# Patient Record
Sex: FEMALE | Race: WHITE | NOT HISPANIC OR LATINO | Employment: FULL TIME | URBAN - METROPOLITAN AREA
[De-identification: names, ages, dates, MRNs, and addresses within clinical notes are randomized per-mention and may not be internally consistent; named-entity substitution may affect disease eponyms.]

---

## 2017-01-20 ENCOUNTER — TELEPHONE (OUTPATIENT)
Dept: FAMILY MEDICINE | Facility: CLINIC | Age: 57
End: 2017-01-20

## 2017-01-20 NOTE — TELEPHONE ENCOUNTER
1/20/2017    Call Regarding Preventive Health Screening Cervical/PAP    Attempt 1    Message on voicemail     Comments:         Outreach   Bianca Wood

## 2017-02-16 DIAGNOSIS — M05.79 RHEUMATOID ARTHRITIS INVOLVING MULTIPLE SITES WITH POSITIVE RHEUMATOID FACTOR (H): ICD-10-CM

## 2017-02-16 DIAGNOSIS — Z79.899 ENCOUNTER FOR LONG-TERM (CURRENT) USE OF HIGH-RISK MEDICATION: ICD-10-CM

## 2017-02-16 DIAGNOSIS — M25.522 ELBOW PAIN, LEFT: ICD-10-CM

## 2017-02-16 NOTE — TELEPHONE ENCOUNTER
humira    Last Written Prescription Date:  12/19/16  Last Fill Quantity: 1.6 ml,   # refills: 5  Last Office Visit : 12/19/16  Future Office visit:  4/3/17

## 2017-02-27 DIAGNOSIS — E03.4 HYPOTHYROIDISM DUE TO ACQUIRED ATROPHY OF THYROID: ICD-10-CM

## 2017-02-27 RX ORDER — LEVOTHYROXINE SODIUM 112 UG/1
112 TABLET ORAL DAILY
Qty: 30 TABLET | Refills: 0 | Status: SHIPPED | OUTPATIENT
Start: 2017-02-27 | End: 2017-06-07

## 2017-02-27 NOTE — TELEPHONE ENCOUNTER
Medication is being filled for 1 time refill only due to:  Patient needs to be seen because it has been more than one year since last visit.   Carmen Piña RN  [facility]

## 2017-02-27 NOTE — TELEPHONE ENCOUNTER
levothyroxine (SYNTHROID, LEVOTHROID) 112 MCG tablet     Last Written Prescription Date: 11-30-16  Last Quantity: 90, # refills: 0  Last Office Visit with G, P or [facility] prescribing provider: 1-5-16        TSH   Date Value Ref Range Status   12/12/2016 1.31 0.40 - 4.00 mU/L Final

## 2017-03-21 DIAGNOSIS — Z51.81 ENCOUNTER FOR THERAPEUTIC DRUG MONITORING: ICD-10-CM

## 2017-03-21 LAB
ALT SERPL W P-5'-P-CCNC: 25 U/L (ref 0–50)
AST SERPL W P-5'-P-CCNC: 21 U/L (ref 0–45)
BASOPHILS # BLD AUTO: 0.1 10E9/L (ref 0–0.2)
BASOPHILS NFR BLD AUTO: 1 %
CREAT SERPL-MCNC: 0.71 MG/DL (ref 0.52–1.04)
CRP SERPL-MCNC: <2.9 MG/L (ref 0–8)
DIFFERENTIAL METHOD BLD: ABNORMAL
EOSINOPHIL # BLD AUTO: 0.2 10E9/L (ref 0–0.7)
EOSINOPHIL NFR BLD AUTO: 3.7 %
ERYTHROCYTE [DISTWIDTH] IN BLOOD BY AUTOMATED COUNT: 13.1 % (ref 10–15)
ERYTHROCYTE [SEDIMENTATION RATE] IN BLOOD BY WESTERGREN METHOD: 6 MM/H (ref 0–30)
GFR SERPL CREATININE-BSD FRML MDRD: 85 ML/MIN/1.7M2
HCT VFR BLD AUTO: 37.8 % (ref 35–47)
HGB BLD-MCNC: 13 G/DL (ref 11.7–15.7)
LYMPHOCYTES # BLD AUTO: 1.8 10E9/L (ref 0.8–5.3)
LYMPHOCYTES NFR BLD AUTO: 34.4 %
MCH RBC QN AUTO: 33.2 PG (ref 26.5–33)
MCHC RBC AUTO-ENTMCNC: 34.4 G/DL (ref 31.5–36.5)
MCV RBC AUTO: 96 FL (ref 78–100)
MONOCYTES # BLD AUTO: 0.6 10E9/L (ref 0–1.3)
MONOCYTES NFR BLD AUTO: 11.1 %
NEUTROPHILS # BLD AUTO: 2.6 10E9/L (ref 1.6–8.3)
NEUTROPHILS NFR BLD AUTO: 49.8 %
PLATELET # BLD AUTO: 233 10E9/L (ref 150–450)
RBC # BLD AUTO: 3.92 10E12/L (ref 3.8–5.2)
WBC # BLD AUTO: 5.1 10E9/L (ref 4–11)

## 2017-03-21 PROCEDURE — 82565 ASSAY OF CREATININE: CPT | Performed by: FAMILY MEDICINE

## 2017-03-21 PROCEDURE — 86140 C-REACTIVE PROTEIN: CPT | Performed by: FAMILY MEDICINE

## 2017-03-21 PROCEDURE — 36415 COLL VENOUS BLD VENIPUNCTURE: CPT | Performed by: FAMILY MEDICINE

## 2017-03-21 PROCEDURE — 85652 RBC SED RATE AUTOMATED: CPT | Performed by: FAMILY MEDICINE

## 2017-03-21 PROCEDURE — 85025 COMPLETE CBC W/AUTO DIFF WBC: CPT | Performed by: FAMILY MEDICINE

## 2017-03-21 PROCEDURE — 84450 TRANSFERASE (AST) (SGOT): CPT | Performed by: FAMILY MEDICINE

## 2017-03-21 PROCEDURE — 84460 ALANINE AMINO (ALT) (SGPT): CPT | Performed by: FAMILY MEDICINE

## 2017-03-22 ENCOUNTER — TELEPHONE (OUTPATIENT)
Dept: RHEUMATOLOGY | Facility: CLINIC | Age: 57
End: 2017-03-22

## 2017-03-22 NOTE — TELEPHONE ENCOUNTER
[facility] informing patient that a [employer] message has been sent regarding her recent lab results. Provided phone number for clinic should she have questions. Sent [employer] message with information from Dr. Guajardo.

## 2017-03-22 NOTE — TELEPHONE ENCOUNTER
----- Message from Ann Marie Guajardo MD sent at 3/22/2017  7:59 AM CDT -----  Regarding: labs  Can someone kindly let Ms. Mora know the following:  - her methotrexate monitoring labs are all normal, including blood counts, kidney function, and liver tests  - markers of inflammation are normal  - OK to continue current plan    Thanks  Cate

## 2017-04-03 ENCOUNTER — OFFICE VISIT (OUTPATIENT)
Dept: RHEUMATOLOGY | Facility: CLINIC | Age: 57
End: 2017-04-03
Attending: INTERNAL MEDICINE
Payer: COMMERCIAL

## 2017-04-03 VITALS
BODY MASS INDEX: 24.57 KG/M2 | DIASTOLIC BLOOD PRESSURE: 80 MMHG | SYSTOLIC BLOOD PRESSURE: 127 MMHG | HEART RATE: 67 BPM | OXYGEN SATURATION: 98 % | WEIGHT: 142 LBS

## 2017-04-03 DIAGNOSIS — Z79.899 ENCOUNTER FOR LONG-TERM (CURRENT) USE OF HIGH-RISK MEDICATION: ICD-10-CM

## 2017-04-03 DIAGNOSIS — D84.9 IMMUNOCOMPROMISED (H): Primary | ICD-10-CM

## 2017-04-03 DIAGNOSIS — M05.79 RHEUMATOID ARTHRITIS INVOLVING MULTIPLE SITES WITH POSITIVE RHEUMATOID FACTOR (H): ICD-10-CM

## 2017-04-03 DIAGNOSIS — M25.522 ELBOW PAIN, LEFT: ICD-10-CM

## 2017-04-03 PROCEDURE — G0009 ADMIN PNEUMOCOCCAL VACCINE: HCPCS | Mod: ZF

## 2017-04-03 PROCEDURE — 99212 OFFICE O/P EST SF 10 MIN: CPT | Mod: 25,ZF

## 2017-04-03 PROCEDURE — 90732 PPSV23 VACC 2 YRS+ SUBQ/IM: CPT | Mod: ZF | Performed by: INTERNAL MEDICINE

## 2017-04-03 PROCEDURE — 25000128 H RX IP 250 OP 636: Mod: ZF | Performed by: INTERNAL MEDICINE

## 2017-04-03 RX ORDER — FOLIC ACID 1 MG/1
TABLET ORAL
Qty: 45 TABLET | Refills: 6 | Status: SHIPPED | OUTPATIENT
Start: 2017-04-03 | End: 2017-11-11

## 2017-04-03 RX ADMIN — PNEUMOCOCCAL VACCINE POLYVALENT 0.5 ML
25; 25; 25; 25; 25; 25; 25; 25; 25; 25; 25; 25; 25; 25; 25; 25; 25; 25; 25; 25; 25; 25; 25 INJECTION, SOLUTION INTRAMUSCULAR; SUBCUTANEOUS at 12:29

## 2017-04-03 ASSESSMENT — PAIN SCALES - GENERAL: PAINLEVEL: NO PAIN (0)

## 2017-04-03 NOTE — NURSING NOTE
Patient who was identified by name and  was given Pneumovax 23. Patient tolerated injection well and was discharged without complication.   Manuela Bronson  CMA

## 2017-04-03 NOTE — PROGRESS NOTES
Rheumatology Clinic Visit-Fellow Note     Carolina Mora MRN# [id number]   YOB: 1960 Age: 55 year old     Date of Visit :Apr 3, 2017     Primary care provider: Michael Beltrán (General)          Assessment and Plan:   Assessment   # seropositive RA, non-erosive (RF 49/ACPA 205), dx 7/2015. Reportedly prior rheumatoid nodules (BL elbow, R hand, R foot - resolved), known L elbow flexion contracture.   # UC, in remission - dx >10y ago during her 2nd pregnancy, presented w/ hematochezia. Colonoscopy 2/2016 normal, repeat due 2/2018  # hx of hashimoto's thyroiditis  # fam hx of AI diseases - psoriasis, celiac, Crohn's   # prior smoker - 3-4 cigs per day, quit 2 wks ago  # hx of BL plantar fasciitis - resolved  # hx of L hand 3rd digit fracture    RA in remission x1 year. Discussed options of slowly de-escalating therapy to see how she does given she's been in remission for >6 months. We agreed to slowly decrease MTX and see how she tolerates this. Discussed given the aggressive nature of her disease at time of diagnosis (highly seropositive, elbow flexion contracture, rheumatoid nodules initially), I suspect that she will unlikely be able to be completely off all medications in the future. However, again, I do feel we can slowly titrate down on MTX. Would like to keep MTX on board for now (even if low dose) to theoretically allow Humira to work more efficaciously and longer. Pt in agreement of the above plan.     DEXA today shows normal bone density. Discussed ways to keep up bone health including increasing vitamin D intake through dietary means and increasing joint-bearing activities such as walking, etc.     Plan:  - continue Humira 40 mg SQ q2 weeks  - decrease MTX from 8 to 6 tabs weekly, continue FA daily (takes additional tab day before/day of/day after MTX)  - continue q3mo MTX monitoring labs  - pneumococcal vaccine today   - discussed restarting routine exercise regimen (walking) and trying to  incorporate vitamin D and omega-3 FA into her diet   - check Vit D at her next monitoring labs   - continue cigarette free! Excellent job    Orders Placed This Encounter   Procedures     ALT     AST     Creatinine     CBC with platelets differential     Vitamin D Deficiency     CRP inflammation     ESR     RTC in 3 mo    RHM  HBV: negative 7/2015  HCV: negative 7/2015  Quant gold: negative 12/2015  HIV: never tested per chart review  Cancer screenings: colonoscopy 2/2016 normal, mammo 6/2016 normal, due for PAP (last 7/2013)    Immunization History   Administered Date(s) Administered     Influenza (H1N1) 12/03/2009     Influenza (IIV3) 11/13/2008, 11/23/2009, 11/15/2010, 11/03/2011, 10/17/2012     Pneumococcal (PCV 13) 12/19/2016     Pneumococcal 23 valent 04/03/2017     TD (ADULT, 7+) 02/19/2002     TDAP Vaccine (Adacel) 06/14/2011   Flu UTD 2016    Pt seen and discussed with Dr. Fanta Guajardo MD  Rheumatology Fellow   Pager [phone]          Active Problem List:     Patient Active Problem List    Diagnosis Date Noted     Hypothyroidism due to acquired atrophy of thyroid 06/01/2016     Priority: Medium     Rheumatoid arthritis involving multiple sites with positive rheumatoid factor (H) 02/29/2016     Priority: Medium     Encounter for long-term (current) use of high-risk medication 02/29/2016     Priority: Medium     Hyperlipidemia with target LDL less than 130 07/31/2014     Diagnosis updated by automated process. Provider to review and confirm.       Ventral hernia without obstruction or gangrene 07/31/2014     History of nephrolithiasis 01/04/2013     Ulcerative colitis (H)      Smoker             History of Present Illness:   HPI from initial 7/2015 OV:  Carolina Mora is a 55 year old female with past medical history of colitis presented to clinic for evaluation of joint pains. Joint pains started last year in winter with carpal tunnel like symptoms. She saw her primary care in March  2015 and started using a splint which did help in the beginning but later in May her symptoms got worse. She also had swelling over her hands and feet and had difficulty making a tight fist. She had morning stiffness which lasts more than 1 hour. Starting having pain in her neck and reduced neck range of motion and had been taking ibuprofen  800 mg every 6 hours, which helped with some of the symptoms but did not make her 100% better . She had pain in her knees and lateral aspect of hips mostly at night.     She has history of colitis which was diagnosed in 2006 by colonoscopy. She was initially given a diagnosis of ulcerative colitis and started using suppository which helped with her symptoms. In 2014 she had a repeat colonoscopy after 8 years which showed no evidence of IBD on biopsy but showed ileitis and there was concern for Crohn's disease. She is not on any treatment and her bowel symptoms are controlled, she'll be getting a repeat colonoscopyhere was c in March 2016.    She has significant family history of autoimmune disease. Her nephew has Crohn's disease, father has celiac disease and 2 of her sisters have psoriasis. She quit smoking in May 2015 and has smoked 5 cigarettes per day for the past 20 years.    Interim hx 9/4/15 - Her blood tests revealed high RF 45 and ACPA of 205 conforming the diagnosis of rheumatoid arthritis, she was started on  Methotrexate starting from 7.5 mg and increased to 15 mg, 6 tab in 2 weeks . She has been on 15 mg methotrexate now for 4 weeks along with tapering dose of prednisone. Currently taking 5 mg prednisone. She felt 95 % better. Her joint pains, morning stiffness, joint swelling have improved. She has more energy, able to do gardening and use her hands without pain . Denies any side effects with Methotrexate use. She does c/o left elbow swelling and decreased ROM which becomes more pronounced towards the end of the week when she is due for her Methotrexate dose. She  "takes MTX on Saturday and following 2- 3 days her elbow is better with near normal ROM but then it becomes stiff. She also c/o pain in the plantar aspect of her feet mainly in the morning.     Interim hx - 12/11/15- She has some aches and pain in her hands. Her morning stiffness has improved and has decreased to 15 mins. Her feet are better, she uses new shoes for her plantar fascitis. She still has left elbow flexion contracture and can not straighten it out, even with higher dose of MTX it has not improved. She has some achy pain in it and her ROM is reduced. She has no side effects with higher dose of MTX. No oral ulcers, GI upset, hair loss or headaches.     Interim hx - 2/29/16 -  She was started on Humira on 1/2/16 because of persistent pain in few of her joints including hands and feet along with elbow flexion contracture. Over all her joint pains improved a lot and she has no morning stiffness, but the elbow contracture has not improved. She has mild pain in her elbow but is still able to use that arm. She is also on MTX 8 tab + folic acid. She had colonoscopy this month which showed no evidence of colitis. She has no GI symptoms    Interim hx - 5/27/16 - She is on Humira 40 mg SQ/ 2 weeks + MTX 8 tab + folic acid. She is doing well. Denies any joint pains or morning stiffness. She has had no recent flares. She started doing physical therapy for left elbow contracture and has noticed good results. ROM of her elbow has increased and also strength in that arm has improved.     Interim hx - 8/29/16: She's overall doing quite well on humira 40 mg q2 wks and methotrexate 8 tabs weekly. Denies any joint swelling/stiffness and denies any EMS. Still has 10 deg flexion contracture of L elbow but feels this continues to improve with PT. No recent colds/infections while on immunosuppressives and rarely has an injection site reaction. On \"double weekends\" where takes both humira and MTX on a Saturday, she notes the " "following Sunday she will feel fatigued with some stomach upset. Takes FA 1 mg daily. No active bowel concerns, not needing suppositories for her IBD. Last colonoscopy 2/2016 normal, repeat in 2 years.     Interim hx 12/19/2016:  Overall continues to do quite well. Has \"very occasional bouts\" of joint symptoms. For example one Sunday morning a month or so ago she helped catch at her daughter's softball practice. States she \"took several balls to the ankle\" and noted she woke up the next day with painful/stiff BL ankles which lasted several weeks. Denied swelling/warmth to ankles. Otherwise no joint issues, no EMS. Some GI upset on days she takes both Humira and MTX, but tried FA increased dosing around this time and sx's improved. Will have loose stools for a few days following her Humira.     Interim hx 4/3/2017:  Pt continues to do well. Denies any arthritis pain, swelling, or warmth and denies any EMS. Denies back or neck pain. No recent flares. Tolerating Humira and MTX well. No injection site reactions. Stopped smoking 2 wks ago. Stopped walking about a year ago for exercise, but is interested in restarting this. Denies f/s/c, cp, SOB, abd pain, n/t, rash. Interested in learning about options for de-escalating therapy if she continues to be in remission.            Review of Systems:   Review Of Systems  Constitutional: denies f/s/c  Skin: No skin rash.  Eyes: No vision change, dry eyes  Ears/Nose/Throat: no oral/nasal ulcers  Respiratory: No SOB, cough  Cardiovascular: no cp  Gastrointestinal: no change in BM  Neurologic: no numbness, tingling.   Endocrine: +hx Hashimotos             Past Medical History:     Past Medical History:   Diagnosis Date     Dysplasia of cervix 1990's    had cryotx     Hyperlipidemia LDL goal < 130 7/31/2014     Hypothyroidism      Menopause age 48     Nephrolithiasis 1/11    s/p ESWL     Rheumatoid arthritis (H) 6/15     Smoker      Ulcerative colitis (H) 1-06     Past Surgical " History:   Procedure Laterality Date     C REMOVAL GALLBLADDER  2-01    and repair ventral hernia     COLONOSCOPY  3-14-14     CRYOTHERAPY, CERVICAL  EARLY 1990's            Social History:     Social History     Occupational History      [employer]     Social History Main Topics     Smoking status: Former Smoker     Packs/day: 0.25     Types: Cigarettes     Quit date: 5/22/2015     Smokeless tobacco: Never Used      Comment: E Cig      Alcohol use Yes      Comment: occasional wine     Drug use: No     Sexual activity: Not Currently     Partners: Male   smoked for 30 years - quit 2 wks ago  EtOH use - none  Works in office setting          Family History:     Family History   Problem Relation Age of Onset     Breast Cancer Mother      Thyroid Disease Mother      DIABETES Maternal Grandmother      Hypertension Maternal Grandmother      CEREBROVASCULAR DISEASE Maternal Grandmother      CANCER Maternal Grandmother      Hypertension Paternal Grandmother      Breast Cancer Paternal Grandmother      CANCER Paternal Grandmother      Thyroid Disease Sister      Asthma Son             Allergies:     Allergies   Allergen Reactions     Codeine      Nausea and vomiting     Dye [Contrast Dye]             Medications:     Current Outpatient Prescriptions   Medication Sig Dispense Refill     methotrexate 2.5 MG tablet CHEMO Take 6 tablets (15 mg) by mouth once a week Take 6 tablets, 15 mg  of methotrexate once a week 24 tablet 3     folic acid (FOLVITE) 1 MG tablet Take 1 tab daily. Day prior to, day of, and day after MTX take 2 tabs. 45 tablet 6     adalimumab (HUMIRA PEN) 40 MG/0.8ML pen kit Inject 0.8 mLs (40 mg) Subcutaneous every 14 days 6 each 1     levothyroxine (SYNTHROID/LEVOTHROID) 112 MCG tablet Take 1 tablet (112 mcg) by mouth daily 2/27/17: Due for recheck/physical with MD. 30 tablet 0     [DISCONTINUED] methotrexate 2.5 MG tablet CHEMO Take 6 tablets (15 mg) by mouth once a week Take 6 tablets, 15 mg  of  methotrexate once a week 24 tablet 3     [DISCONTINUED] adalimumab (HUMIRA PEN) 40 MG/0.8ML pen kit Inject 0.8 mLs (40 mg) Subcutaneous every 14 days 6 each 1     [DISCONTINUED] folic acid (FOLVITE) 1 MG tablet Take 1 tab daily. Day prior to, day of, and day after MTX take 2 tabs. 45 tablet 6     [DISCONTINUED] methotrexate 2.5 MG tablet CHEMO Take 8 tablets, 20 mg  of methotrexate once a week 32 tablet 3     ibuprofen (ADVIL,MOTRIN) 600 MG tablet Take 600 mg by mouth as needed Reported on 4/3/2017              Physical Exam:   /80  Pulse 67  Wt 64.4 kg (142 lb)  SpO2 98%  BMI 24.57 kg/m2  142 lbs 0 oz     Constitutional: well-developed, appearing stated age; cooperative  Eyes: nl EOM, PERRLA, vision, conjunctiva, sclera  ENT: nl external ears, nose, hearing, lips, teeth, gums, throat  No mucous membrane lesions, normal saliva pool  Neck: no mass or thyroid enlargement  Resp: lungs clear to auscultation, nl to palpation  CV: RRR, no murmurs, rubs or gallops, no edema  GI: no ABD mass or tenderness, no HSM  Lymph: no cervical, supraclavicular, or epitrochlear nodes  MS: All TMJ, neck, shoulder, elbow, wrist, MCP/PIP/DIP, spine, hip, knee, ankle, and foot MTP/IP joints were examined.  - BL hands scattered very subtle swan neck deformities of DIPs  - mild joint laxity of BL wrists   - L elbow w/ 5-10 deg contraction flexure w/o active synovitis (unchanged)  - L hand 3rd PIP slight contraction flexure 2/2 prior fracture   Normal  strength. No dactylitis,  tenosynovitis, enthesopathy.  Skin: no nail pitting, alopecia, rash, lesions  Neuro: grossly nl cranial nerves, strength   Psych: nl judgement, orientation, memory, affect.         Data:     Results for orders placed or performed in visit on 03/21/17   CRP inflammation   Result Value Ref Range    CRP Inflammation <2.9 0.0 - 8.0 mg/L   ESR   Result Value Ref Range    Sed Rate 6 0 - 30 mm/h   CBC with platelets differential   Result Value Ref Range    WBC  5.1 4.0 - 11.0 10e9/L    RBC Count 3.92 3.8 - 5.2 10e12/L    Hemoglobin 13.0 11.7 - 15.7 g/dL    Hematocrit 37.8 35.0 - 47.0 %    MCV 96 78 - 100 fl    MCH 33.2 (H) 26.5 - 33.0 pg    MCHC 34.4 31.5 - 36.5 g/dL    RDW 13.1 10.0 - 15.0 %    Platelet Count 233 150 - 450 10e9/L    Diff Method Automated Method     % Neutrophils 49.8 %    % Lymphocytes 34.4 %    % Monocytes 11.1 %    % Eosinophils 3.7 %    % Basophils 1.0 %    Absolute Neutrophil 2.6 1.6 - 8.3 10e9/L    Absolute Lymphocytes 1.8 0.8 - 5.3 10e9/L    Absolute Monocytes 0.6 0.0 - 1.3 10e9/L    Absolute Eosinophils 0.2 0.0 - 0.7 10e9/L    Absolute Basophils 0.1 0.0 - 0.2 10e9/L   Creatinine   Result Value Ref Range    Creatinine 0.71 0.52 - 1.04 mg/dL    GFR Estimate 85 >60 mL/min/1.7m2    GFR Estimate If Black >90   GFR Calc   >60 mL/min/1.7m2   ALT   Result Value Ref Range    ALT 25 0 - 50 U/L   AST   Result Value Ref Range    AST 21 0 - 45 U/L     Cyclic Cit Pept IgG/IgA   Date Value Ref Range Status   07/06/2015 205 (H) <20 UNITS Final     Comment:     Strongly Positive     Hemoglobin   Date Value Ref Range Status   03/21/2017 13.0 11.7 - 15.7 g/dL Final   12/12/2016 13.9 11.7 - 15.7 g/dL Final   08/22/2016 14.0 11.7 - 15.7 g/dL Final     Urea Nitrogen   Date Value Ref Range Status   05/28/2015 18 7 - 30 mg/dL Final     Sed Rate   Date Value Ref Range Status   03/21/2017 6 0 - 30 mm/h Final   12/12/2016 6 0 - 30 mm/h Final   08/27/2015 8 0 - 30 mm/h Final     C-Reactive Protein   Date Value Ref Range Status   02/20/2015 <2.0 0 - 0.8 mg/dL Final     CRP Inflammation   Date Value Ref Range Status   03/21/2017 <2.9 0.0 - 8.0 mg/L Final   12/12/2016 <2.9 0.0 - 8.0 mg/L Final   05/19/2016 <2.9 0.0 - 8.0 mg/L Final     AST   Date Value Ref Range Status   03/21/2017 21 0 - 45 U/L Final   12/12/2016 19 0 - 45 U/L Final   08/22/2016 15 0 - 45 U/L Final     Albumin   Date Value Ref Range Status   08/22/2016 3.7 3.4 - 5.0 g/dL Final   05/19/2016 3.7  3.4 - 5.0 g/dL Final   05/28/2015 3.5 3.4 - 5.0 g/dL Final     Alkaline Phosphatase   Date Value Ref Range Status   05/28/2015 99 40 - 150 U/L Final     ALT   Date Value Ref Range Status   03/21/2017 25 0 - 50 U/L Final   12/12/2016 23 0 - 50 U/L Final   08/22/2016 20 0 - 50 U/L Final     Rheumatoid Factor   Date Value Ref Range Status   05/28/2015 49 (H) <20 IU/mL Final     Recent Labs   Lab Test  03/21/17   0753  12/12/16   0830  12/12/16   0829  08/22/16   0659   05/28/15   1631  07/31/14   0841   WBC  5.1   --   6.6  6.6   < >  7.2  5.5   HGB  13.0   --   13.9  14.0   < >  13.5  14.8   HCT  37.8   --   40.2  40.5   < >  39.8  43.0   MCV  96   --   95  95   < >  92  91   PLT  233   --   258  263   < >  282  215   BUN   --    --    --    --    --   18   --    TSH   --   1.31   --    --    --   4.09*  0.67   AST  21   --   19  15   < >  16   --    ALT  25   --   23  20   < >  20   --    ALKPHOS   --    --    --    --    --   99   --     < > = values in this interval not displayed.     BL hand xray 8/2016:  Impression:  1. No acute osseous abnormality.  2. No radiographic evidence of inflammatory arthritis.    Reviewed Rheumatology lab flow sheet        Attestation:  I was present with Dr. Guajardo during lozano aspects of history and physical examination, was directly involved in medical decision making and management of this patient, and I agree with the Dr. Guajardo s documentation, findings, and plan.     Now that Carolina's high-risk seropositive RA is well controlled, it's a good time to focus on health maintenance: management of cardiovascular risk, bone health, vaccinations, which we discussed in detail today.    Agree with trying a lower dose of MTX to decrease cumulative MTX exposure, but given erosive, CCP-positive disease, I don't think a drug holiday is feasible or beneficial.     I discussed the findings and recommendations with the patient.    Time spent: 40 minutes    Lana Jewell MD, MS  Rheumatology  Attending Physician  [facility] [phone]

## 2017-04-03 NOTE — MR AVS SNAPSHOT
After Visit Summary   4/3/2017    Carolina Mora    MRN: [id number]           Patient Information     Date Of Birth          1960        Visit Information        Provider Department      4/3/2017 10:30 AM Ann Marie Guajardo MD [facility]        Today's Diagnoses     Immunocompromised (H)    -  1    Rheumatoid arthritis involving multiple sites with positive rheumatoid factor (H)        Encounter for long-term (current) use of high-risk medication        Elbow pain, left          Care Instructions    DEXA bone density scan looked normal today     Labs from last week all normal    Decrease methotrexate from 8 tabs to 6 tabs weekly (prescription changed)  Continue folic acid daily     Continue Humira     Try to increase activity level - start walking again!  Can try to increase omega-3 fatty acids in diet - in fish like salmon, flaxseed or mery seeds, or as supplement     Return in 3 months  Standing labs are ordered - can have these done before next appt OR at your next appt (will also check vitamin D)    Pneumococcal vaccine today         Follow-ups after your visit        Follow-up notes from your care team     Return in about 3 months (around 7/3/2017).      Your next 10 appointments already scheduled     Jul 10, 2017  8:30 AM CDT   (Arrive by 8:15 AM)   Return Visit with Ann Marie Guajardo MD   [facility] ([facility])    [address] [place] [id number] [phone]              Future tests that were ordered for you today     Open Future Orders        Priority Expected Expires Ordered    Vitamin D Deficiency Routine 7/3/2017 4/3/2018 4/3/2017    CRP inflammation Routine 7/3/2017 4/3/2018 4/3/2017    ESR Routine 7/3/2017 4/3/2018 4/3/2017    ALT Routine 7/3/2017 4/3/2018 4/3/2017    AST Routine 7/3/2017 4/3/2018 4/3/2017    Creatinine Routine 7/3/2017 4/3/2018 4/3/2017    CBC with platelets differential Routine  7/3/2017 4/3/2018 4/3/2017            Who to contact     If you have questions or need follow up information about today's clinic visit or your schedule please contact [facility] directly at [phone].  Normal or non-critical lab and imaging results will be communicated to you by [employer], letter or phone within 4 business days after the clinic has received the results. If you do not hear from us within 7 days, please contact the clinic through [employer] or phone. If you have a critical or abnormal lab result, we will notify you by phone as soon as possible.  Submit refill requests through [employer] or call your pharmacy and they will forward the refill request to us. Please allow 3 business days for your refill to be completed.          Additional Information About Your Visit        [employer] Information     [employer] gives you secure access to your electronic health record. If you see a primary care provider, you can also send messages to your care team and make appointments. If you have questions, please call your primary care clinic.  If you do not have a primary care provider, please call [phone] and they will assist you.        Care EveryWhere ID     This is your Care EveryWhere ID. This could be used by other organizations to access your [facility] medical records  [id number]        Your Vitals Were     Pulse Pulse Oximetry BMI (Body Mass Index)             67 98% 24.57 kg/m2          Blood Pressure from Last 3 Encounters:   04/03/17 127/80   12/19/16 138/85   08/29/16 115/78    Weight from Last 3 Encounters:   04/03/17 64.4 kg (142 lb)   12/19/16 64.4 kg (142 lb)   08/29/16 61.7 kg (136 lb)                 Today's Medication Changes          These changes are accurate as of: 4/3/17 12:11 PM.  If you have any questions, ask your nurse or doctor.               Start taking these medicines.        Dose/Directions    methotrexate 2.5 MG tablet CHEMO   Used for:  Rheumatoid arthritis involving multiple  sites with positive rheumatoid factor (H), Encounter for long-term (current) use of high-risk medication, Elbow pain, left   Started by:  Ann Marie Guajardo MD        Dose:  15 mg   Take 6 tablets (15 mg) by mouth once a week Take 6 tablets, 15 mg  of methotrexate once a week   Quantity:  24 tablet   Refills:  3            Where to get your medicines      These medications were sent to [facility] [id number] - [place], [place] - [address]  [address] [address]     Phone:  [phone]     folic acid 1 MG tablet    methotrexate 2.5 MG tablet CHEMO         Call your pharmacy to confirm that your medication is ready for pickup. It may take up to 24 hours for them to receive the prescription. If the prescription is not ready within 3 business days, please contact your clinic or your provider.     We will let you know when these medications are ready. If you don't hear back within 3 business days, please contact us.     adalimumab 40 MG/0.8ML pen kit                Primary Care Provider Office Phone # Fax #    Michael Beltrán [phone]       [address] [address]        Thank you!     Thank you for choosing [facility]  for your care. Our goal is always to provide you with excellent care. Hearing back from our patients is one way we can continue to improve our services. Please take a few minutes to complete the written survey that you may receive in the mail after your visit with us. Thank you!             Your Updated Medication List - Protect others around you: Learn how to safely use, store and throw away your medicines at www.disposemymeds.org.          This list is accurate as of: 4/3/17 12:11 PM.  Always use your most recent med list.                   Brand Name Dispense Instructions for use    adalimumab 40 MG/0.8ML pen kit    HUMIRA PEN    6 each    Inject 0.8 mLs (40 mg) Subcutaneous every 14  days       folic acid 1 MG tablet    FOLVITE    45 tablet    Take 1 tab daily. Day prior to, day of, and day after MTX take 2 tabs.       ibuprofen 600 MG tablet    ADVIL/MOTRIN     Take 600 mg by mouth as needed Reported on 4/3/2017       levothyroxine 112 MCG tablet    SYNTHROID/LEVOTHROID    30 tablet    Take 1 tablet (112 mcg) by mouth daily 2/27/17: Due for recheck/physical with MD.       methotrexate 2.5 MG tablet CHEMO     24 tablet    Take 6 tablets (15 mg) by mouth once a week Take 6 tablets, 15 mg  of methotrexate once a week

## 2017-04-03 NOTE — NURSING NOTE
"Chief Complaint   Patient presents with     RECHECK     RA       Initial /80  Pulse 67  Wt 64.4 kg (142 lb)  SpO2 98%  BMI 24.57 kg/m2 Estimated body mass index is 24.57 kg/(m^2) as calculated from the following:    Height as of 12/19/16: 1.619 m (5' 3.75\").    Weight as of this encounter: 64.4 kg (142 lb).  Medication Reconciliation: complete    "

## 2017-04-03 NOTE — LETTER
4/3/2017      RE: Carolina Mora  [address]  [facility] [id number]       Rheumatology Clinic Visit-Fellow Note     Carolina Mora MRN# [id number]   YOB: 1960 Age: 55 year old     Date of Visit :Apr 3, 2017     Primary care provider: Michael Beltrán (General)          Assessment and Plan:   Assessment   # seropositive RA, non-erosive (RF 49/ACPA 205), dx 7/2015. Reportedly prior rheumatoid nodules (BL elbow, R hand, R foot - resolved), known L elbow flexion contracture.   # UC, in remission - dx >10y ago during her 2nd pregnancy, presented w/ hematochezia. Colonoscopy 2/2016 normal, repeat due 2/2018  # hx of hashimoto's thyroiditis  # fam hx of AI diseases - psoriasis, celiac, Crohn's   # prior smoker - 3-4 cigs per day, quit 2 wks ago  # hx of BL plantar fasciitis - resolved  # hx of L hand 3rd digit fracture    RA in remission x1 year. Discussed options of slowly de-escalating therapy to see how she does given she's been in remission for >6 months. We agreed to slowly decrease MTX and see how she tolerates this. Discussed given the aggressive nature of her disease at time of diagnosis (highly seropositive, elbow flexion contracture, rheumatoid nodules initially), I suspect that she will unlikely be able to be completely off all medications in the future. However, again, I do feel we can slowly titrate down on MTX. Would like to keep MTX on board for now (even if low dose) to theoretically allow Humira to work more efficaciously and longer. Pt in agreement of the above plan.     DEXA today shows normal bone density. Discussed ways to keep up bone health including increasing vitamin D intake through dietary means and increasing joint-bearing activities such as walking, etc.     Plan:  - continue Humira 40 mg SQ q2 weeks  - decrease MTX from 8 to 6 tabs weekly, continue FA daily (takes additional tab day before/day of/day after MTX)  - continue q3mo MTX monitoring labs  - pneumococcal  vaccine today   - discussed restarting routine exercise regimen (walking) and trying to incorporate vitamin D and omega-3 FA into her diet   - check Vit D at her next monitoring labs   - continue cigarette free! Excellent job    Orders Placed This Encounter   Procedures     ALT     AST     Creatinine     CBC with platelets differential     Vitamin D Deficiency     CRP inflammation     ESR     RTC in 3 mo    RHM  HBV: negative 7/2015  HCV: negative 7/2015  Quant gold: negative 12/2015  HIV: never tested per chart review  Cancer screenings: colonoscopy 2/2016 normal, mammo 6/2016 normal, due for PAP (last 7/2013)    Immunization History   Administered Date(s) Administered     Influenza (H1N1) 12/03/2009     Influenza (IIV3) 11/13/2008, 11/23/2009, 11/15/2010, 11/03/2011, 10/17/2012     Pneumococcal (PCV 13) 12/19/2016     Pneumococcal 23 valent 04/03/2017     TD (ADULT, 7+) 02/19/2002     TDAP Vaccine (Adacel) 06/14/2011   Flu UTD 2016    Pt seen and discussed with Dr. Fanta Guajardo MD  Rheumatology Fellow   Pager [phone]          Active Problem List:     Patient Active Problem List    Diagnosis Date Noted     Hypothyroidism due to acquired atrophy of thyroid 06/01/2016     Priority: Medium     Rheumatoid arthritis involving multiple sites with positive rheumatoid factor (H) 02/29/2016     Priority: Medium     Encounter for long-term (current) use of high-risk medication 02/29/2016     Priority: Medium     Hyperlipidemia with target LDL less than 130 07/31/2014     Diagnosis updated by automated process. Provider to review and confirm.       Ventral hernia without obstruction or gangrene 07/31/2014     History of nephrolithiasis 01/04/2013     Ulcerative colitis (H)      Smoker             History of Present Illness:   HPI from initial 7/2015 OV:  Carolina Mora is a 55 year old female with past medical history of colitis presented to clinic for evaluation of joint pains. Joint pains started  last year in winter with carpal tunnel like symptoms. She saw her primary care in March 2015 and started using a splint which did help in the beginning but later in May her symptoms got worse. She also had swelling over her hands and feet and had difficulty making a tight fist. She had morning stiffness which lasts more than 1 hour. Starting having pain in her neck and reduced neck range of motion and had been taking ibuprofen  800 mg every 6 hours, which helped with some of the symptoms but did not make her 100% better . She had pain in her knees and lateral aspect of hips mostly at night.     She has history of colitis which was diagnosed in 2006 by colonoscopy. She was initially given a diagnosis of ulcerative colitis and started using suppository which helped with her symptoms. In 2014 she had a repeat colonoscopy after 8 years which showed no evidence of IBD on biopsy but showed ileitis and there was concern for Crohn's disease. She is not on any treatment and her bowel symptoms are controlled, she'll be getting a repeat colonoscopyhere was c in March 2016.    She has significant family history of autoimmune disease. Her nephew has Crohn's disease, father has celiac disease and 2 of her sisters have psoriasis. She quit smoking in May 2015 and has smoked 5 cigarettes per day for the past 20 years.    Interim hx 9/4/15 - Her blood tests revealed high RF 45 and ACPA of 205 conforming the diagnosis of rheumatoid arthritis, she was started on  Methotrexate starting from 7.5 mg and increased to 15 mg, 6 tab in 2 weeks . She has been on 15 mg methotrexate now for 4 weeks along with tapering dose of prednisone. Currently taking 5 mg prednisone. She felt 95 % better. Her joint pains, morning stiffness, joint swelling have improved. She has more energy, able to do gardening and use her hands without pain . Denies any side effects with Methotrexate use. She does c/o left elbow swelling and decreased ROM which becomes more  pronounced towards the end of the week when she is due for her Methotrexate dose. She takes MTX on Saturday and following 2- 3 days her elbow is better with near normal ROM but then it becomes stiff. She also c/o pain in the plantar aspect of her feet mainly in the morning.     Interim hx - 12/11/15- She has some aches and pain in her hands. Her morning stiffness has improved and has decreased to 15 mins. Her feet are better, she uses new shoes for her plantar fascitis. She still has left elbow flexion contracture and can not straighten it out, even with higher dose of MTX it has not improved. She has some achy pain in it and her ROM is reduced. She has no side effects with higher dose of MTX. No oral ulcers, GI upset, hair loss or headaches.     Interim hx - 2/29/16 -  She was started on Humira on 1/2/16 because of persistent pain in few of her joints including hands and feet along with elbow flexion contracture. Over all her joint pains improved a lot and she has no morning stiffness, but the elbow contracture has not improved. She has mild pain in her elbow but is still able to use that arm. She is also on MTX 8 tab + folic acid. She had colonoscopy this month which showed no evidence of colitis. She has no GI symptoms    Interim hx - 5/27/16 - She is on Humira 40 mg SQ/ 2 weeks + MTX 8 tab + folic acid. She is doing well. Denies any joint pains or morning stiffness. She has had no recent flares. She started doing physical therapy for left elbow contracture and has noticed good results. ROM of her elbow has increased and also strength in that arm has improved.     Interim hx - 8/29/16: She's overall doing quite well on humira 40 mg q2 wks and methotrexate 8 tabs weekly. Denies any joint swelling/stiffness and denies any EMS. Still has 10 deg flexion contracture of L elbow but feels this continues to improve with PT. No recent colds/infections while on immunosuppressives and rarely has an injection site reaction.  "On \"double weekends\" where takes both humira and MTX on a Saturday, she notes the following Sunday she will feel fatigued with some stomach upset. Takes FA 1 mg daily. No active bowel concerns, not needing suppositories for her IBD. Last colonoscopy 2/2016 normal, repeat in 2 years.     Interim hx 12/19/2016:  Overall continues to do quite well. Has \"very occasional bouts\" of joint symptoms. For example one Sunday morning a month or so ago she helped catch at her daughter's softball practice. States she \"took several balls to the ankle\" and noted she woke up the next day with painful/stiff BL ankles which lasted several weeks. Denied swelling/warmth to ankles. Otherwise no joint issues, no EMS. Some GI upset on days she takes both Humira and MTX, but tried FA increased dosing around this time and sx's improved. Will have loose stools for a few days following her Humira.     Interim hx 4/3/2017:  Pt continues to do well. Denies any arthritis pain, swelling, or warmth and denies any EMS. Denies back or neck pain. No recent flares. Tolerating Humira and MTX well. No injection site reactions. Stopped smoking 2 wks ago. Stopped walking about a year ago for exercise, but is interested in restarting this. Denies f/s/c, cp, SOB, abd pain, n/t, rash. Interested in learning about options for de-escalating therapy if she continues to be in remission.            Review of Systems:   Review Of Systems  Constitutional: denies f/s/c  Skin: No skin rash.  Eyes: No vision change, dry eyes  Ears/Nose/Throat: no oral/nasal ulcers  Respiratory: No SOB, cough  Cardiovascular: no cp  Gastrointestinal: no change in BM  Neurologic: no numbness, tingling.   Endocrine: +hx Hashimotos             Past Medical History:     Past Medical History:   Diagnosis Date     Dysplasia of cervix 1990's    had cryotx     Hyperlipidemia LDL goal < 130 7/31/2014     Hypothyroidism      Menopause age 48     Nephrolithiasis 1/11    s/p ESWL     Rheumatoid " arthritis (H) 6/15     Smoker      Ulcerative colitis (H) 1-06     Past Surgical History:   Procedure Laterality Date     C REMOVAL GALLBLADDER  2-01    and repair ventral hernia     COLONOSCOPY  3-14-14     CRYOTHERAPY, CERVICAL  EARLY 1990's            Social History:     Social History     Occupational History      [employer]     Social History Main Topics     Smoking status: Former Smoker     Packs/day: 0.25     Types: Cigarettes     Quit date: 5/22/2015     Smokeless tobacco: Never Used      Comment: E Cig      Alcohol use Yes      Comment: occasional wine     Drug use: No     Sexual activity: Not Currently     Partners: Male   smoked for 30 years - quit 2 wks ago  EtOH use - none  Works in office setting          Family History:     Family History   Problem Relation Age of Onset     Breast Cancer Mother      Thyroid Disease Mother      DIABETES Maternal Grandmother      Hypertension Maternal Grandmother      CEREBROVASCULAR DISEASE Maternal Grandmother      CANCER Maternal Grandmother      Hypertension Paternal Grandmother      Breast Cancer Paternal Grandmother      CANCER Paternal Grandmother      Thyroid Disease Sister      Asthma Son             Allergies:     Allergies   Allergen Reactions     Codeine      Nausea and vomiting     Dye [Contrast Dye]             Medications:     Current Outpatient Prescriptions   Medication Sig Dispense Refill     methotrexate 2.5 MG tablet CHEMO Take 6 tablets (15 mg) by mouth once a week Take 6 tablets, 15 mg  of methotrexate once a week 24 tablet 3     folic acid (FOLVITE) 1 MG tablet Take 1 tab daily. Day prior to, day of, and day after MTX take 2 tabs. 45 tablet 6     adalimumab (HUMIRA PEN) 40 MG/0.8ML pen kit Inject 0.8 mLs (40 mg) Subcutaneous every 14 days 6 each 1     levothyroxine (SYNTHROID/LEVOTHROID) 112 MCG tablet Take 1 tablet (112 mcg) by mouth daily 2/27/17: Due for recheck/physical with MD. 30 tablet 0     [DISCONTINUED] methotrexate 2.5 MG tablet  CHEMO Take 6 tablets (15 mg) by mouth once a week Take 6 tablets, 15 mg  of methotrexate once a week 24 tablet 3     [DISCONTINUED] adalimumab (HUMIRA PEN) 40 MG/0.8ML pen kit Inject 0.8 mLs (40 mg) Subcutaneous every 14 days 6 each 1     [DISCONTINUED] folic acid (FOLVITE) 1 MG tablet Take 1 tab daily. Day prior to, day of, and day after MTX take 2 tabs. 45 tablet 6     [DISCONTINUED] methotrexate 2.5 MG tablet CHEMO Take 8 tablets, 20 mg  of methotrexate once a week 32 tablet 3     ibuprofen (ADVIL,MOTRIN) 600 MG tablet Take 600 mg by mouth as needed Reported on 4/3/2017              Physical Exam:   /80  Pulse 67  Wt 64.4 kg (142 lb)  SpO2 98%  BMI 24.57 kg/m2  142 lbs 0 oz     Constitutional: well-developed, appearing stated age; cooperative  Eyes: nl EOM, PERRLA, vision, conjunctiva, sclera  ENT: nl external ears, nose, hearing, lips, teeth, gums, throat  No mucous membrane lesions, normal saliva pool  Neck: no mass or thyroid enlargement  Resp: lungs clear to auscultation, nl to palpation  CV: RRR, no murmurs, rubs or gallops, no edema  GI: no ABD mass or tenderness, no HSM  Lymph: no cervical, supraclavicular, or epitrochlear nodes  MS: All TMJ, neck, shoulder, elbow, wrist, MCP/PIP/DIP, spine, hip, knee, ankle, and foot MTP/IP joints were examined.  - BL hands scattered very subtle swan neck deformities of DIPs  - mild joint laxity of BL wrists   - L elbow w/ 5-10 deg contraction flexure w/o active synovitis (unchanged)  - L hand 3rd PIP slight contraction flexure 2/2 prior fracture   Normal  strength. No dactylitis,  tenosynovitis, enthesopathy.  Skin: no nail pitting, alopecia, rash, lesions  Neuro: grossly nl cranial nerves, strength   Psych: nl judgement, orientation, memory, affect.         Data:     Results for orders placed or performed in visit on 03/21/17   CRP inflammation   Result Value Ref Range    CRP Inflammation <2.9 0.0 - 8.0 mg/L   ESR   Result Value Ref Range    Sed Rate 6 0 -  30 mm/h   CBC with platelets differential   Result Value Ref Range    WBC 5.1 4.0 - 11.0 10e9/L    RBC Count 3.92 3.8 - 5.2 10e12/L    Hemoglobin 13.0 11.7 - 15.7 g/dL    Hematocrit 37.8 35.0 - 47.0 %    MCV 96 78 - 100 fl    MCH 33.2 (H) 26.5 - 33.0 pg    MCHC 34.4 31.5 - 36.5 g/dL    RDW 13.1 10.0 - 15.0 %    Platelet Count 233 150 - 450 10e9/L    Diff Method Automated Method     % Neutrophils 49.8 %    % Lymphocytes 34.4 %    % Monocytes 11.1 %    % Eosinophils 3.7 %    % Basophils 1.0 %    Absolute Neutrophil 2.6 1.6 - 8.3 10e9/L    Absolute Lymphocytes 1.8 0.8 - 5.3 10e9/L    Absolute Monocytes 0.6 0.0 - 1.3 10e9/L    Absolute Eosinophils 0.2 0.0 - 0.7 10e9/L    Absolute Basophils 0.1 0.0 - 0.2 10e9/L   Creatinine   Result Value Ref Range    Creatinine 0.71 0.52 - 1.04 mg/dL    GFR Estimate 85 >60 mL/min/1.7m2    GFR Estimate If Black >90   GFR Calc   >60 mL/min/1.7m2   ALT   Result Value Ref Range    ALT 25 0 - 50 U/L   AST   Result Value Ref Range    AST 21 0 - 45 U/L     Cyclic Cit Pept IgG/IgA   Date Value Ref Range Status   07/06/2015 205 (H) <20 UNITS Final     Comment:     Strongly Positive     Hemoglobin   Date Value Ref Range Status   03/21/2017 13.0 11.7 - 15.7 g/dL Final   12/12/2016 13.9 11.7 - 15.7 g/dL Final   08/22/2016 14.0 11.7 - 15.7 g/dL Final     Urea Nitrogen   Date Value Ref Range Status   05/28/2015 18 7 - 30 mg/dL Final     Sed Rate   Date Value Ref Range Status   03/21/2017 6 0 - 30 mm/h Final   12/12/2016 6 0 - 30 mm/h Final   08/27/2015 8 0 - 30 mm/h Final     C-Reactive Protein   Date Value Ref Range Status   02/20/2015 <2.0 0 - 0.8 mg/dL Final     CRP Inflammation   Date Value Ref Range Status   03/21/2017 <2.9 0.0 - 8.0 mg/L Final   12/12/2016 <2.9 0.0 - 8.0 mg/L Final   05/19/2016 <2.9 0.0 - 8.0 mg/L Final     AST   Date Value Ref Range Status   03/21/2017 21 0 - 45 U/L Final   12/12/2016 19 0 - 45 U/L Final   08/22/2016 15 0 - 45 U/L Final     Albumin   Date Value  Ref Range Status   08/22/2016 3.7 3.4 - 5.0 g/dL Final   05/19/2016 3.7 3.4 - 5.0 g/dL Final   05/28/2015 3.5 3.4 - 5.0 g/dL Final     Alkaline Phosphatase   Date Value Ref Range Status   05/28/2015 99 40 - 150 U/L Final     ALT   Date Value Ref Range Status   03/21/2017 25 0 - 50 U/L Final   12/12/2016 23 0 - 50 U/L Final   08/22/2016 20 0 - 50 U/L Final     Rheumatoid Factor   Date Value Ref Range Status   05/28/2015 49 (H) <20 IU/mL Final     Recent Labs   Lab Test  03/21/17   0753  12/12/16   0830  12/12/16   0829  08/22/16   0659   05/28/15   1631  07/31/14   0841   WBC  5.1   --   6.6  6.6   < >  7.2  5.5   HGB  13.0   --   13.9  14.0   < >  13.5  14.8   HCT  37.8   --   40.2  40.5   < >  39.8  43.0   MCV  96   --   95  95   < >  92  91   PLT  233   --   258  263   < >  282  215   BUN   --    --    --    --    --   18   --    TSH   --   1.31   --    --    --   4.09*  0.67   AST  21   --   19  15   < >  16   --    ALT  25   --   23  20   < >  20   --    ALKPHOS   --    --    --    --    --   99   --     < > = values in this interval not displayed.     BL hand xray 8/2016:  Impression:  1. No acute osseous abnormality.  2. No radiographic evidence of inflammatory arthritis.    Reviewed Rheumatology lab flow sheet        Attestation:  I was present with Dr. Guajardo during lozano aspects of history and physical examination, was directly involved in medical decision making and management of this patient, and I agree with the Dr. Guajardo s documentation, findings, and plan.     Now that Carolina's high-risk seropositive RA is well controlled, it's a good time to focus on health maintenance: management of cardiovascular risk, bone health, vaccinations, which we discussed in detail today.    Agree with trying a lower dose of MTX to decrease cumulative MTX exposure, but given erosive, CCP-positive disease, I don't think a drug holiday is feasible or beneficial.     I discussed the findings and recommendations with the  patient.    Time spent: 40 minutes    Lana Jewell MD, MS  Rheumatology Attending Physician  [facility] [phone]

## 2017-04-03 NOTE — PATIENT INSTRUCTIONS
DEXA bone density scan looked normal today     Labs from last week all normal    Decrease methotrexate from 8 tabs to 6 tabs weekly (prescription changed)  Continue folic acid daily     Continue Humira     Try to increase activity level - start walking again!  Can try to increase omega-3 fatty acids in diet - in fish like salmon, flaxseed or mery seeds, or as supplement     Return in 3 months  Standing labs are ordered - can have these done before next appt OR at your next appt (will also check vitamin D)    Pneumococcal vaccine today

## 2017-04-04 ENCOUNTER — TELEPHONE (OUTPATIENT)
Dept: RHEUMATOLOGY | Facility: CLINIC | Age: 57
End: 2017-04-04

## 2017-04-04 NOTE — TELEPHONE ENCOUNTER
PA Initiation    Medication: HUMIRA- APPROVED  Insurance Company: [employer] - Phone [phone] Fax [phone]  Pharmacy Filling the Rx: [facility]  Filling Pharmacy Phone: [phone]  Filling Pharmacy Fax: [phone]  Start Date: 4/2/2017     Prior Authorization Approval    Authorization Effective Date: 3/4/2017  Authorization Expiration Date: 4/2/2020  Medication: HUMIRA- APPROVED  Approved Dose/Quantity: INJECT EVERY 24 DAYS  Reference #:     Insurance Company: [employer] Phone [phone] Fax [phone]  Expected CoPay:       CoPay Card Available:      Foundation Assistance Needed:    Which Pharmacy is filling the prescription (Not needed for infusion/clinic administered): [facility]  Pharmacy Notified:    Patient Notified:

## 2017-05-17 NOTE — TELEPHONE ENCOUNTER
5/17/2017    Call Regarding Preventive Health Screening Cervical/PAP    Attempt 2    Message on voicemail     Comments:           Outreach   LETA

## 2017-06-07 ENCOUNTER — MYC MEDICAL ADVICE (OUTPATIENT)
Dept: FAMILY MEDICINE | Facility: CLINIC | Age: 57
End: 2017-06-07

## 2017-06-07 DIAGNOSIS — E03.4 HYPOTHYROIDISM DUE TO ACQUIRED ATROPHY OF THYROID: ICD-10-CM

## 2017-06-07 RX ORDER — LEVOTHYROXINE SODIUM 112 UG/1
112 TABLET ORAL DAILY
Qty: 30 TABLET | Refills: 0 | Status: SHIPPED | OUTPATIENT
Start: 2017-06-07 | End: 2017-06-30

## 2017-06-07 NOTE — TELEPHONE ENCOUNTER
Please advise patient that her thyroid medicine was refilled, but she is overdue for a physical and Pap smear. Please assist her in scheduling that.

## 2017-06-07 NOTE — TELEPHONE ENCOUNTER
Synthroid     Last Written Prescription Date: 2/27/17  Last Quantity: 30, # refills: 0  Last Office Visit with [facility], [facility] or [facility] prescribing provider: over a year ago       TSH   Date Value Ref Range Status   12/12/2016 1.31 0.40 - 4.00 mU/L Final     Routing refill request to provider for review/approval because:  Patient needs to be seen because it has been more than 1 year since last office visit.  Patient advised at last refill needs to see PCP.    Karyn Goetz RN CPC Triage.

## 2017-06-29 NOTE — PROGRESS NOTES
SUBJECTIVE:   CC: Carolina Mora is an 56 year old male who presents for a preventative health visit and follow-up on some baseline health conditions.     Physical   Annual:     Getting at least 3 servings of Calcium per day::  Yes    Bi-annual eye exam::  Yes    Dental care twice a year::  Yes    Sleep apnea or symptoms of sleep apnea::  None    Diet::  Regular (no restrictions)    Frequency of exercise::  4-5 days/week    Duration of exercise::  30-45 minutes    Taking medications regularly::  Yes    Medication side effects::  None    Additional concerns today::  No    She is seeing rheumatology on a regular basis for her rheumatoid arthritis. She is on methotrexate and Humira for that. She also has a history of ulcerative colitis.  Her bowel movements have been better this last year than they have been in quite some time. She is fairly regular.   It's been many years since she's had any menstrual bleeding.        Today's PHQ-2 Score:   PHQ-2 ( 1999 [employer]) 6/29/2017   Q1: Little interest or pleasure in doing things 0   Q2: Feeling down, depressed or hopeless 0   PHQ-2 Score 0   Q1: Little interest or pleasure in doing things Not at all   Q2: Feeling down, depressed or hopeless Not at all   PHQ-2 Score 0       Abuse: Current or Past(Physical, Sexual or Emotional)- No  Do you feel safe in your environment - Yes    Social History   Substance Use Topics     Smoking status: Former Smoker     Packs/day: 0.25     Types: Cigarettes     Quit date: 5/22/2015     Smokeless tobacco: Never Used      Comment: E Cig      Alcohol use Yes      Comment: occasional wine     The patient does not drink >3 drinks per day nor >7 drinks per week.    Last PSA: No results found for: PSA    Reviewed orders with patient. Reviewed health maintenance and updated orders accordingly - Yes  Patient Active Problem List   Diagnosis     Ulcerative colitis (H)     History of nephrolithiasis     Hyperlipidemia with target LDL less than 130      Ventral hernia without obstruction or gangrene     Rheumatoid arthritis involving multiple sites with positive rheumatoid factor (H)     Encounter for long-term (current) use of high-risk medication     Hypothyroidism due to acquired atrophy of thyroid     Past Surgical History:   Procedure Laterality Date     COLONOSCOPY  3-14-14     CRYOTHERAPY, CERVICAL  EARLY 1990's     HC REMOVAL GALLBLADDER  2-01    and repair ventral hernia       Social History   Substance Use Topics     Smoking status: Former Smoker     Packs/day: 0.25     Types: Cigarettes     Quit date: 5/22/2015     Smokeless tobacco: Never Used      Comment: E Cig      Alcohol use Yes      Comment: occasional wine     Family History   Problem Relation Age of Onset     Breast Cancer Mother      Thyroid Disease Mother      DIABETES Maternal Grandmother      Hypertension Maternal Grandmother      CEREBROVASCULAR DISEASE Maternal Grandmother      CANCER Maternal Grandmother      Hypertension Paternal Grandmother      Breast Cancer Paternal Grandmother      CANCER Paternal Grandmother      Thyroid Disease Sister      Asthma Son          Current Outpatient Prescriptions   Medication Sig Dispense Refill     levothyroxine (SYNTHROID/LEVOTHROID) 112 MCG tablet Take 1 tablet (112 mcg) by mouth daily 90 tablet 3     methotrexate 2.5 MG tablet CHEMO Take 6 tablets (15 mg) by mouth once a week Take 6 tablets, 15 mg  of methotrexate once a week 24 tablet 3     folic acid (FOLVITE) 1 MG tablet Take 1 tab daily. Day prior to, day of, and day after MTX take 2 tabs. 45 tablet 6     adalimumab (HUMIRA PEN) 40 MG/0.8ML pen kit Inject 0.8 mLs (40 mg) Subcutaneous every 14 days 6 each 1     ibuprofen (ADVIL,MOTRIN) 600 MG tablet Take 600 mg by mouth as needed Reported on 4/3/2017       [DISCONTINUED] levothyroxine (SYNTHROID/LEVOTHROID) 112 MCG tablet Take 1 tablet (112 mcg) by mouth daily 2/27/17: Due for recheck/physical with MD. 30 tablet 0     Allergies   Allergen  "Reactions     Codeine      Nausea and vomiting     Dye [Contrast Dye]        Reviewed and updated as needed this visit by clinical staff  Tobacco  Allergies  Med Hx  Surg Hx  Fam Hx  Soc Hx        Reviewed and updated as needed this visit by Provider            ROS:  C: NEGATIVE for fever, chills, change in weight  I: NEGATIVE for worrisome rashes, moles or lesions  E: NEGATIVE for vision changes or irritation  ENT: NEGATIVE for ear, mouth and throat problems  R: NEGATIVE for significant cough or SOB  CV: NEGATIVE for chest pain, palpitations or peripheral edema  GI:  See above   male: negative for dysuria, hematuria, decreased urinary stream, erectile dysfunction, urethral discharge  MUSCULOSKELETAL:see above; her arthritis is fairly well controlled with her current meds  N: NEGATIVE for weakness, dizziness or paresthesias  P: NEGATIVE for changes in mood or affect    OBJECTIVE:   /71 (BP Location: Right arm, Patient Position: Chair, Cuff Size: Adult Regular)  Pulse 63  Temp 97.5  F (36.4  C) (Oral)  Ht 5' 3.39\" (1.61 m)  Wt 142 lb (64.4 kg)  SpO2 98%  BMI 24.85 kg/m2    EXAM:  GENERAL: healthy, alert and no distress  EYES: Eyes grossly normal to inspection, PERRL and conjunctivae and sclerae normal  HENT: ear canals and TM's normal, nose and mouth without ulcers or lesions  NECK: no adenopathy, no asymmetry, masses, or scars and thyroid normal to palpation  RESP: lungs clear to auscultation - no rales, rhonchi or wheezes  CV: regular rate and rhythm, normal S1 S2, no S3 or S4, no murmur, click or rub, no peripheral edema and peripheral pulses strong  ABDOMEN: soft, nontender, no hepatosplenomegaly, no masses; stable ventral hernia  :  No external vaginal lesions. Cervix appears normal. Pap smear was obtained.  MS: no gross musculoskeletal defects noted, no edema  SKIN: no suspicious lesions or rashes  NEURO: Normal strength and tone, mentation intact and speech normal  PSYCH: mentation appears " "normal, affect normal/bright    ASSESSMENT/PLAN:       ICD-10-CM    1. Routine general medical examination at a health care facility Z00.00    2. Hypothyroidism due to acquired atrophy of thyroid E03.4 levothyroxine (SYNTHROID/LEVOTHROID) 112 MCG tablet     TSH with free T4 reflex   3. Other ulcerative colitis with complication (H) K51.819    4. Rheumatoid arthritis involving multiple sites with positive rheumatoid factor (H) M05.79    5. Ventral hernia without obstruction or gangrene K43.9    6. Hyperlipidemia with target LDL less than 130 E78.5 Lipid panel reflex to direct LDL   7. Screening for malignant neoplasm of cervix Z12.4 Pap imaged thin layer screen with HPV - recommended age 30 - 65 years (select HPV order below)     HPV High Risk Types DNA Cervical      Blood pressure and other vital signs look good   We will check fasting lab work as above   She will also get her regular routine rheumatology labs done   she'll be getting a mammogram done later today  Continue ongoing rheumatology care for rheumatoid arthritis   Continue same baseline meds  Plan a recheck in one year, or sooner prn    COUNSELING:   Reviewed preventive health counseling, as reflected in patient instructions       Regular exercise       Healthy diet/nutrition       reports that she quit smoking about 2 years ago. Her smoking use included Cigarettes. She smoked 0.25 packs per day. She has never used smokeless tobacco.    Estimated body mass index is 24.85 kg/(m^2) as calculated from the following:    Height as of this encounter: 5' 3.39\" (1.61 m).    Weight as of this encounter: 142 lb (64.4 kg).       Counseling Resources:  ATP IV Guidelines  Pooled Cohorts Equation Calculator  FRAX Risk Assessment  ICSI Preventive Guidelines  Dietary Guidelines for Americans, 2010  USDA's MyPlate  ASA Prophylaxis  Lung CA Screening    Michael Beltrán MD  [facility]      Answers for HPI/ROS submitted by the patient on 6/29/2017 "   PHQ-2 Score: 0

## 2017-06-30 ENCOUNTER — RADIANT APPOINTMENT (OUTPATIENT)
Dept: MAMMOGRAPHY | Facility: CLINIC | Age: 57
End: 2017-06-30
Attending: INTERNAL MEDICINE
Payer: COMMERCIAL

## 2017-06-30 ENCOUNTER — OFFICE VISIT (OUTPATIENT)
Dept: FAMILY MEDICINE | Facility: CLINIC | Age: 57
End: 2017-06-30
Payer: COMMERCIAL

## 2017-06-30 VITALS
WEIGHT: 142 LBS | OXYGEN SATURATION: 98 % | HEART RATE: 63 BPM | TEMPERATURE: 97.5 F | SYSTOLIC BLOOD PRESSURE: 124 MMHG | HEIGHT: 63 IN | DIASTOLIC BLOOD PRESSURE: 71 MMHG | BODY MASS INDEX: 25.16 KG/M2

## 2017-06-30 DIAGNOSIS — Z00.00 ROUTINE GENERAL MEDICAL EXAMINATION AT A HEALTH CARE FACILITY: Primary | ICD-10-CM

## 2017-06-30 DIAGNOSIS — Z12.4 SCREENING FOR MALIGNANT NEOPLASM OF CERVIX: ICD-10-CM

## 2017-06-30 DIAGNOSIS — E03.4 HYPOTHYROIDISM DUE TO ACQUIRED ATROPHY OF THYROID: ICD-10-CM

## 2017-06-30 DIAGNOSIS — Z12.31 VISIT FOR SCREENING MAMMOGRAM: ICD-10-CM

## 2017-06-30 DIAGNOSIS — M05.79 RHEUMATOID ARTHRITIS INVOLVING MULTIPLE SITES WITH POSITIVE RHEUMATOID FACTOR (H): ICD-10-CM

## 2017-06-30 DIAGNOSIS — K43.9 VENTRAL HERNIA WITHOUT OBSTRUCTION OR GANGRENE: ICD-10-CM

## 2017-06-30 DIAGNOSIS — Z79.899 ENCOUNTER FOR LONG-TERM (CURRENT) USE OF HIGH-RISK MEDICATION: ICD-10-CM

## 2017-06-30 DIAGNOSIS — E78.5 HYPERLIPIDEMIA WITH TARGET LDL LESS THAN 130: ICD-10-CM

## 2017-06-30 DIAGNOSIS — K51.819 OTHER ULCERATIVE COLITIS WITH COMPLICATION (H): ICD-10-CM

## 2017-06-30 DIAGNOSIS — D84.9 IMMUNOCOMPROMISED (H): ICD-10-CM

## 2017-06-30 LAB
ALT SERPL W P-5'-P-CCNC: 24 U/L (ref 0–50)
AST SERPL W P-5'-P-CCNC: 17 U/L (ref 0–45)
BASOPHILS # BLD AUTO: 0.1 10E9/L (ref 0–0.2)
BASOPHILS NFR BLD AUTO: 1.1 %
CHOLEST SERPL-MCNC: 222 MG/DL
CREAT SERPL-MCNC: 0.74 MG/DL (ref 0.52–1.04)
CRP SERPL-MCNC: <2.9 MG/L (ref 0–8)
DEPRECATED CALCIDIOL+CALCIFEROL SERPL-MC: 32 UG/L (ref 20–75)
DIFFERENTIAL METHOD BLD: ABNORMAL
EOSINOPHIL # BLD AUTO: 0.2 10E9/L (ref 0–0.7)
EOSINOPHIL NFR BLD AUTO: 2.4 %
ERYTHROCYTE [DISTWIDTH] IN BLOOD BY AUTOMATED COUNT: 12.8 % (ref 10–15)
ERYTHROCYTE [SEDIMENTATION RATE] IN BLOOD BY WESTERGREN METHOD: 7 MM/H (ref 0–30)
GFR SERPL CREATININE-BSD FRML MDRD: 82 ML/MIN/1.7M2
HCT VFR BLD AUTO: 38.1 % (ref 35–47)
HDLC SERPL-MCNC: 55 MG/DL
HGB BLD-MCNC: 13.3 G/DL (ref 11.7–15.7)
LDLC SERPL CALC-MCNC: 148 MG/DL
LYMPHOCYTES # BLD AUTO: 1.6 10E9/L (ref 0.8–5.3)
LYMPHOCYTES NFR BLD AUTO: 25.4 %
MCH RBC QN AUTO: 33.3 PG (ref 26.5–33)
MCHC RBC AUTO-ENTMCNC: 34.9 G/DL (ref 31.5–36.5)
MCV RBC AUTO: 96 FL (ref 78–100)
MONOCYTES # BLD AUTO: 0.6 10E9/L (ref 0–1.3)
MONOCYTES NFR BLD AUTO: 10 %
NEUTROPHILS # BLD AUTO: 3.8 10E9/L (ref 1.6–8.3)
NEUTROPHILS NFR BLD AUTO: 61.1 %
NONHDLC SERPL-MCNC: 167 MG/DL
PLATELET # BLD AUTO: 241 10E9/L (ref 150–450)
RBC # BLD AUTO: 3.99 10E12/L (ref 3.8–5.2)
TRIGL SERPL-MCNC: 96 MG/DL
TSH SERPL DL<=0.005 MIU/L-ACNC: 0.86 MU/L (ref 0.4–4)
WBC # BLD AUTO: 6.2 10E9/L (ref 4–11)

## 2017-06-30 PROCEDURE — G0202 SCR MAMMO BI INCL CAD: HCPCS | Mod: TC

## 2017-06-30 PROCEDURE — 85652 RBC SED RATE AUTOMATED: CPT | Performed by: INTERNAL MEDICINE

## 2017-06-30 PROCEDURE — 77063 BREAST TOMOSYNTHESIS BI: CPT | Mod: TC

## 2017-06-30 PROCEDURE — 99213 OFFICE O/P EST LOW 20 MIN: CPT | Mod: 25 | Performed by: FAMILY MEDICINE

## 2017-06-30 PROCEDURE — 99396 PREV VISIT EST AGE 40-64: CPT | Performed by: FAMILY MEDICINE

## 2017-06-30 PROCEDURE — 36415 COLL VENOUS BLD VENIPUNCTURE: CPT | Performed by: FAMILY MEDICINE

## 2017-06-30 PROCEDURE — 82306 VITAMIN D 25 HYDROXY: CPT | Performed by: INTERNAL MEDICINE

## 2017-06-30 PROCEDURE — 84450 TRANSFERASE (AST) (SGOT): CPT | Performed by: INTERNAL MEDICINE

## 2017-06-30 PROCEDURE — 85025 COMPLETE CBC W/AUTO DIFF WBC: CPT | Performed by: INTERNAL MEDICINE

## 2017-06-30 PROCEDURE — 87624 HPV HI-RISK TYP POOLED RSLT: CPT | Performed by: FAMILY MEDICINE

## 2017-06-30 PROCEDURE — 82565 ASSAY OF CREATININE: CPT | Performed by: INTERNAL MEDICINE

## 2017-06-30 PROCEDURE — 80061 LIPID PANEL: CPT | Performed by: FAMILY MEDICINE

## 2017-06-30 PROCEDURE — 84443 ASSAY THYROID STIM HORMONE: CPT | Performed by: FAMILY MEDICINE

## 2017-06-30 PROCEDURE — 84460 ALANINE AMINO (ALT) (SGPT): CPT | Performed by: INTERNAL MEDICINE

## 2017-06-30 PROCEDURE — G0145 SCR C/V CYTO,THINLAYER,RESCR: HCPCS | Performed by: FAMILY MEDICINE

## 2017-06-30 PROCEDURE — 86140 C-REACTIVE PROTEIN: CPT | Performed by: INTERNAL MEDICINE

## 2017-06-30 RX ORDER — LEVOTHYROXINE SODIUM 112 UG/1
112 TABLET ORAL DAILY
Qty: 90 TABLET | Refills: 3 | Status: SHIPPED | OUTPATIENT
Start: 2017-06-30 | End: 2018-07-02

## 2017-06-30 NOTE — PROGRESS NOTES
Carolina,  Your thyroid test remains normal, so please continue your same dose of levothyroxine. Cholesterol values are still a bit high and slightly higher than last time. Continued efforts at healthy eating and regular exercise would be appropriate treatment.    Michael Beltrán MD

## 2017-06-30 NOTE — NURSING NOTE
"Chief Complaint   Patient presents with     Physical     Health Maintenance     Pap       Initial /71 (BP Location: Right arm, Patient Position: Chair, Cuff Size: Adult Regular)  Pulse 63  Temp 97.5  F (36.4  C) (Oral)  Ht 5' 3.39\" (1.61 m)  Wt 142 lb (64.4 kg)  SpO2 98%  BMI 24.85 kg/m2 Estimated body mass index is 24.85 kg/(m^2) as calculated from the following:    Height as of this encounter: 5' 3.39\" (1.61 m).    Weight as of this encounter: 142 lb (64.4 kg).  Medication Reconciliation: complete   Diana Cobb CMA       "

## 2017-06-30 NOTE — MR AVS SNAPSHOT
After Visit Summary   6/30/2017    Carolina Mora    MRN: [id number]           Patient Information     Date Of Birth          1960        Visit Information        Provider Department      6/30/2017 8:00 AM Michael Beltrán MD [facility]        Today's Diagnoses     Routine general medical examination at a health care facility    -  1    Hypothyroidism due to acquired atrophy of thyroid        Other ulcerative colitis with complication (H)        Rheumatoid arthritis involving multiple sites with positive rheumatoid factor (H)        Ventral hernia without obstruction or gangrene        Hyperlipidemia with target LDL less than 130        Screening for malignant neoplasm of cervix           Follow-ups after your visit        Your next 10 appointments already scheduled     Jun 30, 2017 10:00 AM CDT   MA SCREENING BILATERAL W/ PRASHANT with FKMA1   [facility]    [address] [place] [id number] [phone]           Do not use any powder, lotion or deodorant under your arms or on your breast. If you do, we will ask you to remove it before your exam.  Wear comfortable, two-piece clothing.  If you have any allergies, tell your care team.  Bring any previous mammograms from other facilities or have them mailed to the breast center.            Jul 10, 2017  8:30 AM CDT   (Arrive by 8:15 AM)   Return Visit with Ann Marie Guajardo MD   [facility] ([facility])    [address] [place] [id number] [phone]              Who to contact     If you have questions or need follow up information about today's clinic visit or your schedule please contact [facility] directly at [phone].  Normal or non-critical lab and imaging results will be communicated to you by MyChart, letter or phone within 4 business days after the clinic has received the  "results. If you do not hear from us within 7 days, please contact the clinic through [employer] or phone. If you have a critical or abnormal lab result, we will notify you by phone as soon as possible.  Submit refill requests through [employer] or call your pharmacy and they will forward the refill request to us. Please allow 3 business days for your refill to be completed.          Additional Information About Your Visit        [employer] Information     [employer] gives you secure access to your electronic health record. If you see a primary care provider, you can also send messages to your care team and make appointments. If you have questions, please call your primary care clinic.  If you do not have a primary care provider, please call [phone] and they will assist you.        Care EveryWhere ID     This is your Care EveryWhere ID. This could be used by other organizations to access your [place] medical records  [id number]        Your Vitals Were     Pulse Temperature Height Pulse Oximetry BMI (Body Mass Index)       63 97.5  F (36.4  C) (Oral) 5' 3.39\" (1.61 m) 98% 24.85 kg/m2        Blood Pressure from Last 3 Encounters:   06/30/17 124/71   04/03/17 127/80   12/19/16 138/85    Weight from Last 3 Encounters:   06/30/17 142 lb (64.4 kg)   04/03/17 142 lb (64.4 kg)   12/19/16 142 lb (64.4 kg)              We Performed the Following     HPV High Risk Types DNA Cervical     Lipid panel reflex to direct LDL     Pap imaged thin layer screen with HPV - recommended age 30 - 65 years (select HPV order below)     TSH with free T4 reflex          Today's Medication Changes          These changes are accurate as of: 6/30/17  8:40 AM.  If you have any questions, ask your nurse or doctor.               These medicines have changed or have updated prescriptions.        Dose/Directions    levothyroxine 112 MCG tablet   Commonly known as:  SYNTHROID/LEVOTHROID   This may have changed:  additional instructions   Used for:  " Hypothyroidism due to acquired atrophy of thyroid   Changed by:  Michael Beltrán MD        Dose:  112 mcg   Take 1 tablet (112 mcg) by mouth daily   Quantity:  90 tablet   Refills:  3            Where to get your medicines      These medications were sent to [facility] [id number] [facility] - [place], [place] - [address]  [address] [address]     Phone:  [phone]     levothyroxine 112 MCG tablet                Primary Care Provider Office Phone # Fax #    Michael Beltrán [phone]       [facility] [address]  [facility] [address]        Equal Access to Services     [facility]: Hadii aad ku hadasho Soomaali, waaxda luqadaha, qaybta kaalmada adeegyada, gregorio meek haynel mallory . So [facility] [phone].    ATENCIÓN: Si habla español, tiene a oneill disposición servicios gratuitos de asistencia lingüística. Llame al [phone].    We comply with applicable federal civil rights laws and [place] laws. We do not discriminate on the basis of race, color, national origin, age, disability sex, sexual orientation or gender identity.            Thank you!     Thank you for choosing [facility]  for your care. Our goal is always to provide you with excellent care. Hearing back from our patients is one way we can continue to improve our services. Please take a few minutes to complete the written survey that you may receive in the mail after your visit with us. Thank you!             Your Updated Medication List - Protect others around you: Learn how to safely use, store and throw away your medicines at www.disposemymeds.org.          This list is accurate as of: 6/30/17  8:40 AM.  Always use your most recent med list.                   Brand Name Dispense Instructions for use Diagnosis    adalimumab 40 MG/0.8ML pen kit    HUMIRA PEN    6 each    Inject 0.8 mLs (40 mg) Subcutaneous every 14 days    Encounter for  long-term (current) use of high-risk medication, Elbow pain, left, Rheumatoid arthritis involving multiple sites with positive rheumatoid factor (H)       folic acid 1 MG tablet    FOLVITE    45 tablet    Take 1 tab daily. Day prior to, day of, and day after MTX take 2 tabs.    Encounter for long-term (current) use of high-risk medication, Rheumatoid arthritis involving multiple sites with positive rheumatoid factor (H), Elbow pain, left       ibuprofen 600 MG tablet    ADVIL/MOTRIN     Take 600 mg by mouth as needed Reported on 4/3/2017        levothyroxine 112 MCG tablet    SYNTHROID/LEVOTHROID    90 tablet    Take 1 tablet (112 mcg) by mouth daily    Hypothyroidism due to acquired atrophy of thyroid       methotrexate 2.5 MG tablet CHEMO     24 tablet    Take 6 tablets (15 mg) by mouth once a week Take 6 tablets, 15 mg  of methotrexate once a week    Rheumatoid arthritis involving multiple sites with positive rheumatoid factor (H), Encounter for long-term (current) use of high-risk medication, Elbow pain, left

## 2017-07-05 LAB
COPATH REPORT: NORMAL
PAP: NORMAL

## 2017-07-07 LAB
FINAL DIAGNOSIS: NORMAL
HPV HR 12 DNA CVX QL NAA+PROBE: NEGATIVE
HPV16 DNA SPEC QL NAA+PROBE: NEGATIVE
HPV18 DNA SPEC QL NAA+PROBE: NEGATIVE
SPECIMEN DESCRIPTION: NORMAL

## 2017-07-10 ENCOUNTER — OFFICE VISIT (OUTPATIENT)
Dept: RHEUMATOLOGY | Facility: CLINIC | Age: 57
End: 2017-07-10
Attending: INTERNAL MEDICINE
Payer: COMMERCIAL

## 2017-07-10 VITALS
RESPIRATION RATE: 16 BRPM | DIASTOLIC BLOOD PRESSURE: 83 MMHG | SYSTOLIC BLOOD PRESSURE: 125 MMHG | HEART RATE: 61 BPM | WEIGHT: 142.9 LBS | HEIGHT: 63 IN | OXYGEN SATURATION: 98 % | TEMPERATURE: 98.1 F | BODY MASS INDEX: 25.32 KG/M2

## 2017-07-10 DIAGNOSIS — M05.79 RHEUMATOID ARTHRITIS INVOLVING MULTIPLE SITES WITH POSITIVE RHEUMATOID FACTOR (H): Primary | ICD-10-CM

## 2017-07-10 DIAGNOSIS — Z79.60 LONG-TERM USE OF IMMUNOSUPPRESSANT MEDICATION: ICD-10-CM

## 2017-07-10 PROCEDURE — 99212 OFFICE O/P EST SF 10 MIN: CPT | Mod: ZF

## 2017-07-10 ASSESSMENT — PAIN SCALES - GENERAL: PAINLEVEL: NO PAIN (0)

## 2017-07-10 NOTE — NURSING NOTE
"Chief Complaint   Patient presents with     RECHECK     RA       Initial /83 (BP Location: Right arm, Patient Position: Chair, Cuff Size: Adult Regular)  Pulse 61  Temp 98.1  F (36.7  C) (Oral)  Resp 16  Ht 1.61 m (5' 3.39\")  Wt 64.8 kg (142 lb 14.4 oz)  SpO2 98%  BMI 25.01 kg/m2 Estimated body mass index is 25.01 kg/(m^2) as calculated from the following:    Height as of this encounter: 1.61 m (5' 3.39\").    Weight as of this encounter: 64.8 kg (142 lb 14.4 oz).  Medication Reconciliation: complete     Bianca Sanchez [facility]  7/10/2017 8:32 AM          "

## 2017-07-10 NOTE — PROGRESS NOTES
Rheumatology Attending:    This patient was interviewed and examined in the presence of the medical fellow, and this note reflects our mutual impression.    Zachery Limon MD  Professor of Medicine  Director, Division of Rheumatic and Autoimmune Diseases

## 2017-07-10 NOTE — LETTER
7/10/2017      RE: Carolina Mora  [address]  [facility] [id number]       Rheumatology Attending:    This patient was interviewed and examined in the presence of the medical fellow, and this note reflects our mutual impression.    Zachery Limon MD  Professor of Medicine  Director, Division of Rheumatic and Autoimmune Diseases            Rheumatology Clinic Visit-Fellow Note     Carolina Mora MRN# [id number]   YOB: 1960 Age: 55 year old     Date of Visit :Jul 10, 2017     Primary care provider: Michael Beltrán (General)          Assessment and Plan:   Assessment   # seropositive RA, non-erosive (RF 49/ACPA 205), dx 7/2015. Reportedly prior rheumatoid nodules (BL elbow, R hand, R foot - resolved), known mild fixed L elbow flexion contracture.   # UC, in remission - dx >10y ago during her 2nd pregnancy, presented w/ hematochezia. Colonoscopy 2/2016 normal, repeat due 2/2018  # Hashimoto's thyroiditis  # fam hx of AI diseases - psoriasis, celiac, Crohn's   # prior smoker - 3-4 cigs per day, recently quit  # hx of BL plantar fasciitis - resolved  # hx of L hand 3rd digit fracture  # HLD    Doing well today, RA remains in remission for now >1 year. Tolerated mild therapy de-escalation in MTX from her last visit (8 -> 6 tabs weekly). Discussed that it is unlikely she will be able to completely wean off all her RA meds given she has seropositive disease that was initially quite aggressive. Discussed general prevention measures today, including increased risk of CV disease in pts with chronic inflammatory conditions, osteoporosis, etc.     Plan:  - continue Humira 40 mg SQ q2 weeks  - continue MTX 6 tabs weekly + FA 1 mg daily (takes additional tab day before/day of/day after MTX)  - continue q3mo MTX monitoring labs  - encouraged ongoing routine exercise (she enjoys walking), healthy diet. Does have elevated cholestrol/LDL, may need statin -> will leave up to primary    RTC in 6  mo    RHM  HBV: negative 7/2015  HCV: negative 7/2015  Quant gold: negative 12/2015  HIV: never tested per chart review - recommend checking at least 1x  Cancer screenings: colonoscopy 2/2016 normal, mammo 6/2016 normal  Immunizations: UTD on [id number], PPSV23, flu  Bone health: DEXA spring 2017 normal    Pt seen and discussed with Dr. Limon.     Ann Marie Guajardo MD  Rheumatology Fellow   Pager [phone]          Active Problem List:     Patient Active Problem List    Diagnosis Date Noted     Hypothyroidism due to acquired atrophy of thyroid 06/01/2016     Priority: Medium     Rheumatoid arthritis involving multiple sites with positive rheumatoid factor (H) 02/29/2016     Priority: Medium     Encounter for long-term (current) use of high-risk medication 02/29/2016     Priority: Medium     Hyperlipidemia with target LDL less than 130 07/31/2014     Diagnosis updated by automated process. Provider to review and confirm.       Ventral hernia without obstruction or gangrene 07/31/2014     History of nephrolithiasis 01/04/2013     Ulcerative colitis (H)             History of Present Illness:   HPI from initial 7/2015 OV:  Carolina Mora is a 55 year old female with past medical history of colitis presented to clinic for evaluation of joint pains. Joint pains started last year in winter with carpal tunnel like symptoms. She saw her primary care in March 2015 and started using a splint which did help in the beginning but later in May her symptoms got worse. She also had swelling over her hands and feet and had difficulty making a tight fist. She had morning stiffness which lasts more than 1 hour. Starting having pain in her neck and reduced neck range of motion and had been taking ibuprofen  800 mg every 6 hours, which helped with some of the symptoms but did not make her 100% better . She had pain in her knees and lateral aspect of hips mostly at night.     She has history of colitis which was diagnosed in 2006  by colonoscopy. She was initially given a diagnosis of ulcerative colitis and started using suppository which helped with her symptoms. In 2014 she had a repeat colonoscopy after 8 years which showed no evidence of IBD on biopsy but showed ileitis and there was concern for Crohn's disease. She is not on any treatment and her bowel symptoms are controlled, she'll be getting a repeat colonoscopyhere was c in March 2016.    She has significant family history of autoimmune disease. Her nephew has Crohn's disease, father has celiac disease and 2 of her sisters have psoriasis. She quit smoking in May 2015 and has smoked 5 cigarettes per day for the past 20 years.    Interim hx 9/4/15 - Her blood tests revealed high RF 45 and ACPA of 205 conforming the diagnosis of rheumatoid arthritis, she was started on  Methotrexate starting from 7.5 mg and increased to 15 mg, 6 tab in 2 weeks . She has been on 15 mg methotrexate now for 4 weeks along with tapering dose of prednisone. Currently taking 5 mg prednisone. She felt 95 % better. Her joint pains, morning stiffness, joint swelling have improved. She has more energy, able to do gardening and use her hands without pain . Denies any side effects with Methotrexate use. She does c/o left elbow swelling and decreased ROM which becomes more pronounced towards the end of the week when she is due for her Methotrexate dose. She takes MTX on Saturday and following 2- 3 days her elbow is better with near normal ROM but then it becomes stiff. She also c/o pain in the plantar aspect of her feet mainly in the morning.     Interim hx - 12/11/15- She has some aches and pain in her hands. Her morning stiffness has improved and has decreased to 15 mins. Her feet are better, she uses new shoes for her plantar fascitis. She still has left elbow flexion contracture and can not straighten it out, even with higher dose of MTX it has not improved. She has some achy pain in it and her ROM is reduced.  "She has no side effects with higher dose of MTX. No oral ulcers, GI upset, hair loss or headaches.     Interim hx - 2/29/16 -  She was started on Humira on 1/2/16 because of persistent pain in few of her joints including hands and feet along with elbow flexion contracture. Over all her joint pains improved a lot and she has no morning stiffness, but the elbow contracture has not improved. She has mild pain in her elbow but is still able to use that arm. She is also on MTX 8 tab + folic acid. She had colonoscopy this month which showed no evidence of colitis. She has no GI symptoms    Interim hx - 5/27/16 - She is on Humira 40 mg SQ/ 2 weeks + MTX 8 tab + folic acid. She is doing well. Denies any joint pains or morning stiffness. She has had no recent flares. She started doing physical therapy for left elbow contracture and has noticed good results. ROM of her elbow has increased and also strength in that arm has improved.     Interim hx - 8/29/16: She's overall doing quite well on humira 40 mg q2 wks and methotrexate 8 tabs weekly. Denies any joint swelling/stiffness and denies any EMS. Still has 10 deg flexion contracture of L elbow but feels this continues to improve with PT. No recent colds/infections while on immunosuppressives and rarely has an injection site reaction. On \"double weekends\" where takes both humira and MTX on a Saturday, she notes the following Sunday she will feel fatigued with some stomach upset. Takes FA 1 mg daily. No active bowel concerns, not needing suppositories for her IBD. Last colonoscopy 2/2016 normal, repeat in 2 years.     Interim hx 12/19/2016:  Overall continues to do quite well. Has \"very occasional bouts\" of joint symptoms. For example one Sunday morning a month or so ago she helped catch at her daughter's softball practice. States she \"took several balls to the ankle\" and noted she woke up the next day with painful/stiff BL ankles which lasted several weeks. Denied " swelling/warmth to ankles. Otherwise no joint issues, no EMS. Some GI upset on days she takes both Humira and MTX, but tried FA increased dosing around this time and sx's improved. Will have loose stools for a few days following her Humira.     Interim hx 4/3/2017:  Pt continues to do well. Denies any arthritis pain, swelling, or warmth and denies any EMS. Denies back or neck pain. No recent flares. Tolerating Humira and MTX well. No injection site reactions. Stopped smoking 2 wks ago. Stopped walking about a year ago for exercise, but is interested in restarting this. Denies f/s/c, cp, SOB, abd pain, n/t, rash. Interested in learning about options for de-escalating therapy if she continues to be in remission.     Interim hx 7/10/2017:  Remains in remission. Very rarely has occasional pain in foot/hands. Painted her daughter's room yesterday and had some mild pain/swelling this morning, but otherwise doing well. Denies morning stiffness. She's been under increased stress recently and feels this impacts her athritis symptoms. 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. Has been walking more/staying active - walks rescue dog Blue. Plays softball with her daughter. She's unhappy with recent increase in total cholesterol levels at her PCP the other day. No other new health concerns. Tolerating MTX 6 tabs well w/o issue, no worsening since decreased dose from 8 tabs weekly following last visit.           Review of Systems:   Review Of Systems  Constitutional: denies f/s/c  Skin: No skin rash.  Eyes: No vision change, dry eyes  Ears/Nose/Throat: no oral/nasal ulcers  Respiratory: No SOB, cough  Cardiovascular: no cp  Gastrointestinal: no change in BM  Neurologic: no numbness, tingling.   Endocrine: +hx Hashimotos             Past Medical History:     Past Medical History:   Diagnosis Date     Dysplasia of cervix 1990's    had cryotx     Hyperlipidemia LDL goal < 130 7/31/2014     Hypothyroidism      Menopause age 48     Nephrolithiasis  1/11    s/p ESWL     Rheumatoid arthritis (H) 6/15     Smoker      Ulcerative colitis (H) 1-06     Past Surgical History:   Procedure Laterality Date     COLONOSCOPY  3-14-14     CRYOTHERAPY, CERVICAL  EARLY 1990's     HC REMOVAL GALLBLADDER  2-01    and repair ventral hernia            Social History:     Social History     Occupational History      [employer]     Social History Main Topics     Smoking status: Former Smoker     Packs/day: 0.25     Types: Cigarettes     Quit date: 5/22/2015     Smokeless tobacco: Never Used      Comment: E Cig      Alcohol use Yes      Comment: occasional wine (twice per year)     Drug use: No     Sexual activity: Not Currently     Partners: Male   smoked for 30 years - quit spring 2017  EtOH use - none  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          Family History:     Family History   Problem Relation Age of Onset     Breast Cancer Mother      Thyroid Disease Mother      DIABETES Maternal Grandmother      Hypertension Maternal Grandmother      CEREBROVASCULAR DISEASE Maternal Grandmother      CANCER Maternal Grandmother      Hypertension Paternal Grandmother      Breast Cancer Paternal Grandmother      CANCER Paternal Grandmother      Thyroid Disease Sister      Asthma Son             Allergies:     Allergies   Allergen Reactions     Codeine      Nausea and vomiting     Dye [Contrast Dye]             Medications:     Current Outpatient Prescriptions   Medication Sig Dispense Refill     levothyroxine (SYNTHROID/LEVOTHROID) 112 MCG tablet Take 1 tablet (112 mcg) by mouth daily 90 tablet 3     methotrexate 2.5 MG tablet CHEMO Take 6 tablets (15 mg) by mouth once a week Take 6 tablets, 15 mg  of methotrexate once a week 24 tablet 3     folic acid (FOLVITE) 1 MG tablet Take 1 tab daily. Day prior to, day of, and day after MTX take 2 tabs. 45 tablet 6     adalimumab (HUMIRA PEN) 40 MG/0.8ML pen kit Inject 0.8 mLs (40 mg) Subcutaneous every 14 days 6 each 1  "    ibuprofen (ADVIL,MOTRIN) 600 MG tablet Take 600 mg by mouth as needed Reported on 4/3/2017              Physical Exam:   /83 (BP Location: Right arm, Patient Position: Chair, Cuff Size: Adult Regular)  Pulse 61  Temp 98.1  F (36.7  C) (Oral)  Resp 16  Ht 1.61 m (5' 3.39\")  Wt 64.8 kg (142 lb 14.4 oz)  SpO2 98%  BMI 25.01 kg/m2  142 lbs 14.4 oz     Constitutional: well-developed, appearing stated age; cooperative  Eyes: nl EOM, PERRLA, vision, conjunctiva, sclera  ENT: nl external ears, nose, hearing, lips, teeth, gums, throat  No mucous membrane lesions, normal saliva pool  Neck: no mass or thyroid enlargement  Resp: lungs clear to auscultation, nl to palpation  CV: RRR, no murmurs, rubs or gallops, no edema  GI: no ABD mass or tenderness, no HSM  Lymph: no cervical, supraclavicular, or epitrochlear nodes  MS: All TMJ, neck, shoulder, elbow, wrist, MCP/PIP/DIP, spine, hip, knee, ankle, and foot MTP/IP joints were examined.  - BL hands scattered very subtle swan neck deformities of DIPs  - mild joint laxity of BL wrists   - L elbow w/ 5 deg contraction flexure w/o active synovitis (unchanged)  - L hand 3rd PIP slight contraction flexure 2/2 prior fracture   - subtle diffuse swelling of hands BL without actual synovitis  Normal  strength. No dactylitis,  tenosynovitis, enthesopathy.  Skin: no nail pitting, alopecia, rash, lesions  Neuro: grossly nl cranial nerves, strength   Psych: nl judgement, orientation, memory, affect.         Data:     Results for orders placed or performed in visit on 06/30/17   MA Screen Bilateral w/Ramiro    Narrative    SCREENING MAMMOGRAM, BILATERAL, DIGITAL w/CAD AND TOMOSYNTHESIS -  6/30/2017 9:29 AM    BREAST SYMPTOMS: No current breast complaints.     COMPARISON:  06/16/2016, 06/11/2015, 06/10/2014, 06/05/2013.    BREAST DENSITY: Scattered fibroglandular densities.    COMMENTS: No findings of suspicion for malignancy.       Impression    IMPRESSION: BI-RADS CATEGORY: " 1 -  Negative    RECOMMENDED FOLLOW-UP: Annual Mammography.    Exam results letter mailed to patient.      STEVE JJ MD     Cyclic Cit Pept IgG/IgA   Date Value Ref Range Status   07/06/2015 205 (H) <20 UNITS Final     Comment:     Strongly Positive     Hemoglobin   Date Value Ref Range Status   06/30/2017 13.3 11.7 - 15.7 g/dL Final   03/21/2017 13.0 11.7 - 15.7 g/dL Final   12/12/2016 13.9 11.7 - 15.7 g/dL Final     Urea Nitrogen   Date Value Ref Range Status   05/28/2015 18 7 - 30 mg/dL Final     Sed Rate   Date Value Ref Range Status   06/30/2017 7 0 - 30 mm/h Final   03/21/2017 6 0 - 30 mm/h Final   12/12/2016 6 0 - 30 mm/h Final     C-Reactive Protein   Date Value Ref Range Status   02/20/2015 <2.0 0 - 0.8 mg/dL Final     CRP Inflammation   Date Value Ref Range Status   06/30/2017 <2.9 0.0 - 8.0 mg/L Final   03/21/2017 <2.9 0.0 - 8.0 mg/L Final   12/12/2016 <2.9 0.0 - 8.0 mg/L Final     AST   Date Value Ref Range Status   06/30/2017 17 0 - 45 U/L Final   03/21/2017 21 0 - 45 U/L Final   12/12/2016 19 0 - 45 U/L Final     Albumin   Date Value Ref Range Status   08/22/2016 3.7 3.4 - 5.0 g/dL Final   05/19/2016 3.7 3.4 - 5.0 g/dL Final   05/28/2015 3.5 3.4 - 5.0 g/dL Final     Alkaline Phosphatase   Date Value Ref Range Status   05/28/2015 99 40 - 150 U/L Final     ALT   Date Value Ref Range Status   06/30/2017 24 0 - 50 U/L Final   03/21/2017 25 0 - 50 U/L Final   12/12/2016 23 0 - 50 U/L Final     Rheumatoid Factor   Date Value Ref Range Status   05/28/2015 49 (H) <20 IU/mL Final     Recent Labs   Lab Test  06/30/17   0845  03/21/17   0753  12/12/16   0830  12/12/16   0829   05/28/15   1631   WBC  6.2  5.1   --   6.6   < >  7.2   HGB  13.3  13.0   --   13.9   < >  13.5   HCT  38.1  37.8   --   40.2   < >  39.8   MCV  96  96   --   95   < >  92   PLT  241  233   --   258   < >  282   BUN   --    --    --    --    --   18   TSH  0.86   --   1.31   --    --   4.09*   AST  17  21   --   19   < >  16   ALT  24   25   --   23   < >  20   ALKPHOS   --    --    --    --    --   99    < > = values in this interval not displayed.     BL hand xray 8/2016:  Impression:  1. No acute osseous abnormality.  2. No radiographic evidence of inflammatory arthritis.    Reviewed Rheumatology lab flow sheet

## 2017-07-10 NOTE — PATIENT INSTRUCTIONS
Joints look good today    Continue current dose methotrexate and Humira    Call for refills     Return in 6 months, sooner if needed

## 2017-07-11 NOTE — PROGRESS NOTES
Rheumatology Clinic Visit-Fellow Note     Carolina Mora MRN# [id number]   YOB: 1960 Age: 55 year old     Date of Visit :Jul 10, 2017     Primary care provider: Michael Beltrán (General)          Assessment and Plan:   Assessment   # seropositive RA, non-erosive (RF 49/ACPA 205), dx 7/2015. Reportedly prior rheumatoid nodules (BL elbow, R hand, R foot - resolved), known mild fixed L elbow flexion contracture.   # UC, in remission - dx >10y ago during her 2nd pregnancy, presented w/ hematochezia. Colonoscopy 2/2016 normal, repeat due 2/2018  # Hashimoto's thyroiditis  # fam hx of AI diseases - psoriasis, celiac, Crohn's   # prior smoker - 3-4 cigs per day, recently quit  # hx of BL plantar fasciitis - resolved  # hx of L hand 3rd digit fracture  # HLD    Doing well today, RA remains in remission for now >1 year. Tolerated mild therapy de-escalation in MTX from her last visit (8 -> 6 tabs weekly). Discussed that it is unlikely she will be able to completely wean off all her RA meds given she has seropositive disease that was initially quite aggressive. Discussed general prevention measures today, including increased risk of CV disease in pts with chronic inflammatory conditions, osteoporosis, etc.     Plan:  - continue Humira 40 mg SQ q2 weeks  - continue MTX 6 tabs weekly + FA 1 mg daily (takes additional tab day before/day of/day after MTX)  - continue q3mo MTX monitoring labs  - encouraged ongoing routine exercise (she enjoys walking), healthy diet. Does have elevated cholestrol/LDL, may need statin -> will leave up to primary    RTC in 6 mo    RHM  HBV: negative 7/2015  HCV: negative 7/2015  Quant gold: negative 12/2015  HIV: never tested per chart review - recommend checking at least 1x  Cancer screenings: colonoscopy 2/2016 normal, mammo 6/2016 normal  Immunizations: UTD on [id number], PPSV23, flu  Bone health: DEXA spring 2017 normal    Pt seen and discussed with Dr. Limon.     Ann Marie Courtney  Bennett BARRAZA  Rheumatology Fellow   Pager [phone]          Active Problem List:     Patient Active Problem List    Diagnosis Date Noted     Hypothyroidism due to acquired atrophy of thyroid 06/01/2016     Priority: Medium     Rheumatoid arthritis involving multiple sites with positive rheumatoid factor (H) 02/29/2016     Priority: Medium     Encounter for long-term (current) use of high-risk medication 02/29/2016     Priority: Medium     Hyperlipidemia with target LDL less than 130 07/31/2014     Diagnosis updated by automated process. Provider to review and confirm.       Ventral hernia without obstruction or gangrene 07/31/2014     History of nephrolithiasis 01/04/2013     Ulcerative colitis (H)             History of Present Illness:   HPI from initial 7/2015 OV:  Carolina Mora is a 55 year old female with past medical history of colitis presented to clinic for evaluation of joint pains. Joint pains started last year in winter with carpal tunnel like symptoms. She saw her primary care in March 2015 and started using a splint which did help in the beginning but later in May her symptoms got worse. She also had swelling over her hands and feet and had difficulty making a tight fist. She had morning stiffness which lasts more than 1 hour. Starting having pain in her neck and reduced neck range of motion and had been taking ibuprofen  800 mg every 6 hours, which helped with some of the symptoms but did not make her 100% better . She had pain in her knees and lateral aspect of hips mostly at night.     She has history of colitis which was diagnosed in 2006 by colonoscopy. She was initially given a diagnosis of ulcerative colitis and started using suppository which helped with her symptoms. In 2014 she had a repeat colonoscopy after 8 years which showed no evidence of IBD on biopsy but showed ileitis and there was concern for Crohn's disease. She is not on any treatment and her bowel symptoms are controlled,  she'll be getting a repeat colonoscopyhere was c in March 2016.    She has significant family history of autoimmune disease. Her nephew has Crohn's disease, father has celiac disease and 2 of her sisters have psoriasis. She quit smoking in May 2015 and has smoked 5 cigarettes per day for the past 20 years.    Interim hx 9/4/15 - Her blood tests revealed high RF 45 and ACPA of 205 conforming the diagnosis of rheumatoid arthritis, she was started on  Methotrexate starting from 7.5 mg and increased to 15 mg, 6 tab in 2 weeks . She has been on 15 mg methotrexate now for 4 weeks along with tapering dose of prednisone. Currently taking 5 mg prednisone. She felt 95 % better. Her joint pains, morning stiffness, joint swelling have improved. She has more energy, able to do gardening and use her hands without pain . Denies any side effects with Methotrexate use. She does c/o left elbow swelling and decreased ROM which becomes more pronounced towards the end of the week when she is due for her Methotrexate dose. She takes MTX on Saturday and following 2- 3 days her elbow is better with near normal ROM but then it becomes stiff. She also c/o pain in the plantar aspect of her feet mainly in the morning.     Interim hx - 12/11/15- She has some aches and pain in her hands. Her morning stiffness has improved and has decreased to 15 mins. Her feet are better, she uses new shoes for her plantar fascitis. She still has left elbow flexion contracture and can not straighten it out, even with higher dose of MTX it has not improved. She has some achy pain in it and her ROM is reduced. She has no side effects with higher dose of MTX. No oral ulcers, GI upset, hair loss or headaches.     Interim hx - 2/29/16 -  She was started on Humira on 1/2/16 because of persistent pain in few of her joints including hands and feet along with elbow flexion contracture. Over all her joint pains improved a lot and she has no morning stiffness, but the  "elbow contracture has not improved. She has mild pain in her elbow but is still able to use that arm. She is also on MTX 8 tab + folic acid. She had colonoscopy this month which showed no evidence of colitis. She has no GI symptoms    Interim hx - 5/27/16 - She is on Humira 40 mg SQ/ 2 weeks + MTX 8 tab + folic acid. She is doing well. Denies any joint pains or morning stiffness. She has had no recent flares. She started doing physical therapy for left elbow contracture and has noticed good results. ROM of her elbow has increased and also strength in that arm has improved.     Interim hx - 8/29/16: She's overall doing quite well on humira 40 mg q2 wks and methotrexate 8 tabs weekly. Denies any joint swelling/stiffness and denies any EMS. Still has 10 deg flexion contracture of L elbow but feels this continues to improve with PT. No recent colds/infections while on immunosuppressives and rarely has an injection site reaction. On \"double weekends\" where takes both humira and MTX on a Saturday, she notes the following Sunday she will feel fatigued with some stomach upset. Takes FA 1 mg daily. No active bowel concerns, not needing suppositories for her IBD. Last colonoscopy 2/2016 normal, repeat in 2 years.     Interim hx 12/19/2016:  Overall continues to do quite well. Has \"very occasional bouts\" of joint symptoms. For example one Sunday morning a month or so ago she helped catch at her daughter's softball practice. States she \"took several balls to the ankle\" and noted she woke up the next day with painful/stiff BL ankles which lasted several weeks. Denied swelling/warmth to ankles. Otherwise no joint issues, no EMS. Some GI upset on days she takes both Humira and MTX, but tried FA increased dosing around this time and sx's improved. Will have loose stools for a few days following her Humira.     Interim hx 4/3/2017:  Pt continues to do well. Denies any arthritis pain, swelling, or warmth and denies any EMS. Denies " back or neck pain. No recent flares. Tolerating Humira and MTX well. No injection site reactions. Stopped smoking 2 wks ago. Stopped walking about a year ago for exercise, but is interested in restarting this. Denies f/s/c, cp, SOB, abd pain, n/t, rash. Interested in learning about options for de-escalating therapy if she continues to be in remission.     Interim hx 7/10/2017:  Remains in remission. Very rarely has occasional pain in foot/hands. Painted her daughter's room yesterday and had some mild pain/swelling this morning, but otherwise doing well. Denies morning stiffness. She's been under increased stress recently and feels this impacts her athritis symptoms. 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. Has been walking more/staying active - walks rescue dog Blue. Plays softball with her daughter. She's unhappy with recent increase in total cholesterol levels at her PCP the other day. No other new health concerns. Tolerating MTX 6 tabs well w/o issue, no worsening since decreased dose from 8 tabs weekly following last visit.           Review of Systems:   Review Of Systems  Constitutional: denies f/s/c  Skin: No skin rash.  Eyes: No vision change, dry eyes  Ears/Nose/Throat: no oral/nasal ulcers  Respiratory: No SOB, cough  Cardiovascular: no cp  Gastrointestinal: no change in BM  Neurologic: no numbness, tingling.   Endocrine: +hx Hashimotos             Past Medical History:     Past Medical History:   Diagnosis Date     Dysplasia of cervix 1990's    had cryotx     Hyperlipidemia LDL goal < 130 7/31/2014     Hypothyroidism      Menopause age 48     Nephrolithiasis 1/11    s/p ESWL     Rheumatoid arthritis (H) 6/15     Smoker      Ulcerative colitis (H) 1-06     Past Surgical History:   Procedure Laterality Date     COLONOSCOPY  3-14-14     CRYOTHERAPY, CERVICAL  EARLY 1990's     HC REMOVAL GALLBLADDER  2-01    and repair ventral hernia            Social History:     Social History     Occupational History      [employer]     Social History Main Topics     Smoking status: Former Smoker     Packs/day: 0.25     Types: Cigarettes     Quit date: 5/22/2015     Smokeless tobacco: Never Used      Comment: E Cig      Alcohol use Yes      Comment: occasional wine (twice per year)     Drug use: No     Sexual activity: Not Currently     Partners: Male   smoked for 30 years - quit spring 2017  EtOH use - none  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          Family History:     Family History   Problem Relation Age of Onset     Breast Cancer Mother      Thyroid Disease Mother      DIABETES Maternal Grandmother      Hypertension Maternal Grandmother      CEREBROVASCULAR DISEASE Maternal Grandmother      CANCER Maternal Grandmother      Hypertension Paternal Grandmother      Breast Cancer Paternal Grandmother      CANCER Paternal Grandmother      Thyroid Disease Sister      Asthma Son             Allergies:     Allergies   Allergen Reactions     Codeine      Nausea and vomiting     Dye [Contrast Dye]             Medications:     Current Outpatient Prescriptions   Medication Sig Dispense Refill     levothyroxine (SYNTHROID/LEVOTHROID) 112 MCG tablet Take 1 tablet (112 mcg) by mouth daily 90 tablet 3     methotrexate 2.5 MG tablet CHEMO Take 6 tablets (15 mg) by mouth once a week Take 6 tablets, 15 mg  of methotrexate once a week 24 tablet 3     folic acid (FOLVITE) 1 MG tablet Take 1 tab daily. Day prior to, day of, and day after MTX take 2 tabs. 45 tablet 6     adalimumab (HUMIRA PEN) 40 MG/0.8ML pen kit Inject 0.8 mLs (40 mg) Subcutaneous every 14 days 6 each 1     ibuprofen (ADVIL,MOTRIN) 600 MG tablet Take 600 mg by mouth as needed Reported on 4/3/2017              Physical Exam:   /83 (BP Location: Right arm, Patient Position: Chair, Cuff Size: Adult Regular)  Pulse 61  Temp 98.1  F (36.7  C) (Oral)  Resp 16  Ht 1.61 m (5' 3.39\")  Wt 64.8 kg (142 lb 14.4 oz)  SpO2 98%  BMI 25.01 kg/m2  142 lbs 14.4 " oz     Constitutional: well-developed, appearing stated age; cooperative  Eyes: nl EOM, PERRLA, vision, conjunctiva, sclera  ENT: nl external ears, nose, hearing, lips, teeth, gums, throat  No mucous membrane lesions, normal saliva pool  Neck: no mass or thyroid enlargement  Resp: lungs clear to auscultation, nl to palpation  CV: RRR, no murmurs, rubs or gallops, no edema  GI: no ABD mass or tenderness, no HSM  Lymph: no cervical, supraclavicular, or epitrochlear nodes  MS: All TMJ, neck, shoulder, elbow, wrist, MCP/PIP/DIP, spine, hip, knee, ankle, and foot MTP/IP joints were examined.  - BL hands scattered very subtle swan neck deformities of DIPs  - mild joint laxity of BL wrists   - L elbow w/ 5 deg contraction flexure w/o active synovitis (unchanged)  - L hand 3rd PIP slight contraction flexure 2/2 prior fracture   - subtle diffuse swelling of hands BL without actual synovitis  Normal  strength. No dactylitis,  tenosynovitis, enthesopathy.  Skin: no nail pitting, alopecia, rash, lesions  Neuro: grossly nl cranial nerves, strength   Psych: nl judgement, orientation, memory, affect.         Data:     Results for orders placed or performed in visit on 06/30/17   MA Screen Bilateral w/Ramiro    Narrative    SCREENING MAMMOGRAM, BILATERAL, DIGITAL w/CAD AND TOMOSYNTHESIS -  6/30/2017 9:29 AM    BREAST SYMPTOMS: No current breast complaints.     COMPARISON:  06/16/2016, 06/11/2015, 06/10/2014, 06/05/2013.    BREAST DENSITY: Scattered fibroglandular densities.    COMMENTS: No findings of suspicion for malignancy.       Impression    IMPRESSION: BI-RADS CATEGORY: 1 -  Negative    RECOMMENDED FOLLOW-UP: Annual Mammography.    Exam results letter mailed to patient.      STEEV JJ MD     Cyclic Cit Pept IgG/IgA   Date Value Ref Range Status   07/06/2015 205 (H) <20 UNITS Final     Comment:     Strongly Positive     Hemoglobin   Date Value Ref Range Status   06/30/2017 13.3 11.7 - 15.7 g/dL Final   03/21/2017 13.0  11.7 - 15.7 g/dL Final   12/12/2016 13.9 11.7 - 15.7 g/dL Final     Urea Nitrogen   Date Value Ref Range Status   05/28/2015 18 7 - 30 mg/dL Final     Sed Rate   Date Value Ref Range Status   06/30/2017 7 0 - 30 mm/h Final   03/21/2017 6 0 - 30 mm/h Final   12/12/2016 6 0 - 30 mm/h Final     C-Reactive Protein   Date Value Ref Range Status   02/20/2015 <2.0 0 - 0.8 mg/dL Final     CRP Inflammation   Date Value Ref Range Status   06/30/2017 <2.9 0.0 - 8.0 mg/L Final   03/21/2017 <2.9 0.0 - 8.0 mg/L Final   12/12/2016 <2.9 0.0 - 8.0 mg/L Final     AST   Date Value Ref Range Status   06/30/2017 17 0 - 45 U/L Final   03/21/2017 21 0 - 45 U/L Final   12/12/2016 19 0 - 45 U/L Final     Albumin   Date Value Ref Range Status   08/22/2016 3.7 3.4 - 5.0 g/dL Final   05/19/2016 3.7 3.4 - 5.0 g/dL Final   05/28/2015 3.5 3.4 - 5.0 g/dL Final     Alkaline Phosphatase   Date Value Ref Range Status   05/28/2015 99 40 - 150 U/L Final     ALT   Date Value Ref Range Status   06/30/2017 24 0 - 50 U/L Final   03/21/2017 25 0 - 50 U/L Final   12/12/2016 23 0 - 50 U/L Final     Rheumatoid Factor   Date Value Ref Range Status   05/28/2015 49 (H) <20 IU/mL Final     Recent Labs   Lab Test  06/30/17   0845  03/21/17   0753  12/12/16   0830  12/12/16   0829   05/28/15   1631   WBC  6.2  5.1   --   6.6   < >  7.2   HGB  13.3  13.0   --   13.9   < >  13.5   HCT  38.1  37.8   --   40.2   < >  39.8   MCV  96  96   --   95   < >  92   PLT  241  233   --   258   < >  282   BUN   --    --    --    --    --   18   TSH  0.86   --   1.31   --    --   4.09*   AST  17  21   --   19   < >  16   ALT  24  25   --   23   < >  20   ALKPHOS   --    --    --    --    --   99    < > = values in this interval not displayed.      hand xray 8/2016:  Impression:  1. No acute osseous abnormality.  2. No radiographic evidence of inflammatory arthritis.    Reviewed Rheumatology lab flow sheet

## 2017-07-14 ENCOUNTER — MYC MEDICAL ADVICE (OUTPATIENT)
Dept: RHEUMATOLOGY | Facility: CLINIC | Age: 57
End: 2017-07-14

## 2017-07-14 DIAGNOSIS — M25.522 ELBOW PAIN, LEFT: ICD-10-CM

## 2017-07-14 DIAGNOSIS — Z79.899 ENCOUNTER FOR LONG-TERM (CURRENT) USE OF HIGH-RISK MEDICATION: ICD-10-CM

## 2017-07-14 DIAGNOSIS — M05.79 RHEUMATOID ARTHRITIS INVOLVING MULTIPLE SITES WITH POSITIVE RHEUMATOID FACTOR (H): ICD-10-CM

## 2017-09-05 DIAGNOSIS — M05.79 RHEUMATOID ARTHRITIS INVOLVING MULTIPLE SITES WITH POSITIVE RHEUMATOID FACTOR (H): ICD-10-CM

## 2017-09-05 DIAGNOSIS — Z79.899 ENCOUNTER FOR LONG-TERM (CURRENT) USE OF HIGH-RISK MEDICATION: ICD-10-CM

## 2017-09-05 DIAGNOSIS — M25.522 ELBOW PAIN, LEFT: ICD-10-CM

## 2017-09-05 NOTE — TELEPHONE ENCOUNTER
methotrexate 2.5 MG tablet CHEMO    Last Written Prescription Date:  4/3/17  Last Fill Quantity: 24,   # refills: 3  Last Office Visit with [facility] or [facility] prescribing provider:  7/10/17  Future Office visit:   1/8/18    Lab Results   Component Value Date    WBC 6.2 06/30/2017     Lab Results   Component Value Date    RBC 3.99 06/30/2017     Lab Results   Component Value Date    HGB 13.3 06/30/2017     Lab Results   Component Value Date    HCT 38.1 06/30/2017     No components found for: MCT  Lab Results   Component Value Date    MCV 96 06/30/2017     Lab Results   Component Value Date    MCH 33.3 06/30/2017     Lab Results   Component Value Date    MCHC 34.9 06/30/2017     Lab Results   Component Value Date    RDW 12.8 06/30/2017     Lab Results   Component Value Date     06/30/2017     Lab Results   Component Value Date    AST 17 06/30/2017     Lab Results   Component Value Date    ALT 24 06/30/2017     Lab Results   Component Value Date    ALBUMIN 3.7 08/22/2016     Creatinine   Date Value Ref Range Status   06/30/2017 0.74 0.52 - 1.04 mg/dL Final   ]      Routing refill request to provider for review/approval because:   methotrexate 2.5 MG tablet CHEMO

## 2017-10-09 ENCOUNTER — DOCUMENTATION ONLY (OUTPATIENT)
Dept: LAB | Facility: CLINIC | Age: 57
End: 2017-10-09

## 2017-10-11 DIAGNOSIS — Z51.81 ENCOUNTER FOR THERAPEUTIC DRUG MONITORING: ICD-10-CM

## 2017-10-11 LAB
ALT SERPL W P-5'-P-CCNC: 20 U/L (ref 0–50)
AST SERPL W P-5'-P-CCNC: 11 U/L (ref 0–45)
BASOPHILS # BLD AUTO: 0.1 10E9/L (ref 0–0.2)
BASOPHILS NFR BLD AUTO: 1.2 %
CREAT SERPL-MCNC: 0.78 MG/DL (ref 0.52–1.04)
CRP SERPL-MCNC: <2.9 MG/L (ref 0–8)
DIFFERENTIAL METHOD BLD: ABNORMAL
EOSINOPHIL # BLD AUTO: 0.2 10E9/L (ref 0–0.7)
EOSINOPHIL NFR BLD AUTO: 3.9 %
ERYTHROCYTE [DISTWIDTH] IN BLOOD BY AUTOMATED COUNT: 13 % (ref 10–15)
ERYTHROCYTE [SEDIMENTATION RATE] IN BLOOD BY WESTERGREN METHOD: 7 MM/H (ref 0–30)
GFR SERPL CREATININE-BSD FRML MDRD: 77 ML/MIN/1.7M2
HCT VFR BLD AUTO: 33.4 % (ref 35–47)
HGB BLD-MCNC: 11.4 G/DL (ref 11.7–15.7)
LYMPHOCYTES # BLD AUTO: 1.7 10E9/L (ref 0.8–5.3)
LYMPHOCYTES NFR BLD AUTO: 30.7 %
MCH RBC QN AUTO: 33 PG (ref 26.5–33)
MCHC RBC AUTO-ENTMCNC: 34.1 G/DL (ref 31.5–36.5)
MCV RBC AUTO: 97 FL (ref 78–100)
MONOCYTES # BLD AUTO: 0.5 10E9/L (ref 0–1.3)
MONOCYTES NFR BLD AUTO: 9.4 %
NEUTROPHILS # BLD AUTO: 3.1 10E9/L (ref 1.6–8.3)
NEUTROPHILS NFR BLD AUTO: 54.8 %
PLATELET # BLD AUTO: 228 10E9/L (ref 150–450)
RBC # BLD AUTO: 3.45 10E12/L (ref 3.8–5.2)
WBC # BLD AUTO: 5.6 10E9/L (ref 4–11)

## 2017-10-11 PROCEDURE — 84460 ALANINE AMINO (ALT) (SGPT): CPT | Performed by: INTERNAL MEDICINE

## 2017-10-11 PROCEDURE — 86140 C-REACTIVE PROTEIN: CPT | Performed by: INTERNAL MEDICINE

## 2017-10-11 PROCEDURE — 36415 COLL VENOUS BLD VENIPUNCTURE: CPT | Performed by: INTERNAL MEDICINE

## 2017-10-11 PROCEDURE — 85652 RBC SED RATE AUTOMATED: CPT | Performed by: INTERNAL MEDICINE

## 2017-10-11 PROCEDURE — 85025 COMPLETE CBC W/AUTO DIFF WBC: CPT | Performed by: INTERNAL MEDICINE

## 2017-10-11 PROCEDURE — 82565 ASSAY OF CREATININE: CPT | Performed by: INTERNAL MEDICINE

## 2017-10-11 PROCEDURE — 84450 TRANSFERASE (AST) (SGOT): CPT | Performed by: INTERNAL MEDICINE

## 2017-11-08 ENCOUNTER — TRANSFERRED RECORDS (OUTPATIENT)
Dept: HEALTH INFORMATION MANAGEMENT | Facility: CLINIC | Age: 57
End: 2017-11-08

## 2017-11-11 DIAGNOSIS — M25.522 ELBOW PAIN, LEFT: ICD-10-CM

## 2017-11-11 DIAGNOSIS — M05.79 RHEUMATOID ARTHRITIS INVOLVING MULTIPLE SITES WITH POSITIVE RHEUMATOID FACTOR (H): ICD-10-CM

## 2017-11-11 DIAGNOSIS — Z79.899 ENCOUNTER FOR LONG-TERM (CURRENT) USE OF HIGH-RISK MEDICATION: ICD-10-CM

## 2017-11-13 RX ORDER — FOLIC ACID 1 MG/1
TABLET ORAL
Qty: 102 TABLET | Refills: 3 | Status: SHIPPED | OUTPATIENT
Start: 2017-11-13 | End: 2018-06-28

## 2017-11-13 NOTE — TELEPHONE ENCOUNTER
Last Written Prescription Date:  4/3/17  Last Fill Quantity: 45,   # refills: 6  Last Office Visit : 7/10/17  Future Office visit:  1/8/18

## 2017-12-05 DIAGNOSIS — M05.79 RHEUMATOID ARTHRITIS INVOLVING MULTIPLE SITES WITH POSITIVE RHEUMATOID FACTOR (H): ICD-10-CM

## 2017-12-05 DIAGNOSIS — Z79.899 ENCOUNTER FOR LONG-TERM (CURRENT) USE OF HIGH-RISK MEDICATION: ICD-10-CM

## 2017-12-05 DIAGNOSIS — M25.522 ELBOW PAIN, LEFT: ICD-10-CM

## 2017-12-05 NOTE — TELEPHONE ENCOUNTER
methotrexate  Last Written Prescription Date:  9/5/17  Last Fill Quantity: 72,   # refills: 0  Last Office Visit: 7/10/17  Future Office visit:  1/8/18    CBC RESULTS:   Recent Labs   Lab Test  10/11/17   0849   WBC  5.6   RBC  3.45*   HGB  11.4*   HCT  33.4*   MCV  97   MCH  33.0   MCHC  34.1   RDW  13.0   PLT  228       Creatinine   Date Value Ref Range Status   10/11/2017 0.78 0.52 - 1.04 mg/dL Final   ]    Liver Function Studies -   Recent Labs   Lab Test  10/11/17   0849   08/22/16   0659   05/28/15   1631   PROTTOTAL   --    --    --    --   7.8   ALBUMIN   --    --   3.7   < >  3.5   BILITOTAL   --    --    --    --   0.7   ALKPHOS   --    --    --    --   99   AST  11   < >  15   < >  16   ALT  20   < >  20   < >  20    < > = values in this interval not displayed.       Routing refill request to provider for review/approval because:  Drug not on the G, P or [facility] refill protocol or controlled substance

## 2017-12-29 DIAGNOSIS — M05.79 RHEUMATOID ARTHRITIS INVOLVING MULTIPLE SITES WITH POSITIVE RHEUMATOID FACTOR (H): ICD-10-CM

## 2017-12-29 DIAGNOSIS — Z79.899 ENCOUNTER FOR LONG-TERM (CURRENT) USE OF HIGH-RISK MEDICATION: ICD-10-CM

## 2017-12-29 LAB
ALBUMIN SERPL-MCNC: 3.5 G/DL (ref 3.4–5)
ALT SERPL W P-5'-P-CCNC: 26 U/L (ref 0–50)
AST SERPL W P-5'-P-CCNC: 18 U/L (ref 0–45)
BASOPHILS # BLD AUTO: 0.1 10E9/L (ref 0–0.2)
BASOPHILS NFR BLD AUTO: 0.9 %
CREAT SERPL-MCNC: 0.84 MG/DL (ref 0.52–1.04)
DIFFERENTIAL METHOD BLD: ABNORMAL
EOSINOPHIL # BLD AUTO: 0.2 10E9/L (ref 0–0.7)
EOSINOPHIL NFR BLD AUTO: 3 %
ERYTHROCYTE [DISTWIDTH] IN BLOOD BY AUTOMATED COUNT: 12.7 % (ref 10–15)
GFR SERPL CREATININE-BSD FRML MDRD: 71 ML/MIN/1.7M2
HCT VFR BLD AUTO: 36.1 % (ref 35–47)
HGB BLD-MCNC: 12.4 G/DL (ref 11.7–15.7)
LYMPHOCYTES # BLD AUTO: 1.8 10E9/L (ref 0.8–5.3)
LYMPHOCYTES NFR BLD AUTO: 24.9 %
MCH RBC QN AUTO: 33.2 PG (ref 26.5–33)
MCHC RBC AUTO-ENTMCNC: 34.3 G/DL (ref 31.5–36.5)
MCV RBC AUTO: 97 FL (ref 78–100)
MONOCYTES # BLD AUTO: 0.7 10E9/L (ref 0–1.3)
MONOCYTES NFR BLD AUTO: 9.6 %
NEUTROPHILS # BLD AUTO: 4.5 10E9/L (ref 1.6–8.3)
NEUTROPHILS NFR BLD AUTO: 61.6 %
PLATELET # BLD AUTO: 244 10E9/L (ref 150–450)
RBC # BLD AUTO: 3.74 10E12/L (ref 3.8–5.2)
WBC # BLD AUTO: 7.4 10E9/L (ref 4–11)

## 2017-12-29 PROCEDURE — 36415 COLL VENOUS BLD VENIPUNCTURE: CPT | Performed by: INTERNAL MEDICINE

## 2017-12-29 PROCEDURE — 84460 ALANINE AMINO (ALT) (SGPT): CPT | Performed by: INTERNAL MEDICINE

## 2017-12-29 PROCEDURE — 84450 TRANSFERASE (AST) (SGOT): CPT | Performed by: INTERNAL MEDICINE

## 2017-12-29 PROCEDURE — 82040 ASSAY OF SERUM ALBUMIN: CPT | Performed by: INTERNAL MEDICINE

## 2017-12-29 PROCEDURE — 82565 ASSAY OF CREATININE: CPT | Performed by: INTERNAL MEDICINE

## 2017-12-29 PROCEDURE — 85025 COMPLETE CBC W/AUTO DIFF WBC: CPT | Performed by: INTERNAL MEDICINE

## 2018-01-08 ENCOUNTER — OFFICE VISIT (OUTPATIENT)
Dept: RHEUMATOLOGY | Facility: CLINIC | Age: 58
End: 2018-01-08
Attending: INTERNAL MEDICINE
Payer: COMMERCIAL

## 2018-01-08 VITALS
OXYGEN SATURATION: 99 % | DIASTOLIC BLOOD PRESSURE: 78 MMHG | TEMPERATURE: 97.7 F | HEART RATE: 65 BPM | SYSTOLIC BLOOD PRESSURE: 133 MMHG | BODY MASS INDEX: 25.3 KG/M2 | WEIGHT: 144.6 LBS

## 2018-01-08 DIAGNOSIS — Z79.899 ENCOUNTER FOR LONG-TERM (CURRENT) USE OF HIGH-RISK MEDICATION: ICD-10-CM

## 2018-01-08 DIAGNOSIS — M05.79 RHEUMATOID ARTHRITIS INVOLVING MULTIPLE SITES WITH POSITIVE RHEUMATOID FACTOR (H): ICD-10-CM

## 2018-01-08 DIAGNOSIS — M25.522 ELBOW PAIN, LEFT: ICD-10-CM

## 2018-01-08 PROCEDURE — G0463 HOSPITAL OUTPT CLINIC VISIT: HCPCS | Mod: ZF

## 2018-01-08 ASSESSMENT — PAIN SCALES - GENERAL: PAINLEVEL: NO PAIN (0)

## 2018-01-08 ASSESSMENT — PATIENT HEALTH QUESTIONNAIRE - PHQ9: SUM OF ALL RESPONSES TO PHQ QUESTIONS 1-9: 0

## 2018-01-08 NOTE — LETTER
1/8/2018      RE: Carolina Mora  [address]  [facility] [address]       Rheumatology Clinic Visit-Fellow Note     Carolina Mora MRN# [id number]   YOB: 1960 Age: 55 year old     Date of Visit :Jan 8, 2018     Primary care provider: Michael Beltrán (General)          Assessment and Plan:   Assessment   # seropositive RA, non-erosive (RF 49/ACPA 205), dx 7/2015. Reportedly prior rheumatoid nodules (BL elbow, R hand, R foot - resolved)  # UC, in remission - dx >10y ago during her 2nd pregnancy, presented w/ hematochezia. Colonoscopy 2/2016 normal, repeat due 2/2018  # Hashimoto's thyroiditis  # Ongoing tobacco abuse  # fam hx of AI diseases - psoriasis, celiac, Crohn's   # hx of BL plantar fasciitis - resolved  # hx of L hand 3rd digit fracture  # trigger finger L hand 3rd digit  # HLD    RA remains in remission with no disease activity by history or on exam today. Will continue with slow de-escalation of therapy and have her decrease MTX from 6 to 4 tabs weekly. Has some e/o chronic deformities from prior active RA including L elbow flexion contracture and L hand 3rd MCP laxity which should not be expected to improve. Regarding her mild stomach upset, if decreasing dose alone doesn't help, can split dose, increase folic acid, and ultimately we can try switching to injection form.     Plan:  - continue Humira 40 mg SQ q2 weeks  - decrease MTX to 4 tabs weekly. Try splitting 2 tabs in AM/2 tabs in PM  - increase folic acid to 2 mg daily   - labs q3 months  - continue working on smoking cessation  - if trigger finger worsens but no other signs active RA, could inject     RTC in 6 mo with new Fellow    RHM  HBV: negative 7/2015  HCV: negative 7/2015  Quant gold: negative 12/2015  HIV: never tested per chart review - recommend checking at least 1x  Cancer screenings: colonoscopy 2/2016 normal, mammo 6/2016 normal  Immunizations: UTD on qfeoxky24, PPSV23, flu  Bone health: DEXA spring 2017  normal    Pt seen and discussed with Dr. Donnie Guajardo MD  Rheumatology Fellow   Pager [phone]    I saw this patient with the Rheumatology Fellow. I agree with the findings and recommendations.    Augustine Fields M.D.  Staff Rheumatologist, [facility]  Pager [phone]          Active Problem List:     Patient Active Problem List    Diagnosis Date Noted     Hypothyroidism due to acquired atrophy of thyroid 06/01/2016     Priority: Medium     Rheumatoid arthritis involving multiple sites with positive rheumatoid factor (H) 02/29/2016     Priority: Medium     Encounter for long-term (current) use of high-risk medication 02/29/2016     Priority: Medium     Hyperlipidemia with target LDL less than 130 07/31/2014     Priority: Medium     Diagnosis updated by automated process. Provider to review and confirm.       Ventral hernia without obstruction or gangrene 07/31/2014     Priority: Medium     History of nephrolithiasis 01/04/2013     Priority: Medium     Ulcerative colitis (H)      Priority: Medium            History of Present Illness:     Rheumatological history:  Pt is a 57 year old female who was initially referred to rheumatology 7/2015 for 6m history of polyarticular inflammatory arthritis and hand paresthesias. Found to have +RF/CCP and started on MTX and prednisone with significant improvement. Started on Humira 1/2016 given persistent inflammatory arthritis. Has been in remission basically since that time. Started to titrate down on MTX from 8 to 6 tabs spring 2017 with no flare.     Last OV: 7/2017    Interim hx 1/8/2018:  Doing well today. No flares in joint pain. Very ocasionaly when stands up out of bed in morning will have pain in feet, but this resolves in seconds. Not needing to take any NSAIDs. L hand 3rd digit occasionally triggers. No joint swelling or morning stiffness. No paresthesias. Back to smoking 1 pack per week and this is distressing to her. Has some mild persistent  stomach upset, no actual nausea/vomiting, abdominal pain, diarrhea, or constipation, but it is something she notices. Briefly tried to increase folic acid around time she takes methotrexate but this didn't help.            Review of Systems:   Review Of Systems  Constitutional: denies f/s/c  Skin: No skin rash.  Eyes: No vision change, dry eyes  Ears/Nose/Throat: no oral/nasal ulcers  Respiratory: No SOB, cough  Cardiovascular: no cp, palpitations  Gastrointestinal: no change in BM  Neurologic: no numbness, tingling.   Endocrine: +hx Hashimotos             Past Medical History:     Past Medical History:   Diagnosis Date     Dysplasia of cervix 1990's    had cryotx     Hyperlipidemia LDL goal < 130 7/31/2014     Hypothyroidism      Menopause age 48     Nephrolithiasis 1/11    s/p ESWL     Rheumatoid arthritis (H) 6/15     Smoker      Ulcerative colitis (H) 1-06     Past Surgical History:   Procedure Laterality Date     COLONOSCOPY  3-14-14     CRYOTHERAPY, CERVICAL  EARLY 1990's     HC REMOVAL GALLBLADDER  2-01    and repair ventral hernia            Social History:     Social History     Occupational History      [employer]     Social History Main Topics     Smoking status: Former Smoker     Packs/day: 0.25     Types: Cigarettes     Quit date: 5/22/2015     Smokeless tobacco: Never Used      Comment: E Cig      Alcohol use Yes      Comment: occasional wine (twice per year)     Drug use: No     Sexual activity: Not Currently     Partners: Male   smoked for 30 years - quit spring 2017, restarted now smoking 1 pack per week  EtOH use - none  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          Family History:     Family History   Problem Relation Age of Onset     Breast Cancer Mother      Thyroid Disease Mother      DIABETES Maternal Grandmother      Hypertension Maternal Grandmother      CEREBROVASCULAR DISEASE Maternal Grandmother      CANCER Maternal Grandmother      Hypertension Paternal  Grandmother      Breast Cancer Paternal Grandmother      CANCER Paternal Grandmother      Thyroid Disease Sister      Asthma Son             Allergies:     Allergies   Allergen Reactions     Codeine      Nausea and vomiting     Dye [Contrast Dye]             Medications:     Current Outpatient Prescriptions   Medication Sig Dispense Refill     methotrexate 2.5 MG tablet CHEMO Take 4 tablets (10 mg) by mouth once a week 16 tablet 3     folic acid (FOLVITE) 1 MG tablet Take 1 tab daily. Day prior to, day of, and day after MTX take 2 tabs. 102 tablet 3     adalimumab (HUMIRA PEN) 40 MG/0.8ML pen kit Inject 0.8 mLs (40 mg) Subcutaneous every 14 days 6 each 1     levothyroxine (SYNTHROID/LEVOTHROID) 112 MCG tablet Take 1 tablet (112 mcg) by mouth daily 90 tablet 3     ibuprofen (ADVIL,MOTRIN) 600 MG tablet Take 600 mg by mouth as needed Reported on 4/3/2017       [DISCONTINUED] methotrexate 2.5 MG tablet CHEMO Take 6 tablets (15 mg) by mouth once a week Take 6 tablets, 15 mg  of methotrexate once a week 72 tablet 0            Physical Exam:   /78  Pulse 65  Temp 97.7  F (36.5  C) (Oral)  Wt 65.6 kg (144 lb 9.6 oz)  SpO2 99%  BMI 25.3 kg/m2  144 lbs 9.6 oz     Constitutional: well-developed, appearing stated age; cooperative  Eyes: nl EOM, PERRLA, vision, conjunctiva, sclera  ENT: nl external ears, nose, hearing, lips, teeth, gums, throat  No mucous membrane lesions, normal saliva pool  Neck: no mass or thyroid enlargement  Resp: lungs clear to auscultation, nl to palpation  CV: RRR, no murmurs, rubs or gallops, no edema  GI: no ABD mass or tenderness, no HSM  Lymph: no cervical, supraclavicular, or epitrochlear nodes  MS: All TMJ, neck, shoulder, elbow, wrist, MCP/PIP/DIP, spine, hip, knee, ankle, and foot MTP/IP joints were examined.  - mild joint laxity of BL wrists   - L elbow w/ 5 deg contraction flexure w/o active synovitis (unchanged)  - L hand 3rd PIP slight contraction flexure 2/2 prior fracture. Mild  laxity of 3rd MCP without synovitis.   Normal  strength. No dactylitis,  tenosynovitis, enthesopathy.  Skin: no nail pitting, alopecia, rash, lesions  Neuro: grossly nl cranial nerves, strength   Psych: nl judgement, orientation, memory, affect.         Data:     Results for orders placed or performed in visit on 12/29/17   Albumin level   Result Value Ref Range    Albumin 3.5 3.4 - 5.0 g/dL   ALT   Result Value Ref Range    ALT 26 0 - 50 U/L   AST   Result Value Ref Range    AST 18 0 - 45 U/L   CBC with platelets differential   Result Value Ref Range    WBC 7.4 4.0 - 11.0 10e9/L    RBC Count 3.74 (L) 3.8 - 5.2 10e12/L    Hemoglobin 12.4 11.7 - 15.7 g/dL    Hematocrit 36.1 35.0 - 47.0 %    MCV 97 78 - 100 fl    MCH 33.2 (H) 26.5 - 33.0 pg    MCHC 34.3 31.5 - 36.5 g/dL    RDW 12.7 10.0 - 15.0 %    Platelet Count 244 150 - 450 10e9/L    Diff Method Automated Method     % Neutrophils 61.6 %    % Lymphocytes 24.9 %    % Monocytes 9.6 %    % Eosinophils 3.0 %    % Basophils 0.9 %    Absolute Neutrophil 4.5 1.6 - 8.3 10e9/L    Absolute Lymphocytes 1.8 0.8 - 5.3 10e9/L    Absolute Monocytes 0.7 0.0 - 1.3 10e9/L    Absolute Eosinophils 0.2 0.0 - 0.7 10e9/L    Absolute Basophils 0.1 0.0 - 0.2 10e9/L   Creatinine   Result Value Ref Range    Creatinine 0.84 0.52 - 1.04 mg/dL    GFR Estimate 71 >60 mL/min/1.7m2    GFR Estimate If Black 86 >60 mL/min/1.7m2     Cyclic Cit Pept IgG/IgA   Date Value Ref Range Status   07/06/2015 205 (H) <20 UNITS Final     Comment:     Strongly Positive     Hemoglobin   Date Value Ref Range Status   12/29/2017 12.4 11.7 - 15.7 g/dL Final   10/11/2017 11.4 (L) 11.7 - 15.7 g/dL Final   06/30/2017 13.3 11.7 - 15.7 g/dL Final     Urea Nitrogen   Date Value Ref Range Status   05/28/2015 18 7 - 30 mg/dL Final     Sed Rate   Date Value Ref Range Status   10/11/2017 7 0 - 30 mm/h Final   06/30/2017 7 0 - 30 mm/h Final   03/21/2017 6 0 - 30 mm/h Final     C-Reactive Protein   Date Value Ref Range Status    02/20/2015 <2.0 0 - 0.8 mg/dL Final     CRP Inflammation   Date Value Ref Range Status   10/11/2017 <2.9 0.0 - 8.0 mg/L Final   06/30/2017 <2.9 0.0 - 8.0 mg/L Final   03/21/2017 <2.9 0.0 - 8.0 mg/L Final     AST   Date Value Ref Range Status   12/29/2017 18 0 - 45 U/L Final   10/11/2017 11 0 - 45 U/L Final   06/30/2017 17 0 - 45 U/L Final     Albumin   Date Value Ref Range Status   12/29/2017 3.5 3.4 - 5.0 g/dL Final   08/22/2016 3.7 3.4 - 5.0 g/dL Final   05/19/2016 3.7 3.4 - 5.0 g/dL Final     Alkaline Phosphatase   Date Value Ref Range Status   05/28/2015 99 40 - 150 U/L Final     ALT   Date Value Ref Range Status   12/29/2017 26 0 - 50 U/L Final   10/11/2017 20 0 - 50 U/L Final   06/30/2017 24 0 - 50 U/L Final     Rheumatoid Factor   Date Value Ref Range Status   05/28/2015 49 (H) <20 IU/mL Final     Recent Labs   Lab Test  12/29/17   0756  10/11/17   0849  06/30/17   0845   12/12/16   0830   05/28/15   1631   WBC  7.4  5.6  6.2   < >   --    < >  7.2   HGB  12.4  11.4*  13.3   < >   --    < >  13.5   HCT  36.1  33.4*  38.1   < >   --    < >  39.8   MCV  97  97  96   < >   --    < >  92   PLT  244  228  241   < >   --    < >  282   BUN   --    --    --    --    --    --   18   TSH   --    --   0.86   --   1.31   --   4.09*   AST  18  11  17   < >   --    < >  16   ALT  26  20  24   < >   --    < >  20   ALKPHOS   --    --    --    --    --    --   99    < > = values in this interval not displayed.     BL hand xray 8/2016:  Impression:  1. No acute osseous abnormality.  2. No radiographic evidence of inflammatory arthritis.    Reviewed Rheumatology lab flow sheet      Ann Marie Guajardo MD

## 2018-01-08 NOTE — NURSING NOTE
"Chief Complaint   Patient presents with     RECHECK     Rheum follow up       Initial /78  Pulse 65  Temp 97.7  F (36.5  C) (Oral)  Wt 65.6 kg (144 lb 9.6 oz)  SpO2 99%  BMI 25.3 kg/m2 Estimated body mass index is 25.3 kg/(m^2) as calculated from the following:    Height as of 7/10/17: 1.61 m (5' 3.39\").    Weight as of this encounter: 65.6 kg (144 lb 9.6 oz).  Medication Reconciliation: complete   CORBY AMBROCIO CMA      "

## 2018-01-08 NOTE — MR AVS SNAPSHOT
After Visit Summary   1/8/2018    Carolina Mora    MRN: [id number]           Patient Information     Date Of Birth          1960        Visit Information        Provider Department      1/8/2018 8:30 AM Ann Marie Guajardo MD [facility]        Today's Diagnoses     Rheumatoid arthritis involving multiple sites with positive rheumatoid factor (H)        Encounter for long-term (current) use of high-risk medication        Elbow pain, left          Care Instructions    Can decrease methotrexate to 4 tablets per week (can split 2 in AM and 2 in PM)  Can increase folic acid to 2 tablets every day - let me know if you continue to do this and I will change the prescription   There is also option of injecting methotrexate (which bypasses GI tract)   Continue Humira every 2 weeks  Continue labs every 3 months  If trigger finger continues or gets worse, we could send you to hand therapy and/or inject the area             Follow-ups after your visit        Follow-up notes from your care team     Return in about 6 months (around 7/8/2018).      Your next 10 appointments already scheduled     Jul 09, 2018  8:00 AM CDT   (Arrive by 7:45 AM)   Return Visit with Ann Marie Guajardo MD   [facility] ([facility])    [address] [place] [address]  [address]  [facility] [address] [phone]              Who to contact     If you have questions or need follow up information about today's clinic visit or your schedule please contact [facility] R[facility]AT[facility] directly at [phone].  Normal or non-critical lab and imaging results will be communicated to you by MyChart, letter or phone within 4 business days after the clinic has received the results. If you do not hear from us within 7 days, please contact the clinic through MyChart or phone. If you have a critical or abnormal lab result, we will notify you by phone as soon as possible.  Submit refill requests through  [employer] or call your pharmacy and they will forward the refill request to us. Please allow 3 business days for your refill to be completed.          Additional Information About Your Visit        [employer] Information     [employer] gives you secure access to your electronic health record. If you see a primary care provider, you can also send messages to your care team and make appointments. If you have questions, please call your primary care clinic.  If you do not have a primary care provider, please call [phone] and they will assist you.        Care EveryWhere ID     This is your Care EveryWhere ID. This could be used by other organizations to access your [facility] medical records  [id number]        Your Vitals Were     Pulse Temperature Pulse Oximetry BMI (Body Mass Index)          65 97.7  F (36.5  C) (Oral) 99% 25.3 kg/m2         Blood Pressure from Last 3 Encounters:   01/08/18 133/78   07/10/17 125/83   06/30/17 124/71    Weight from Last 3 Encounters:   01/08/18 65.6 kg (144 lb 9.6 oz)   07/10/17 64.8 kg (142 lb 14.4 oz)   06/30/17 64.4 kg (142 lb)              Today, you had the following     No orders found for display         Today's Medication Changes          These changes are accurate as of: 1/8/18  9:17 AM.  If you have any questions, ask your nurse or doctor.               These medicines have changed or have updated prescriptions.        Dose/Directions    methotrexate 2.5 MG tablet CHEMO   This may have changed:    - how much to take  - additional instructions   Used for:  Rheumatoid arthritis involving multiple sites with positive rheumatoid factor (H), Encounter for long-term (current) use of high-risk medication, Elbow pain, left   Changed by:  Ann Marie Guajardo MD        Dose:  10 mg   Take 4 tablets (10 mg) by mouth once a week   Quantity:  16 tablet   Refills:  3            Where to get your medicines      These medications were sent to [place] [id number] [place] [address]  [facility]  [address] [facility] [address]     Phone:  [phone]     methotrexate 2.5 MG tablet CHEMO                Primary Care Provider Office Phone # Fax #    Michael Beltrán [phone]       [address] [facility] [address]        Equal Access to Services     TOMAS MAE : Hadii aad ku hadasho Soomaali, waaxda luqadaha, qaybta kaalmada adeegyada, waxay ileanain hayaan adetayler khsandradaren mallory . So [facility] [phone].    ATENCIÓN: Si habla espashley, tiene a oneill disposición servicios gratuitos de asistencia lingüística. [facility] [phone].    We comply with applicable federal civil rights laws and [place] laws. We do not discriminate on the basis of race, color, national origin, age, disability, sex, sexual orientation, or gender identity.            Thank you!     Thank you for choosing [facility] RHEUMATOLOGY  for your care. Our goal is always to provide you with excellent care. Hearing back from our patients is one way we can continue to improve our services. Please take a few minutes to complete the written survey that you may receive in the mail after your visit with us. Thank you!             Your Updated Medication List - Protect others around you: Learn how to safely use, store and throw away your medicines at www.disposemymeds.org.          This list is accurate as of: 1/8/18  9:17 AM.  Always use your most recent med list.                   Brand Name Dispense Instructions for use Diagnosis    adalimumab 40 MG/0.8ML pen kit    HUMIRA PEN    6 each    Inject 0.8 mLs (40 mg) Subcutaneous every 14 days    Encounter for long-term (current) use of high-risk medication, Elbow pain, left, Rheumatoid arthritis involving multiple sites with positive rheumatoid factor (H)       folic acid 1 MG tablet    FOLVITE    102 tablet    Take 1 tab daily. Day prior to, day of, and day after MTX take 2 tabs.    Encounter for long-term (current) use of high-risk medication,  Rheumatoid arthritis involving multiple sites with positive rheumatoid factor (H), Elbow pain, left       ibuprofen 600 MG tablet    ADVIL/MOTRIN     Take 600 mg by mouth as needed Reported on 4/3/2017        levothyroxine 112 MCG tablet    SYNTHROID/LEVOTHROID    90 tablet    Take 1 tablet (112 mcg) by mouth daily    Hypothyroidism due to acquired atrophy of thyroid       methotrexate 2.5 MG tablet CHEMO     16 tablet    Take 4 tablets (10 mg) by mouth once a week    Rheumatoid arthritis involving multiple sites with positive rheumatoid factor (H), Encounter for long-term (current) use of high-risk medication, Elbow pain, left

## 2018-01-08 NOTE — PATIENT INSTRUCTIONS
Can decrease methotrexate to 4 tablets per week (can split 2 in AM and 2 in PM)  Can increase folic acid to 2 tablets every day - let me know if you continue to do this and I will change the prescription   There is also option of injecting methotrexate (which bypasses GI tract)   Continue Humira every 2 weeks  Continue labs every 3 months  If trigger finger continues or gets worse, we could send you to hand therapy and/or inject the area

## 2018-01-08 NOTE — PROGRESS NOTES
Rheumatology Clinic Visit-Fellow Note     Carolina Mora MRN# [id number]   YOB: 1960 Age: 55 year old     Date of Visit :Jan 8, 2018     Primary care provider: Michael Beltrán (General)          Assessment and Plan:   Assessment   # seropositive RA, non-erosive (RF 49/ACPA 205), dx 7/2015. Reportedly prior rheumatoid nodules (BL elbow, R hand, R foot - resolved)  # UC, in remission - dx >10y ago during her 2nd pregnancy, presented w/ hematochezia. Colonoscopy 2/2016 normal, repeat due 2/2018  # Hashimoto's thyroiditis  # Ongoing tobacco abuse  # fam hx of AI diseases - psoriasis, celiac, Crohn's   # hx of BL plantar fasciitis - resolved  # hx of L hand 3rd digit fracture  # trigger finger L hand 3rd digit  # HLD    RA remains in remission with no disease activity by history or on exam today. Will continue with slow de-escalation of therapy and have her decrease MTX from 6 to 4 tabs weekly. Has some e/o chronic deformities from prior active RA including L elbow flexion contracture and L hand 3rd MCP laxity which should not be expected to improve. Regarding her mild stomach upset, if decreasing dose alone doesn't help, can split dose, increase folic acid, and ultimately we can try switching to injection form.     Plan:  - continue Humira 40 mg SQ q2 weeks  - decrease MTX to 4 tabs weekly. Try splitting 2 tabs in AM/2 tabs in PM  - increase folic acid to 2 mg daily   - labs q3 months  - continue working on smoking cessation  - if trigger finger worsens but no other signs active RA, could inject     RTC in 6 mo with new Fellow    RHM  HBV: negative 7/2015  HCV: negative 7/2015  Quant gold: negative 12/2015  HIV: never tested per chart review - recommend checking at least 1x  Cancer screenings: colonoscopy 2/2016 normal, mammo 6/2016 normal  Immunizations: UTD on [id number], [id number], flu  Bone health: DEXA spring 2017 normal    Pt seen and discussed with Dr. Donnie Guajardo MD  Rheumatology  Fellow   Pager [phone]    I saw this patient with the Rheumatology Fellow. I agree with the findings and recommendations.    Augustine Fields M.D.  Staff Rheumatologist, [facility]  Pager [phone]          Active Problem List:     Patient Active Problem List    Diagnosis Date Noted     Hypothyroidism due to acquired atrophy of thyroid 06/01/2016     Priority: Medium     Rheumatoid arthritis involving multiple sites with positive rheumatoid factor (H) 02/29/2016     Priority: Medium     Encounter for long-term (current) use of high-risk medication 02/29/2016     Priority: Medium     Hyperlipidemia with target LDL less than 130 07/31/2014     Priority: Medium     Diagnosis updated by automated process. Provider to review and confirm.       Ventral hernia without obstruction or gangrene 07/31/2014     Priority: Medium     History of nephrolithiasis 01/04/2013     Priority: Medium     Ulcerative colitis (H)      Priority: Medium            History of Present Illness:     Rheumatological history:  Pt is a 57 year old female who was initially referred to rheumatology 7/2015 for 6m history of polyarticular inflammatory arthritis and hand paresthesias. Found to have +RF/CCP and started on MTX and prednisone with significant improvement. Started on Humira 1/2016 given persistent inflammatory arthritis. Has been in remission basically since that time. Started to titrate down on MTX from 8 to 6 tabs spring 2017 with no flare.     Last OV: 7/2017    Interim hx 1/8/2018:  Doing well today. No flares in joint pain. Very ocasionaly when stands up out of bed in morning will have pain in feet, but this resolves in seconds. Not needing to take any NSAIDs. L hand 3rd digit occasionally triggers. No joint swelling or morning stiffness. No paresthesias. Back to smoking 1 pack per week and this is distressing to her. Has some mild persistent stomach upset, no actual nausea/vomiting, abdominal pain, diarrhea, or constipation, but it  is something she notices. Briefly tried to increase folic acid around time she takes methotrexate but this didn't help.            Review of Systems:   Review Of Systems  Constitutional: denies f/s/c  Skin: No skin rash.  Eyes: No vision change, dry eyes  Ears/Nose/Throat: no oral/nasal ulcers  Respiratory: No SOB, cough  Cardiovascular: no cp, palpitations  Gastrointestinal: no change in BM  Neurologic: no numbness, tingling.   Endocrine: +hx Hashimotos             Past Medical History:     Past Medical History:   Diagnosis Date     Dysplasia of cervix 1990's    had cryotx     Hyperlipidemia LDL goal < 130 7/31/2014     Hypothyroidism      Menopause age 48     Nephrolithiasis 1/11    s/p ESWL     Rheumatoid arthritis (H) 6/15     Smoker      Ulcerative colitis (H) 1-06     Past Surgical History:   Procedure Laterality Date     COLONOSCOPY  3-14-14     CRYOTHERAPY, CERVICAL  EARLY 1990's     HC REMOVAL GALLBLADDER  2-01    and repair ventral hernia            Social History:     Social History     Occupational History      [employer]     Social History Main Topics     Smoking status: Former Smoker     Packs/day: 0.25     Types: Cigarettes     Quit date: 5/22/2015     Smokeless tobacco: Never Used      Comment: E Cig      Alcohol use Yes      Comment: occasional wine (twice per year)     Drug use: No     Sexual activity: Not Currently     Partners: Male   smoked for 30 years - quit spring 2017, restarted now smoking 1 pack per week  EtOH use - none  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          Family History:     Family History   Problem Relation Age of Onset     Breast Cancer Mother      Thyroid Disease Mother      DIABETES Maternal Grandmother      Hypertension Maternal Grandmother      CEREBROVASCULAR DISEASE Maternal Grandmother      CANCER Maternal Grandmother      Hypertension Paternal Grandmother      Breast Cancer Paternal Grandmother      CANCER Paternal Grandmother       Thyroid Disease Sister      Asthma Son             Allergies:     Allergies   Allergen Reactions     Codeine      Nausea and vomiting     Dye [Contrast Dye]             Medications:     Current Outpatient Prescriptions   Medication Sig Dispense Refill     methotrexate 2.5 MG tablet CHEMO Take 4 tablets (10 mg) by mouth once a week 16 tablet 3     folic acid (FOLVITE) 1 MG tablet Take 1 tab daily. Day prior to, day of, and day after MTX take 2 tabs. 102 tablet 3     adalimumab (HUMIRA PEN) 40 MG/0.8ML pen kit Inject 0.8 mLs (40 mg) Subcutaneous every 14 days 6 each 1     levothyroxine (SYNTHROID/LEVOTHROID) 112 MCG tablet Take 1 tablet (112 mcg) by mouth daily 90 tablet 3     ibuprofen (ADVIL,MOTRIN) 600 MG tablet Take 600 mg by mouth as needed Reported on 4/3/2017       [DISCONTINUED] methotrexate 2.5 MG tablet CHEMO Take 6 tablets (15 mg) by mouth once a week Take 6 tablets, 15 mg  of methotrexate once a week 72 tablet 0            Physical Exam:   /78  Pulse 65  Temp 97.7  F (36.5  C) (Oral)  Wt 65.6 kg (144 lb 9.6 oz)  SpO2 99%  BMI 25.3 kg/m2  144 lbs 9.6 oz     Constitutional: well-developed, appearing stated age; cooperative  Eyes: nl EOM, PERRLA, vision, conjunctiva, sclera  ENT: nl external ears, nose, hearing, lips, teeth, gums, throat  No mucous membrane lesions, normal saliva pool  Neck: no mass or thyroid enlargement  Resp: lungs clear to auscultation, nl to palpation  CV: RRR, no murmurs, rubs or gallops, no edema  GI: no ABD mass or tenderness, no HSM  Lymph: no cervical, supraclavicular, or epitrochlear nodes  MS: All TMJ, neck, shoulder, elbow, wrist, MCP/PIP/DIP, spine, hip, knee, ankle, and foot MTP/IP joints were examined.  - mild joint laxity of BL wrists   - L elbow w/ 5 deg contraction flexure w/o active synovitis (unchanged)  - L hand 3rd PIP slight contraction flexure 2/2 prior fracture. Mild laxity of 3rd MCP without synovitis.   Normal  strength. No dactylitis,   tenosynovitis, enthesopathy.  Skin: no nail pitting, alopecia, rash, lesions  Neuro: grossly nl cranial nerves, strength   Psych: nl judgement, orientation, memory, affect.         Data:     Results for orders placed or performed in visit on 12/29/17   Albumin level   Result Value Ref Range    Albumin 3.5 3.4 - 5.0 g/dL   ALT   Result Value Ref Range    ALT 26 0 - 50 U/L   AST   Result Value Ref Range    AST 18 0 - 45 U/L   CBC with platelets differential   Result Value Ref Range    WBC 7.4 4.0 - 11.0 10e9/L    RBC Count 3.74 (L) 3.8 - 5.2 10e12/L    Hemoglobin 12.4 11.7 - 15.7 g/dL    Hematocrit 36.1 35.0 - 47.0 %    MCV 97 78 - 100 fl    MCH 33.2 (H) 26.5 - 33.0 pg    MCHC 34.3 31.5 - 36.5 g/dL    RDW 12.7 10.0 - 15.0 %    Platelet Count 244 150 - 450 10e9/L    Diff Method Automated Method     % Neutrophils 61.6 %    % Lymphocytes 24.9 %    % Monocytes 9.6 %    % Eosinophils 3.0 %    % Basophils 0.9 %    Absolute Neutrophil 4.5 1.6 - 8.3 10e9/L    Absolute Lymphocytes 1.8 0.8 - 5.3 10e9/L    Absolute Monocytes 0.7 0.0 - 1.3 10e9/L    Absolute Eosinophils 0.2 0.0 - 0.7 10e9/L    Absolute Basophils 0.1 0.0 - 0.2 10e9/L   Creatinine   Result Value Ref Range    Creatinine 0.84 0.52 - 1.04 mg/dL    GFR Estimate 71 >60 mL/min/1.7m2    GFR Estimate If Black 86 >60 mL/min/1.7m2     Cyclic Cit Pept IgG/IgA   Date Value Ref Range Status   07/06/2015 205 (H) <20 UNITS Final     Comment:     Strongly Positive     Hemoglobin   Date Value Ref Range Status   12/29/2017 12.4 11.7 - 15.7 g/dL Final   10/11/2017 11.4 (L) 11.7 - 15.7 g/dL Final   06/30/2017 13.3 11.7 - 15.7 g/dL Final     Urea Nitrogen   Date Value Ref Range Status   05/28/2015 18 7 - 30 mg/dL Final     Sed Rate   Date Value Ref Range Status   10/11/2017 7 0 - 30 mm/h Final   06/30/2017 7 0 - 30 mm/h Final   03/21/2017 6 0 - 30 mm/h Final     C-Reactive Protein   Date Value Ref Range Status   02/20/2015 <2.0 0 - 0.8 mg/dL Final     CRP Inflammation   Date Value Ref  Range Status   10/11/2017 <2.9 0.0 - 8.0 mg/L Final   06/30/2017 <2.9 0.0 - 8.0 mg/L Final   03/21/2017 <2.9 0.0 - 8.0 mg/L Final     AST   Date Value Ref Range Status   12/29/2017 18 0 - 45 U/L Final   10/11/2017 11 0 - 45 U/L Final   06/30/2017 17 0 - 45 U/L Final     Albumin   Date Value Ref Range Status   12/29/2017 3.5 3.4 - 5.0 g/dL Final   08/22/2016 3.7 3.4 - 5.0 g/dL Final   05/19/2016 3.7 3.4 - 5.0 g/dL Final     Alkaline Phosphatase   Date Value Ref Range Status   05/28/2015 99 40 - 150 U/L Final     ALT   Date Value Ref Range Status   12/29/2017 26 0 - 50 U/L Final   10/11/2017 20 0 - 50 U/L Final   06/30/2017 24 0 - 50 U/L Final     Rheumatoid Factor   Date Value Ref Range Status   05/28/2015 49 (H) <20 IU/mL Final     Recent Labs   Lab Test  12/29/17   0756  10/11/17   0849  06/30/17   0845   12/12/16   0830   05/28/15   1631   WBC  7.4  5.6  6.2   < >   --    < >  7.2   HGB  12.4  11.4*  13.3   < >   --    < >  13.5   HCT  36.1  33.4*  38.1   < >   --    < >  39.8   MCV  97  97  96   < >   --    < >  92   PLT  244  228  241   < >   --    < >  282   BUN   --    --    --    --    --    --   18   TSH   --    --   0.86   --   1.31   --   4.09*   AST  18  11  17   < >   --    < >  16   ALT  26  20  24   < >   --    < >  20   ALKPHOS   --    --    --    --    --    --   99    < > = values in this interval not displayed.     BL hand xray 8/2016:  Impression:  1. No acute osseous abnormality.  2. No radiographic evidence of inflammatory arthritis.    Reviewed Rheumatology lab flow sheet

## 2018-01-19 ENCOUNTER — TELEPHONE (OUTPATIENT)
Dept: RHEUMATOLOGY | Facility: CLINIC | Age: 58
End: 2018-01-19

## 2018-01-19 DIAGNOSIS — Z79.899 ENCOUNTER FOR LONG-TERM (CURRENT) USE OF HIGH-RISK MEDICATION: ICD-10-CM

## 2018-01-19 DIAGNOSIS — M05.79 RHEUMATOID ARTHRITIS INVOLVING MULTIPLE SITES WITH POSITIVE RHEUMATOID FACTOR (H): ICD-10-CM

## 2018-01-19 DIAGNOSIS — M25.522 ELBOW PAIN, LEFT: ICD-10-CM

## 2018-01-19 NOTE — TELEPHONE ENCOUNTER
Received fax from [facility], pt must use [facility] for Humira now. Prescription sent for PA and then onto [facility].    Giovanna Deng RN  Rheumatology Clinic

## 2018-02-13 ENCOUNTER — TRANSFERRED RECORDS (OUTPATIENT)
Dept: HEALTH INFORMATION MANAGEMENT | Facility: CLINIC | Age: 58
End: 2018-02-13

## 2018-03-30 ENCOUNTER — TELEPHONE (OUTPATIENT)
Dept: DERMATOLOGY | Facility: CLINIC | Age: 58
End: 2018-03-30

## 2018-03-30 NOTE — TELEPHONE ENCOUNTER
Called Carolina to remind her of her appointment on 4/6/18. Informed them of parking and to arrive 15 minutes early.   Sanaz Awad MA

## 2018-04-06 ENCOUNTER — OFFICE VISIT (OUTPATIENT)
Dept: DERMATOLOGY | Facility: CLINIC | Age: 58
End: 2018-04-06
Payer: COMMERCIAL

## 2018-04-06 DIAGNOSIS — Z12.83 SKIN CANCER SCREENING: ICD-10-CM

## 2018-04-06 DIAGNOSIS — L82.1 SEBORRHEIC KERATOSES: Primary | ICD-10-CM

## 2018-04-06 DIAGNOSIS — L91.8 ACHROCHORDON: ICD-10-CM

## 2018-04-06 ASSESSMENT — PAIN SCALES - GENERAL: PAINLEVEL: NO PAIN (0)

## 2018-04-06 NOTE — NURSING NOTE
Dermatology Rooming Note    Carolina Mora's goals for this visit include:   Chief Complaint   Patient presents with     Skin Check     Carolina is here today for a sink check- some areas of concern.      Sanaz Awad MA

## 2018-04-06 NOTE — PROGRESS NOTES
[facility] Dermatology Note      Dermatology Problem List:  1.RA - taking MTX daily    CC:   Chief Complaint   Patient presents with     Skin Check     Carolina is here today for a sink check- some areas of concern.          Encounter Date: Apr 6, 2018    History of Present Illness:  Ms. Carolina Mora is a 57 year old female who is here for a FBSE. She has one specific spot on her back which is asx, but she did notice it being new. The patient denies painful, itching, tingling or bleeding lesions unless otherwise noted. Patient burns easily. She does wear sunscreen, but endorses several sunburns as a child. Now she is meticulous about wearing sunscreen she says, usually SPF 50. Has a sister who has had BCCs in the past.     She has a hx of MTX use due to rheumatoid arthritis. She has been on MTX just over two years.     Past Medical History:   Patient Active Problem List   Diagnosis     Ulcerative colitis (H)     History of nephrolithiasis     Hyperlipidemia with target LDL less than 130     Ventral hernia without obstruction or gangrene     Rheumatoid arthritis involving multiple sites with positive rheumatoid factor (H)     Encounter for long-term (current) use of high-risk medication     Hypothyroidism due to acquired atrophy of thyroid     Past Medical History:   Diagnosis Date     Dysplasia of cervix 1990's    had cryotx     Hyperlipidemia LDL goal < 130 7/31/2014     Hypothyroidism      Menopause age 48     Nephrolithiasis 1/11    s/p ESWL     Rheumatoid arthritis (H) 6/15     Smoker      Ulcerative colitis (H) 1-06     Past Surgical History:   Procedure Laterality Date     COLONOSCOPY  3-14-14     CRYOTHERAPY, CERVICAL  EARLY 1990's     HC REMOVAL GALLBLADDER  2-01    and repair ventral hernia       Social History:      Family History:  There is a family history of non-melanoma skin cancer, BCC, in the patient's sister.  No family hx of melanoma    Medications:  Current Outpatient  Prescriptions   Medication Sig Dispense Refill     adalimumab (HUMIRA PEN) 40 MG/0.8ML pen kit Inject 0.8 mLs (40 mg) Subcutaneous every 14 days 6 each 1     methotrexate 2.5 MG tablet CHEMO Take 4 tablets (10 mg) by mouth once a week 16 tablet 3     folic acid (FOLVITE) 1 MG tablet Take 1 tab daily. Day prior to, day of, and day after MTX take 2 tabs. 102 tablet 3     levothyroxine (SYNTHROID/LEVOTHROID) 112 MCG tablet Take 1 tablet (112 mcg) by mouth daily 90 tablet 3     ibuprofen (ADVIL,MOTRIN) 600 MG tablet Take 600 mg by mouth as needed Reported on 4/3/2017       Allergies   Allergen Reactions     Codeine      Nausea and vomiting     Dye [Contrast Dye]          Review of Systems:  -Skin/Heme New Pt: The patient admits to frequent sun exposure. The patient denies excessive scarring or problems healing except as per HPI. The patient denies excessive bleeding.  -Constitutional: The patient denies fatigue, fevers, chills, unintended weight loss, and night sweats.  -Skin: As above in HPI. No additional skin concerns.    Physical exam:  Vitals: There were no vitals taken for this visit.  GEN: This is a well developed, well-nourished female in no acute distress, in a pleasant mood.    SKIN: Full skin, which includes the head/face, both arms, chest, back, abdomen,both legs, genitalia and/or groin buttocks, digits and/or nails, was examined.  -4-5 waxy, suck on papules on the mid back  -Sage's skin type I - patient has very few, almost no nevi  -2-3 skin tags on the right lateral neck  -No other lesions of concern on areas examined.     Impression/Plan:  1. Seborrheic keratosis, non irritated - mid back    No further intervention required. Patient to report changes. , Sun precaution was advised including the use of sun screens of SPF 30 or higher, sun protective clothing, and avoidance of tanning beds., We will clinically monitor this area.    Discussed benign nature, no further intervention required at this  time.    2. Skin Cancer Screening    ABCDs of melanoma were discussed and self skin checks were advised. , Sun precaution was advised including the use of sun screens of SPF 30 or higher, sun protective clothing, and avoidance of tanning beds., We will clinically monitor this area.    3. Acrochordon - right lateral neck    Discussed benign nature, no further intervention required at this time.     CC Dr. Kumar on close of this encounter.  Follow-up in 1 year, earlier for new or changing lesions.       Staff Involved:  Staff Only  All risks, benefits and alternatives were discussed with patient.  Patient is in agreement and understands the assessment and plan.  All questions were answered.    Jessica Arellano PA-C  [facility]: Phone: [phone], Fax: [phone]

## 2018-04-06 NOTE — MR AVS SNAPSHOT
After Visit Summary   4/6/2018    Carolina Mora    MRN: [id number]           Patient Information     Date Of Birth          1960        Visit Information        Provider Department      4/6/2018 10:00 AM Jessica Arellano PA-C [facility] Dermatology        Care Instructions    The ABCDEs of Melanoma    Skin cancer can develop anywhere on the skin. Ask someone for help when checking your skin, especially in hard to see places. If you notice a mole different from others, or that changes, enlarges, itches, or bleeds (even if it is small), you should see a dermatologist.                  Follow-ups after your visit        Your next 10 appointments already scheduled     Jul 09, 2018  8:00 AM CDT   (Arrive by 7:45 AM)   Return Visit with Ann Marie Guajardo MD   [facility] Rheumatology ([facility])    [address] [place] [address]  [address]  [facility] [address] [phone]              Who to contact     Please call your clinic at [phone] to:    Ask questions about your health    Make or cancel appointments    Discuss your medicines    Learn about your test results    Speak to your doctor            Additional Information About Your Visit        MyChart Information     [employer] gives you secure access to your electronic health record. If you see a primary care provider, you can also send messages to your care team and make appointments. If you have questions, please call your primary care clinic.  If you do not have a primary care provider, please call [phone] and they will assist you.      [employer] is an electronic gateway that provides easy, online access to your medical records. With [employer], you can request a clinic appointment, read your test results, renew a prescription or communicate with your care team.     To access your existing account, please contact your [facility] or call [phone] for assistance.        Care EveryWhere ID      This is your Care EveryWhere ID. This could be used by other organizations to access your [facility] medical records  [id number]         Blood Pressure from Last 3 Encounters:   01/08/18 133/78   07/10/17 125/83   06/30/17 124/71    Weight from Last 3 Encounters:   01/08/18 65.6 kg (144 lb 9.6 oz)   07/10/17 64.8 kg (142 lb 14.4 oz)   06/30/17 64.4 kg (142 lb)              Today, you had the following     No orders found for display       Primary Care Provider Office Phone # Fax #    Michael Beltrán [phone]       [address] [facility] [address]        Equal Access to Services     TOMAS MAE : Hadii amparo nunezo Sozee, waaxda luqadaha, qaybta kaalmada adeegyada, gregorio mallory . So [facility] [phone].    ATENCIÓN: Si habla español, tiene a oneill disposición servicios gratuitos de asistencia lingüística. [facility] [phone].    We comply with applicable federal civil rights laws and [place] laws. We do not discriminate on the basis of race, color, national origin, age, disability, sex, sexual orientation, or gender identity.            Thank you!     Thank you for choosing [facility]  for your care. Our goal is always to provide you with excellent care. Hearing back from our patients is one way we can continue to improve our services. Please take a few minutes to complete the written survey that you may receive in the mail after your visit with us. Thank you!             Your Updated Medication List - Protect others around you: Learn how to safely use, store and throw away your medicines at www.disposemymeds.org.          This list is accurate as of 4/6/18 10:22 AM.  Always use your most recent med list.                   Brand Name Dispense Instructions for use Diagnosis    adalimumab 40 MG/0.8ML pen kit    HUMIRA PEN    6 each    Inject 0.8 mLs (40 mg) Subcutaneous every 14 days    Encounter for long-term (current) use of high-risk  medication, Elbow pain, left, Rheumatoid arthritis involving multiple sites with positive rheumatoid factor (H)       folic acid 1 MG tablet    FOLVITE    102 tablet    Take 1 tab daily. Day prior to, day of, and day after MTX take 2 tabs.    Encounter for long-term (current) use of high-risk medication, Rheumatoid arthritis involving multiple sites with positive rheumatoid factor (H), Elbow pain, left       ibuprofen 600 MG tablet    ADVIL/MOTRIN     Take 600 mg by mouth as needed Reported on 4/3/2017        levothyroxine 112 MCG tablet    SYNTHROID/LEVOTHROID    90 tablet    Take 1 tablet (112 mcg) by mouth daily    Hypothyroidism due to acquired atrophy of thyroid       methotrexate 2.5 MG tablet CHEMO     16 tablet    Take 4 tablets (10 mg) by mouth once a week    Rheumatoid arthritis involving multiple sites with positive rheumatoid factor (H), Encounter for long-term (current) use of high-risk medication, Elbow pain, left

## 2018-04-06 NOTE — LETTER
4/6/2018       RE: Carolina Mora  [address]  [facility] [id number]     Dear Colleague,    Thank you for referring your patient, Carolina Mora, to the [facility] DERMATOLOGY at [facility]. Please see a copy of my visit note below.    [facility] Dermatology Note      Dermatology Problem List:  1.RA - taking MTX daily    CC:   Chief Complaint   Patient presents with     Skin Check     Carolina is here today for a sink check- some areas of concern.          Encounter Date: Apr 6, 2018    History of Present Illness:  Ms. Carolina Mora is a 57 year old female who is here for a FBSE. She has one specific spot on her back which is asx, but she did notice it being new. The patient denies painful, itching, tingling or bleeding lesions unless otherwise noted. Patient burns easily. She does wear sunscreen, but endorses several sunburns as a child. Now she is meticulous about wearing sunscreen she says, usually SPF 50. Has a sister who has had BCCs in the past.     She has a hx of MTX use due to rheumatoid arthritis. She has been on MTX just over two years.     Past Medical History:   Patient Active Problem List   Diagnosis     Ulcerative colitis (H)     History of nephrolithiasis     Hyperlipidemia with target LDL less than 130     Ventral hernia without obstruction or gangrene     Rheumatoid arthritis involving multiple sites with positive rheumatoid factor (H)     Encounter for long-term (current) use of high-risk medication     Hypothyroidism due to acquired atrophy of thyroid     Past Medical History:   Diagnosis Date     Dysplasia of cervix 1990's    had cryotx     Hyperlipidemia LDL goal < 130 7/31/2014     Hypothyroidism      Menopause age 48     Nephrolithiasis 1/11    s/p ESWL     Rheumatoid arthritis (H) 6/15     Smoker      Ulcerative colitis (H) 1-06     Past Surgical History:   Procedure Laterality Date     COLONOSCOPY  3-14-14     CRYOTHERAPY,  CERVICAL  EARLY 1990's     HC REMOVAL GALLBLADDER  2-01    and repair ventral hernia       Social History:      Family History:  There is a family history of non-melanoma skin cancer, BCC, in the patient's sister.  No family hx of melanoma    Medications:  Current Outpatient Prescriptions   Medication Sig Dispense Refill     adalimumab (HUMIRA PEN) 40 MG/0.8ML pen kit Inject 0.8 mLs (40 mg) Subcutaneous every 14 days 6 each 1     methotrexate 2.5 MG tablet CHEMO Take 4 tablets (10 mg) by mouth once a week 16 tablet 3     folic acid (FOLVITE) 1 MG tablet Take 1 tab daily. Day prior to, day of, and day after MTX take 2 tabs. 102 tablet 3     levothyroxine (SYNTHROID/LEVOTHROID) 112 MCG tablet Take 1 tablet (112 mcg) by mouth daily 90 tablet 3     ibuprofen (ADVIL,MOTRIN) 600 MG tablet Take 600 mg by mouth as needed Reported on 4/3/2017       Allergies   Allergen Reactions     Codeine      Nausea and vomiting     Dye [Contrast Dye]          Review of Systems:  -Skin/Heme New Pt: The patient admits to frequent sun exposure. The patient denies excessive scarring or problems healing except as per HPI. The patient denies excessive bleeding.  -Constitutional: The patient denies fatigue, fevers, chills, unintended weight loss, and night sweats.  -Skin: As above in HPI. No additional skin concerns.    Physical exam:  Vitals: There were no vitals taken for this visit.  GEN: This is a well developed, well-nourished female in no acute distress, in a pleasant mood.    SKIN: Full skin, which includes the head/face, both arms, chest, back, abdomen,both legs, genitalia and/or groin buttocks, digits and/or nails, was examined.  -4-5 waxy, suck on papules on the mid back  -Sage's skin type I - patient has very few, almost no nevi  -2-3 skin tags on the right lateral neck  -No other lesions of concern on areas examined.     Impression/Plan:  1. Seborrheic keratosis, non irritated - mid back    No further intervention  required. Patient to report changes. , Sun precaution was advised including the use of sun screens of SPF 30 or higher, sun protective clothing, and avoidance of tanning beds., We will clinically monitor this area.    Discussed benign nature, no further intervention required at this time.    2. Skin Cancer Screening    ABCDs of melanoma were discussed and self skin checks were advised. , Sun precaution was advised including the use of sun screens of SPF 30 or higher, sun protective clothing, and avoidance of tanning beds., We will clinically monitor this area.    3. Acrochordon - right lateral neck    Discussed benign nature, no further intervention required at this time.     CC Dr. Kumar on close of this encounter.  Follow-up in 1 year, earlier for new or changing lesions.       Staff Involved:  Staff Only  All risks, benefits and alternatives were discussed with patient.  Patient is in agreement and understands the assessment and plan.  All questions were answered.    Jessica Arellano PA-C  [facility]: Phone: [phone], Fax: [phone]

## 2018-04-15 ENCOUNTER — HEALTH MAINTENANCE LETTER (OUTPATIENT)
Age: 58
End: 2018-04-15

## 2018-06-16 DIAGNOSIS — M25.522 ELBOW PAIN, LEFT: ICD-10-CM

## 2018-06-16 DIAGNOSIS — M05.79 RHEUMATOID ARTHRITIS INVOLVING MULTIPLE SITES WITH POSITIVE RHEUMATOID FACTOR (H): ICD-10-CM

## 2018-06-16 DIAGNOSIS — Z79.899 ENCOUNTER FOR LONG-TERM (CURRENT) USE OF HIGH-RISK MEDICATION: ICD-10-CM

## 2018-06-18 NOTE — TELEPHONE ENCOUNTER
methotrexate 2.5 MG tablet CHEMO      Last Written Prescription Date:  1/8/18  Last Fill Quantity: 16,   # refills: 3  Last Office Visit: 1/8/18  *Dr Guajardo  Future Office visit:  6/28/18  *Dr Fields    CBC RESULTS:   Recent Labs   Lab Test  12/29/17   0756   WBC  7.4   RBC  3.74*   HGB  12.4   HCT  36.1   MCV  97   MCH  33.2*   MCHC  34.3   RDW  12.7   PLT  244       Creatinine   Date Value Ref Range Status   12/29/2017 0.84 0.52 - 1.04 mg/dL Final   ]    Liver Function Studies -   Recent Labs   Lab Test  12/29/17   0756   05/28/15   1631   PROTTOTAL   --    --   7.8   ALBUMIN  3.5   < >  3.5   BILITOTAL   --    --   0.7   ALKPHOS   --    --   99   AST  18   < >  16   ALT  26   < >  20    < > = values in this interval not displayed.       Routing refill request to provider for review/approval because:  DMARD medication- does not requirements.  Labs past due.  Former patient Dr Guajardo- appt with Dr Fields 6/28/18

## 2018-06-20 NOTE — TELEPHONE ENCOUNTER
Pt has labs scheduled for 06/22. I called pt & advised of 30 day MTX refill. Pt states she needs refill for folic acid. She is out as of today.    Astrid Nunez LPN

## 2018-06-22 DIAGNOSIS — Z79.899 ENCOUNTER FOR LONG-TERM (CURRENT) USE OF HIGH-RISK MEDICATION: ICD-10-CM

## 2018-06-22 DIAGNOSIS — M05.79 RHEUMATOID ARTHRITIS INVOLVING MULTIPLE SITES WITH POSITIVE RHEUMATOID FACTOR (H): ICD-10-CM

## 2018-06-22 LAB
ALBUMIN SERPL-MCNC: 3.5 G/DL (ref 3.4–5)
ALT SERPL W P-5'-P-CCNC: 18 U/L (ref 0–50)
AST SERPL W P-5'-P-CCNC: 17 U/L (ref 0–45)
BASOPHILS # BLD AUTO: 0.1 10E9/L (ref 0–0.2)
BASOPHILS NFR BLD AUTO: 1.5 %
CREAT SERPL-MCNC: 0.81 MG/DL (ref 0.52–1.04)
DIFFERENTIAL METHOD BLD: ABNORMAL
EOSINOPHIL # BLD AUTO: 0.2 10E9/L (ref 0–0.7)
EOSINOPHIL NFR BLD AUTO: 3.2 %
ERYTHROCYTE [DISTWIDTH] IN BLOOD BY AUTOMATED COUNT: 12.8 % (ref 10–15)
GFR SERPL CREATININE-BSD FRML MDRD: 74 ML/MIN/1.7M2
HCT VFR BLD AUTO: 37.1 % (ref 35–47)
HGB BLD-MCNC: 12.7 G/DL (ref 11.7–15.7)
LYMPHOCYTES # BLD AUTO: 2.2 10E9/L (ref 0.8–5.3)
LYMPHOCYTES NFR BLD AUTO: 32.5 %
MCH RBC QN AUTO: 33.1 PG (ref 26.5–33)
MCHC RBC AUTO-ENTMCNC: 34.2 G/DL (ref 31.5–36.5)
MCV RBC AUTO: 97 FL (ref 78–100)
MONOCYTES # BLD AUTO: 0.8 10E9/L (ref 0–1.3)
MONOCYTES NFR BLD AUTO: 11.4 %
NEUTROPHILS # BLD AUTO: 3.4 10E9/L (ref 1.6–8.3)
NEUTROPHILS NFR BLD AUTO: 51.4 %
PLATELET # BLD AUTO: 269 10E9/L (ref 150–450)
RBC # BLD AUTO: 3.84 10E12/L (ref 3.8–5.2)
WBC # BLD AUTO: 6.6 10E9/L (ref 4–11)

## 2018-06-22 PROCEDURE — 36415 COLL VENOUS BLD VENIPUNCTURE: CPT | Performed by: INTERNAL MEDICINE

## 2018-06-22 PROCEDURE — 84450 TRANSFERASE (AST) (SGOT): CPT | Performed by: INTERNAL MEDICINE

## 2018-06-22 PROCEDURE — 82040 ASSAY OF SERUM ALBUMIN: CPT | Performed by: INTERNAL MEDICINE

## 2018-06-22 PROCEDURE — 82565 ASSAY OF CREATININE: CPT | Performed by: INTERNAL MEDICINE

## 2018-06-22 PROCEDURE — 84460 ALANINE AMINO (ALT) (SGPT): CPT | Performed by: INTERNAL MEDICINE

## 2018-06-22 PROCEDURE — 85025 COMPLETE CBC W/AUTO DIFF WBC: CPT | Performed by: INTERNAL MEDICINE

## 2018-06-28 ENCOUNTER — OFFICE VISIT (OUTPATIENT)
Dept: RHEUMATOLOGY | Facility: CLINIC | Age: 58
End: 2018-06-28
Attending: INTERNAL MEDICINE
Payer: COMMERCIAL

## 2018-06-28 VITALS
WEIGHT: 141.9 LBS | SYSTOLIC BLOOD PRESSURE: 158 MMHG | HEART RATE: 64 BPM | BODY MASS INDEX: 24.83 KG/M2 | OXYGEN SATURATION: 99 % | DIASTOLIC BLOOD PRESSURE: 89 MMHG | TEMPERATURE: 97.7 F

## 2018-06-28 DIAGNOSIS — M25.522 ELBOW PAIN, LEFT: ICD-10-CM

## 2018-06-28 DIAGNOSIS — Z79.899 ENCOUNTER FOR LONG-TERM (CURRENT) USE OF HIGH-RISK MEDICATION: ICD-10-CM

## 2018-06-28 DIAGNOSIS — M05.79 RHEUMATOID ARTHRITIS INVOLVING MULTIPLE SITES WITH POSITIVE RHEUMATOID FACTOR (H): ICD-10-CM

## 2018-06-28 PROCEDURE — G0463 HOSPITAL OUTPT CLINIC VISIT: HCPCS | Mod: ZF

## 2018-06-28 RX ORDER — FOLIC ACID 1 MG/1
TABLET ORAL
Qty: 180 TABLET | Refills: 3 | Status: SHIPPED | OUTPATIENT
Start: 2018-06-28 | End: 2019-03-31

## 2018-06-28 ASSESSMENT — PAIN SCALES - GENERAL: PAINLEVEL: NO PAIN (0)

## 2018-06-28 NOTE — PROGRESS NOTES
Rheumatology Visit     Carolina Mora MRN# [id number]   YOB: 1960 Age: 57 year old     Date of Visit: June 28, 2018  Primary care provider: Michael Beltrán          Assessment and Plan:     # Seropositive RA, non-erosive (RF 49/ACPA 205), dx 7/2015. Reportedly prior rheumatoid nodules (BL elbow, R hand, R foot - resolved):    RA remains in remission with no disease activity by history or on exam today.  Laboratory shows normal LFTs, CBC, and creatinine on June 22, 2018.  Because she has had 2 years of symptom control, I think reducing mtx is again in order. Will continue with slow de-escalation of therapy and have her decrease MTX from 10 to 7.5 mg weekly. We discussed the potential for reducing immunomodualtory medication to find minimum effective doses of drugs, including humira.  We also discussed the possibility of induction of long-lasting remissions with combination methotrexate and anti-TNF therapy.  I described the potential for reducing the frequency of Humira injections, should she continue to experience complete suppression of joint pain and swelling in another 6 months.    # UC, in remission - dx >10y ago during her 2nd pregnancy, presented w/ hematochezia. Colonoscopy 2/2016 normal, repeat due 2/2018  # Hashimoto's thyroiditis    Plan:  - continue Humira 40 mg SQ q2 weeks  - decrease MTX to 3 tabs weekly.  - increase folic acid to 2 mg daily   - labs q3 months  - continue working on smoking cessation  - if trigger finger worsens but no other signs active RA, could inject     RTC in 6 mo    Health maintenance  HBV: negative 7/2015  HCV: negative 7/2015  Quant gold: negative 12/2015  HIV: never tested per chart review - recommend checking at least 1x  Cancer screenings: colonoscopy 2/2016 normal, mammo 6/2016 normal  Immunizations: UTD on [id number], PPSV23, flu  Bone health: DEXA spring 2017 normal    Augustine Fields M.D.  Staff Rheumatologist, [facility]  Pager [phone]          Active  Problem List:     Patient Active Problem List    Diagnosis Date Noted     Hypothyroidism due to acquired atrophy of thyroid 06/01/2016     Priority: Medium     Rheumatoid arthritis involving multiple sites with positive rheumatoid factor (H) 02/29/2016     Priority: Medium     Encounter for long-term (current) use of high-risk medication 02/29/2016     Priority: Medium     Hyperlipidemia with target LDL less than 130 07/31/2014     Priority: Medium     Diagnosis updated by automated process. Provider to review and confirm.       Ventral hernia without obstruction or gangrene 07/31/2014     Priority: Medium     History of nephrolithiasis 01/04/2013     Priority: Medium     Ulcerative colitis (H)      Priority: Medium            History of Present Illness:     Rheumatological history:  Pt initially referred to rheumatology 7/2015 for 6m history of polyarticular inflammatory arthritis and hand paresthesias. Found to have +RF/CCP and started on MTX and prednisone with significant improvement. Started on Humira 1/2016 given persistent inflammatory arthritis. Has been in remission since that time. Started to titrate down on MTX from 8 to 6 tabs spring 2017 with no flare.     Last OV: 1-18    Interim hx 1/8/2018:  Doing well today. No flares in joint pain. Very ocasionaly when stands up out of bed in morning will have pain in feet, but this resolves in seconds. Not needing to take any NSAIDs. L hand 3rd digit occasionally triggers. No joint swelling or morning stiffness. No paresthesias. Back to smoking 1 pack per week and this is distressing to her. Has some mild persistent stomach upset, no actual nausea/vomiting, abdominal pain, diarrhea, or constipation, but it is something she notices. Briefly tried to increase folic acid around time she takes methotrexate but this didn't help.    Interim 6-18:  Doing great. No joint pain. No morning stiffness. No eye, skin, or bowel symptoms. Active in her garden, using her hands a  lot. No medication for inflammation needed.  Still smoking 1 pack per week.       Review of Systems:   Review Of Systems  Constitutional: denies f/s/c  Skin: No skin rash.  Eyes: No vision change, dry eyes  Ears/Nose/Throat: once  Respiratory: No SOB, cough  Cardiovascular: no cp, palpitations  Gastrointestinal: no change in BM  Neurologic: no numbness, tingling.   Endocrine: +hx Hashimotos             Past Medical History:     Past Medical History:   Diagnosis Date     Dysplasia of cervix 1990's    had cryotx     Hyperlipidemia LDL goal < 130 7/31/2014     Hypothyroidism      Menopause age 48     Nephrolithiasis 1/11    s/p ESWL     Rheumatoid arthritis (H) 6/15     Smoker      Ulcerative colitis (H) 1-06     Past Surgical History:   Procedure Laterality Date     COLONOSCOPY  3-14-14     CRYOTHERAPY, CERVICAL  EARLY 1990's     HC REMOVAL GALLBLADDER  2-01    and repair ventral hernia            Social History:     Social History     Occupational History      [employer]     Social History Main Topics     Smoking status: Former Smoker     Packs/day: 0.25     Types: Cigarettes     Quit date: 5/22/2015     Smokeless tobacco: Never Used      Comment: E Cig      Alcohol use Yes      Comment: occasional wine (twice per year)     Drug use: No     Sexual activity: Not Currently     Partners: Male   smoked for 30 years - quit spring 2017, restarted smoking 1 pack per week  EtOH use - none  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          Family History:     Family History   Problem Relation Age of Onset     Breast Cancer Mother      Thyroid Disease Mother      Diabetes Maternal Grandmother      Hypertension Maternal Grandmother      Cerebrovascular Disease Maternal Grandmother      Cancer Maternal Grandmother      Hypertension Paternal Grandmother      Breast Cancer Paternal Grandmother      Cancer Paternal Grandmother      Thyroid Disease Sister      Skin Cancer Sister      Asthma Son       Melanoma No family hx of             Allergies:     Allergies   Allergen Reactions     Codeine      Nausea and vomiting     Dye [Contrast Dye]             Medications:     Current Outpatient Prescriptions   Medication Sig Dispense Refill     adalimumab (HUMIRA PEN) 40 MG/0.8ML pen kit Inject 0.8 mLs (40 mg) Subcutaneous every 14 days 6 each 1     folic acid (FOLVITE) 1 MG tablet Take 1 tab daily. Day prior to, day of, and day after MTX take 2 tabs. 102 tablet 3     ibuprofen (ADVIL,MOTRIN) 600 MG tablet Take 600 mg by mouth as needed Reported on 4/3/2017       levothyroxine (SYNTHROID/LEVOTHROID) 112 MCG tablet Take 1 tablet (112 mcg) by mouth daily 90 tablet 3     methotrexate 2.5 MG tablet CHEMO Take 4 tablets (10 mg) by mouth once a week 16 tablet 0            Physical Exam:   /89 (BP Location: Right arm)  Pulse 64  Temp 97.7  F (36.5  C) (Oral)  Wt 64.4 kg (141 lb 14.4 oz)  SpO2 99%  BMI 24.83 kg/m2  141 lbs 14.4 oz     Constitutional: well-developed, appearing stated age; cooperative  Eyes: nl EOM, PERRLA, vision, conjunctiva, sclera  ENT: nl external ears, nose, hearing, lips, teeth, gums, throat  No mucous membrane lesions, normal saliva pool  Neck: no mass or thyroid enlargement  Resp: lungs clear to auscultation, nl to palpation  CV: RRR, no murmurs, rubs or gallops, no edema  GI: no ABD mass or tenderness, no HSM  Lymph: no cervical, supraclavicular, or epitrochlear nodes  MS: All TMJ, neck, shoulder, elbow, wrist, MCP/PIP/DIP, spine, hip, knee, ankle, and foot MTP/IP joints were examined.  - mild joint laxity of BL wrists   - L elbow w/ 5 deg contraction flexure w/o active synovitis (unchanged)  - L hand 3rd PIP slight contraction flexure 2/2 prior fracture. Mild laxity of 3rd MCP without synovitis.   Normal  strength. No dactylitis,  tenosynovitis, enthesopathy.  Skin: no nail pitting, alopecia, rash, lesions  Neuro: grossly nl cranial nerves, strength   Psych: nl judgement, orientation,  memory, affect.         Data:     Results for orders placed or performed in visit on 06/22/18   CBC with platelets differential   Result Value Ref Range    WBC 6.6 4.0 - 11.0 10e9/L    RBC Count 3.84 3.8 - 5.2 10e12/L    Hemoglobin 12.7 11.7 - 15.7 g/dL    Hematocrit 37.1 35.0 - 47.0 %    MCV 97 78 - 100 fl    MCH 33.1 (H) 26.5 - 33.0 pg    MCHC 34.2 31.5 - 36.5 g/dL    RDW 12.8 10.0 - 15.0 %    Platelet Count 269 150 - 450 10e9/L    Diff Method Automated Method     % Neutrophils 51.4 %    % Lymphocytes 32.5 %    % Monocytes 11.4 %    % Eosinophils 3.2 %    % Basophils 1.5 %    Absolute Neutrophil 3.4 1.6 - 8.3 10e9/L    Absolute Lymphocytes 2.2 0.8 - 5.3 10e9/L    Absolute Monocytes 0.8 0.0 - 1.3 10e9/L    Absolute Eosinophils 0.2 0.0 - 0.7 10e9/L    Absolute Basophils 0.1 0.0 - 0.2 10e9/L   ALT   Result Value Ref Range    ALT 18 0 - 50 U/L   AST   Result Value Ref Range    AST 17 0 - 45 U/L   Albumin level   Result Value Ref Range    Albumin 3.5 3.4 - 5.0 g/dL   Creatinine   Result Value Ref Range    Creatinine 0.81 0.52 - 1.04 mg/dL    GFR Estimate 74 >60 mL/min/1.7m2    GFR Estimate If Black 89 >60 mL/min/1.7m2     Cyclic Cit Pept IgG/IgA   Date Value Ref Range Status   07/06/2015 205 (H) <20 UNITS Final     Comment:     Strongly Positive     Hemoglobin   Date Value Ref Range Status   06/22/2018 12.7 11.7 - 15.7 g/dL Final   12/29/2017 12.4 11.7 - 15.7 g/dL Final   10/11/2017 11.4 (L) 11.7 - 15.7 g/dL Final     Urea Nitrogen   Date Value Ref Range Status   05/28/2015 18 7 - 30 mg/dL Final     Sed Rate   Date Value Ref Range Status   10/11/2017 7 0 - 30 mm/h Final   06/30/2017 7 0 - 30 mm/h Final   03/21/2017 6 0 - 30 mm/h Final     C-Reactive Protein   Date Value Ref Range Status   02/20/2015 <2.0 0 - 0.8 mg/dL Final     CRP Inflammation   Date Value Ref Range Status   10/11/2017 <2.9 0.0 - 8.0 mg/L Final   06/30/2017 <2.9 0.0 - 8.0 mg/L Final   03/21/2017 <2.9 0.0 - 8.0 mg/L Final     AST   Date Value Ref Range  Status   06/22/2018 17 0 - 45 U/L Final   12/29/2017 18 0 - 45 U/L Final   10/11/2017 11 0 - 45 U/L Final     Albumin   Date Value Ref Range Status   06/22/2018 3.5 3.4 - 5.0 g/dL Final   12/29/2017 3.5 3.4 - 5.0 g/dL Final   08/22/2016 3.7 3.4 - 5.0 g/dL Final     Alkaline Phosphatase   Date Value Ref Range Status   05/28/2015 99 40 - 150 U/L Final     ALT   Date Value Ref Range Status   06/22/2018 18 0 - 50 U/L Final   12/29/2017 26 0 - 50 U/L Final   10/11/2017 20 0 - 50 U/L Final     Rheumatoid Factor   Date Value Ref Range Status   05/28/2015 49 (H) <20 IU/mL Final     Recent Labs   Lab Test  06/22/18   0733  12/29/17   0756  10/11/17   0849  06/30/17   0845   12/12/16   0830   05/28/15   1631   WBC  6.6  7.4  5.6  6.2   < >   --    < >  7.2   HGB  12.7  12.4  11.4*  13.3   < >   --    < >  13.5   HCT  37.1  36.1  33.4*  38.1   < >   --    < >  39.8   MCV  97  97  97  96   < >   --    < >  92   PLT  269  244  228  241   < >   --    < >  282   BUN   --    --    --    --    --    --    --   18   TSH   --    --    --   0.86   --   1.31   --   4.09*   AST  17  18  11  17   < >   --    < >  16   ALT  18  26  20  24   < >   --    < >  20   ALKPHOS   --    --    --    --    --    --    --   99    < > = values in this interval not displayed.     BL hand xray 8/2016:  Impression:  1. No acute osseous abnormality.  2. No radiographic evidence of inflammatory arthritis.    Reviewed Rheumatology lab flow sheet

## 2018-06-28 NOTE — NURSING NOTE
Chief Complaint   Patient presents with     RECHECK     6 month follow up rheumatoid arthritis     /89 (BP Location: Right arm)  Pulse 64  Temp 97.7  F (36.5  C) (Oral)  Wt 64.4 kg (141 lb 14.4 oz)  SpO2 99%  BMI 24.83 kg/m2   Aneta Patel, CMA

## 2018-06-28 NOTE — LETTER
6/28/2018      RE: Carolina Mora  [address]  [facility] [address]       Rheumatology Visit     Carolina Mora MRN# [id number]   YOB: 1960 Age: 57 year old     Date of Visit: June 28, 2018  Primary care provider: Michael Beltrán          Assessment and Plan:     # Seropositive RA, non-erosive (RF 49/ACPA 205), dx 7/2015. Reportedly prior rheumatoid nodules (BL elbow, R hand, R foot - resolved):    RA remains in remission with no disease activity by history or on exam today.  Laboratory shows normal LFTs, CBC, and creatinine on June 22, 2018.  Because she has had 2 years of symptom control, I think reducing mtx is again in order. Will continue with slow de-escalation of therapy and have her decrease MTX from 10 to 7.5 mg weekly. We discussed the potential for reducing immunomodualtory medication to find minimum effective doses of drugs, including humira.  We also discussed the possibility of induction of long-lasting remissions with combination methotrexate and anti-TNF therapy.  I described the potential for reducing the frequency of Humira injections, should she continue to experience complete suppression of joint pain and swelling in another 6 months.    # UC, in remission - dx >10y ago during her 2nd pregnancy, presented w/ hematochezia. Colonoscopy 2/2016 normal, repeat due 2/2018  # Hashimoto's thyroiditis    Plan:  - continue Humira 40 mg SQ q2 weeks  - decrease MTX to 3 tabs weekly.  - increase folic acid to 2 mg daily   - labs q3 months  - continue working on smoking cessation  - if trigger finger worsens but no other signs active RA, could inject     RTC in 6 mo    Health maintenance  HBV: negative 7/2015  HCV: negative 7/2015  Quant gold: negative 12/2015  HIV: never tested per chart review - recommend checking at least 1x  Cancer screenings: colonoscopy 2/2016 normal, mammo 6/2016 normal  Immunizations: UTD on [id number], PPSV23, flu  Bone health: DEXA spring 2017 normal    Augustine  FLAQUITA Fields.  Staff Rheumatologist,  Health  Pager [phone]          Active Problem List:     Patient Active Problem List    Diagnosis Date Noted     Hypothyroidism due to acquired atrophy of thyroid 06/01/2016     Priority: Medium     Rheumatoid arthritis involving multiple sites with positive rheumatoid factor (H) 02/29/2016     Priority: Medium     Encounter for long-term (current) use of high-risk medication 02/29/2016     Priority: Medium     Hyperlipidemia with target LDL less than 130 07/31/2014     Priority: Medium     Diagnosis updated by automated process. Provider to review and confirm.       Ventral hernia without obstruction or gangrene 07/31/2014     Priority: Medium     History of nephrolithiasis 01/04/2013     Priority: Medium     Ulcerative colitis (H)      Priority: Medium            History of Present Illness:     Rheumatological history:  Pt initially referred to rheumatology 7/2015 for 6m history of polyarticular inflammatory arthritis and hand paresthesias. Found to have +RF/CCP and started on MTX and prednisone with significant improvement. Started on Humira 1/2016 given persistent inflammatory arthritis. Has been in remission since that time. Started to titrate down on MTX from 8 to 6 tabs spring 2017 with no flare.     Last OV: 1-18    Interim hx 1/8/2018:  Doing well today. No flares in joint pain. Very ocasionaly when stands up out of bed in morning will have pain in feet, but this resolves in seconds. Not needing to take any NSAIDs. L hand 3rd digit occasionally triggers. No joint swelling or morning stiffness. No paresthesias. Back to smoking 1 pack per week and this is distressing to her. Has some mild persistent stomach upset, no actual nausea/vomiting, abdominal pain, diarrhea, or constipation, but it is something she notices. Briefly tried to increase folic acid around time she takes methotrexate but this didn't help.    Interim 6-18:  Doing great. No joint pain. No morning  stiffness. No eye, skin, or bowel symptoms. Active in her garden, using her hands a lot. No medication for inflammation needed.  Still smoking 1 pack per week.       Review of Systems:   Review Of Systems  Constitutional: denies f/s/c  Skin: No skin rash.  Eyes: No vision change, dry eyes  Ears/Nose/Throat: once  Respiratory: No SOB, cough  Cardiovascular: no cp, palpitations  Gastrointestinal: no change in BM  Neurologic: no numbness, tingling.   Endocrine: +hx Hashimotos             Past Medical History:     Past Medical History:   Diagnosis Date     Dysplasia of cervix 1990's    had cryotx     Hyperlipidemia LDL goal < 130 7/31/2014     Hypothyroidism      Menopause age 48     Nephrolithiasis 1/11    s/p ESWL     Rheumatoid arthritis (H) 6/15     Smoker      Ulcerative colitis (H) 1-06     Past Surgical History:   Procedure Laterality Date     COLONOSCOPY  3-14-14     CRYOTHERAPY, CERVICAL  EARLY 1990's     HC REMOVAL GALLBLADDER  2-01    and repair ventral hernia            Social History:     Social History     Occupational History      [employer]     Social History Main Topics     Smoking status: Former Smoker     Packs/day: 0.25     Types: Cigarettes     Quit date: 5/22/2015     Smokeless tobacco: Never Used      Comment: E Cig      Alcohol use Yes      Comment: occasional wine (twice per year)     Drug use: No     Sexual activity: Not Currently     Partners: Male   smoked for 30 years - quit spring 2017, restarted smoking 1 pack per week  EtOH use - none  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          Family History:     Family History   Problem Relation Age of Onset     Breast Cancer Mother      Thyroid Disease Mother      Diabetes Maternal Grandmother      Hypertension Maternal Grandmother      Cerebrovascular Disease Maternal Grandmother      Cancer Maternal Grandmother      Hypertension Paternal Grandmother      Breast Cancer Paternal Grandmother      Cancer Paternal  Grandmother      Thyroid Disease Sister      Skin Cancer Sister      Asthma Son      Melanoma No family hx of             Allergies:     Allergies   Allergen Reactions     Codeine      Nausea and vomiting     Dye [Contrast Dye]             Medications:     Current Outpatient Prescriptions   Medication Sig Dispense Refill     adalimumab (HUMIRA PEN) 40 MG/0.8ML pen kit Inject 0.8 mLs (40 mg) Subcutaneous every 14 days 6 each 1     folic acid (FOLVITE) 1 MG tablet Take 1 tab daily. Day prior to, day of, and day after MTX take 2 tabs. 102 tablet 3     ibuprofen (ADVIL,MOTRIN) 600 MG tablet Take 600 mg by mouth as needed Reported on 4/3/2017       levothyroxine (SYNTHROID/LEVOTHROID) 112 MCG tablet Take 1 tablet (112 mcg) by mouth daily 90 tablet 3     methotrexate 2.5 MG tablet CHEMO Take 4 tablets (10 mg) by mouth once a week 16 tablet 0            Physical Exam:   /89 (BP Location: Right arm)  Pulse 64  Temp 97.7  F (36.5  C) (Oral)  Wt 64.4 kg (141 lb 14.4 oz)  SpO2 99%  BMI 24.83 kg/m2  141 lbs 14.4 oz     Constitutional: well-developed, appearing stated age; cooperative  Eyes: nl EOM, PERRLA, vision, conjunctiva, sclera  ENT: nl external ears, nose, hearing, lips, teeth, gums, throat  No mucous membrane lesions, normal saliva pool  Neck: no mass or thyroid enlargement  Resp: lungs clear to auscultation, nl to palpation  CV: RRR, no murmurs, rubs or gallops, no edema  GI: no ABD mass or tenderness, no HSM  Lymph: no cervical, supraclavicular, or epitrochlear nodes  MS: All TMJ, neck, shoulder, elbow, wrist, MCP/PIP/DIP, spine, hip, knee, ankle, and foot MTP/IP joints were examined.  - mild joint laxity of BL wrists   - L elbow w/ 5 deg contraction flexure w/o active synovitis (unchanged)  - L hand 3rd PIP slight contraction flexure 2/2 prior fracture. Mild laxity of 3rd MCP without synovitis.   Normal  strength. No dactylitis,  tenosynovitis, enthesopathy.  Skin: no nail pitting, alopecia, rash,  lesions  Neuro: grossly nl cranial nerves, strength   Psych: nl judgement, orientation, memory, affect.         Data:     Results for orders placed or performed in visit on 06/22/18   CBC with platelets differential   Result Value Ref Range    WBC 6.6 4.0 - 11.0 10e9/L    RBC Count 3.84 3.8 - 5.2 10e12/L    Hemoglobin 12.7 11.7 - 15.7 g/dL    Hematocrit 37.1 35.0 - 47.0 %    MCV 97 78 - 100 fl    MCH 33.1 (H) 26.5 - 33.0 pg    MCHC 34.2 31.5 - 36.5 g/dL    RDW 12.8 10.0 - 15.0 %    Platelet Count 269 150 - 450 10e9/L    Diff Method Automated Method     % Neutrophils 51.4 %    % Lymphocytes 32.5 %    % Monocytes 11.4 %    % Eosinophils 3.2 %    % Basophils 1.5 %    Absolute Neutrophil 3.4 1.6 - 8.3 10e9/L    Absolute Lymphocytes 2.2 0.8 - 5.3 10e9/L    Absolute Monocytes 0.8 0.0 - 1.3 10e9/L    Absolute Eosinophils 0.2 0.0 - 0.7 10e9/L    Absolute Basophils 0.1 0.0 - 0.2 10e9/L   ALT   Result Value Ref Range    ALT 18 0 - 50 U/L   AST   Result Value Ref Range    AST 17 0 - 45 U/L   Albumin level   Result Value Ref Range    Albumin 3.5 3.4 - 5.0 g/dL   Creatinine   Result Value Ref Range    Creatinine 0.81 0.52 - 1.04 mg/dL    GFR Estimate 74 >60 mL/min/1.7m2    GFR Estimate If Black 89 >60 mL/min/1.7m2     Cyclic Cit Pept IgG/IgA   Date Value Ref Range Status   07/06/2015 205 (H) <20 UNITS Final     Comment:     Strongly Positive     Hemoglobin   Date Value Ref Range Status   06/22/2018 12.7 11.7 - 15.7 g/dL Final   12/29/2017 12.4 11.7 - 15.7 g/dL Final   10/11/2017 11.4 (L) 11.7 - 15.7 g/dL Final     Urea Nitrogen   Date Value Ref Range Status   05/28/2015 18 7 - 30 mg/dL Final     Sed Rate   Date Value Ref Range Status   10/11/2017 7 0 - 30 mm/h Final   06/30/2017 7 0 - 30 mm/h Final   03/21/2017 6 0 - 30 mm/h Final     C-Reactive Protein   Date Value Ref Range Status   02/20/2015 <2.0 0 - 0.8 mg/dL Final     CRP Inflammation   Date Value Ref Range Status   10/11/2017 <2.9 0.0 - 8.0 mg/L Final   06/30/2017 <2.9  0.0 - 8.0 mg/L Final   03/21/2017 <2.9 0.0 - 8.0 mg/L Final     AST   Date Value Ref Range Status   06/22/2018 17 0 - 45 U/L Final   12/29/2017 18 0 - 45 U/L Final   10/11/2017 11 0 - 45 U/L Final     Albumin   Date Value Ref Range Status   06/22/2018 3.5 3.4 - 5.0 g/dL Final   12/29/2017 3.5 3.4 - 5.0 g/dL Final   08/22/2016 3.7 3.4 - 5.0 g/dL Final     Alkaline Phosphatase   Date Value Ref Range Status   05/28/2015 99 40 - 150 U/L Final     ALT   Date Value Ref Range Status   06/22/2018 18 0 - 50 U/L Final   12/29/2017 26 0 - 50 U/L Final   10/11/2017 20 0 - 50 U/L Final     Rheumatoid Factor   Date Value Ref Range Status   05/28/2015 49 (H) <20 IU/mL Final     Recent Labs   Lab Test  06/22/18   0733  12/29/17   0756  10/11/17   0849  06/30/17   0845   12/12/16   0830   05/28/15   1631   WBC  6.6  7.4  5.6  6.2   < >   --    < >  7.2   HGB  12.7  12.4  11.4*  13.3   < >   --    < >  13.5   HCT  37.1  36.1  33.4*  38.1   < >   --    < >  39.8   MCV  97  97  97  96   < >   --    < >  92   PLT  269  244  228  241   < >   --    < >  282   BUN   --    --    --    --    --    --    --   18   TSH   --    --    --   0.86   --   1.31   --   4.09*   AST  17  18  11  17   < >   --    < >  16   ALT  18  26  20  24   < >   --    < >  20   ALKPHOS   --    --    --    --    --    --    --   99    < > = values in this interval not displayed.     BL hand xray 8/2016:  Impression:  1. No acute osseous abnormality.  2. No radiographic evidence of inflammatory arthritis.    Reviewed Rheumatology lab flow sheet      Augustine Fields MD

## 2018-06-28 NOTE — MR AVS SNAPSHOT
After Visit Summary   6/28/2018    Carolina Mora    MRN: [id number]           Patient Information     Date Of Birth          1960        Visit Information        Provider Department      6/28/2018 8:30 AM Augustine Fields MD [facility]        Today's Diagnoses     Encounter for long-term (current) use of high-risk medication        Rheumatoid arthritis involving multiple sites with positive rheumatoid factor (H)        Elbow pain, left          Care Instructions    Dx: RA in remission.  Plan reduce methotrexate to 3 tabs weekly. Continue humira.          Follow-ups after your visit        Follow-up notes from your care team     Return in about 6 months (around 12/28/2018).      Your next 10 appointments already scheduled     Jul 02, 2018 10:40 AM CDT   PHYSICAL with Michael Beltrán MD   [facility]    [address] [place] [address]   [phone]            Dec 28, 2018  8:30 AM CST   (Arrive by 8:15 AM)   Return Visit with Augustine Fields MD   [facility] ([facility])    [address] [place] [address]  [address] [place] [address] [phone]              Who to contact     If you have questions or need follow up information about today's clinic visit or your schedule please contact [facility] R[facility]UMAT[facility] directly at [phone].  Normal or non-critical lab and imaging results will be communicated to you by MyChart, letter or phone within 4 business days after the clinic has received the results. If you do not hear from us within 7 days, please contact the clinic through MyChart or phone. If you have a critical or abnormal lab result, we will notify you by phone as soon as possible.  Submit refill requests through [employer] or call your pharmacy and they will forward the refill request to us. Please allow 3 business days for your refill to be completed.           Additional Information About Your Visit        Keep Your Pharmacy Openhart Information     [employer] gives you secure access to your electronic health record. If you see a primary care provider, you can also send messages to your care team and make appointments. If you have questions, please call your primary care clinic.  If you do not have a primary care provider, please call [phone] and they will assist you.        Care EveryWhere ID     This is your Care EveryWhere ID. This could be used by other organizations to access your [place] medical records  [id number]        Your Vitals Were     Pulse Temperature Pulse Oximetry BMI (Body Mass Index)          64 97.7  F (36.5  C) (Oral) 99% 24.83 kg/m2         Blood Pressure from Last 3 Encounters:   06/28/18 158/89   01/08/18 133/78   07/10/17 125/83    Weight from Last 3 Encounters:   06/28/18 64.4 kg (141 lb 14.4 oz)   01/08/18 65.6 kg (144 lb 9.6 oz)   07/10/17 64.8 kg (142 lb 14.4 oz)              Today, you had the following     No orders found for display         Today's Medication Changes          These changes are accurate as of 6/28/18  9:14 AM.  If you have any questions, ask your nurse or doctor.               These medicines have changed or have updated prescriptions.        Dose/Directions    folic acid 1 MG tablet   Commonly known as:  FOLVITE   This may have changed:  additional instructions   Used for:  Encounter for long-term (current) use of high-risk medication, Rheumatoid arthritis involving multiple sites with positive rheumatoid factor (H), Elbow pain, left   Changed by:  Augustine Fields MD        Take 2 tabs daily.   Quantity:  180 tablet   Refills:  3       methotrexate 2.5 MG tablet CHEMO   This may have changed:    - how much to take  - how to take this  - when to take this  - additional instructions   Used for:  Rheumatoid arthritis involving multiple sites with positive rheumatoid factor (H), Encounter for long-term (current) use of high-risk  medication, Elbow pain, left   Changed by:  Augustine Fields MD        3 tabs by mouth weekly   Quantity:  12 tablet   Refills:  6            Where to get your medicines      These medications were sent to [address] [address]     Phone:  [phone]     adalimumab 40 MG/0.8ML pen kit         These medications were sent to [place] [address] [facility] - [place], [place] - [address]  [address] [facility] [address]     Phone:  [phone]     folic acid 1 MG tablet    methotrexate 2.5 MG tablet CHEMO                Primary Care Provider Office Phone # Fax #    Michael Beltrán [phone]       [address] [facility] [place] [address]        Equal Access to Services     [facility]: Hadii amparo garcia hadasho Soomaali, waaxda luqadaha, qaybta kaalmada adeegyada, waxjohn paul mallory . So [facility] [phone].    ATENCIÓN: Si habla español, tiene a oneill disposición servicios gratuitos de asistencia lingüística. [facility] [phone].    We comply with applicable federal civil rights laws and [place] laws. We do not discriminate on the basis of race, color, national origin, age, disability, sex, sexual orientation, or gender identity.            Thank you!     Thank you for choosing [facility] RHEUMATOLOGY  for your care. Our goal is always to provide you with excellent care. Hearing back from our patients is one way we can continue to improve our services. Please take a few minutes to complete the written survey that you may receive in the mail after your visit with us. Thank you!             Your Updated Medication List - Protect others around you: Learn how to safely use, store and throw away your medicines at www.disposemymeds.org.          This list is accurate as of 6/28/18  9:14 AM.  Always use your most recent med list.                   Brand Name Dispense Instructions for use  Diagnosis    adalimumab 40 MG/0.8ML pen kit    HUMIRA PEN    6 each    Inject 0.8 mLs (40 mg) Subcutaneous every 14 days    Encounter for long-term (current) use of high-risk medication, Elbow pain, left, Rheumatoid arthritis involving multiple sites with positive rheumatoid factor (H)       folic acid 1 MG tablet    FOLVITE    180 tablet    Take 2 tabs daily.    Encounter for long-term (current) use of high-risk medication, Rheumatoid arthritis involving multiple sites with positive rheumatoid factor (H), Elbow pain, left       ibuprofen 600 MG tablet    ADVIL/MOTRIN     Take 600 mg by mouth as needed Reported on 4/3/2017        levothyroxine 112 MCG tablet    SYNTHROID/LEVOTHROID    90 tablet    Take 1 tablet (112 mcg) by mouth daily    Hypothyroidism due to acquired atrophy of thyroid       methotrexate 2.5 MG tablet CHEMO     12 tablet    3 tabs by mouth weekly    Rheumatoid arthritis involving multiple sites with positive rheumatoid factor (H), Encounter for long-term (current) use of high-risk medication, Elbow pain, left

## 2018-07-02 ENCOUNTER — OFFICE VISIT (OUTPATIENT)
Dept: FAMILY MEDICINE | Facility: CLINIC | Age: 58
End: 2018-07-02
Payer: COMMERCIAL

## 2018-07-02 VITALS
WEIGHT: 140 LBS | BODY MASS INDEX: 24.8 KG/M2 | OXYGEN SATURATION: 98 % | HEART RATE: 63 BPM | HEIGHT: 63 IN | TEMPERATURE: 98.4 F | SYSTOLIC BLOOD PRESSURE: 130 MMHG | DIASTOLIC BLOOD PRESSURE: 76 MMHG

## 2018-07-02 DIAGNOSIS — Z11.4 SCREENING FOR HIV (HUMAN IMMUNODEFICIENCY VIRUS): ICD-10-CM

## 2018-07-02 DIAGNOSIS — K43.9 VENTRAL HERNIA WITHOUT OBSTRUCTION OR GANGRENE: ICD-10-CM

## 2018-07-02 DIAGNOSIS — K51.819 OTHER ULCERATIVE COLITIS WITH COMPLICATION (H): ICD-10-CM

## 2018-07-02 DIAGNOSIS — Z00.00 ROUTINE GENERAL MEDICAL EXAMINATION AT A HEALTH CARE FACILITY: Primary | ICD-10-CM

## 2018-07-02 DIAGNOSIS — M05.79 RHEUMATOID ARTHRITIS INVOLVING MULTIPLE SITES WITH POSITIVE RHEUMATOID FACTOR (H): ICD-10-CM

## 2018-07-02 DIAGNOSIS — R03.0 ELEVATED BLOOD PRESSURE READING WITHOUT DIAGNOSIS OF HYPERTENSION: ICD-10-CM

## 2018-07-02 DIAGNOSIS — E78.5 HYPERLIPIDEMIA WITH TARGET LDL LESS THAN 130: ICD-10-CM

## 2018-07-02 DIAGNOSIS — E03.4 HYPOTHYROIDISM DUE TO ACQUIRED ATROPHY OF THYROID: ICD-10-CM

## 2018-07-02 LAB
CHOLEST SERPL-MCNC: 201 MG/DL
GLUCOSE SERPL-MCNC: 84 MG/DL (ref 70–99)
HDLC SERPL-MCNC: 57 MG/DL
LDLC SERPL CALC-MCNC: 127 MG/DL
NONHDLC SERPL-MCNC: 144 MG/DL
TRIGL SERPL-MCNC: 85 MG/DL
TSH SERPL DL<=0.005 MIU/L-ACNC: 1.25 MU/L (ref 0.4–4)

## 2018-07-02 PROCEDURE — 84443 ASSAY THYROID STIM HORMONE: CPT | Performed by: FAMILY MEDICINE

## 2018-07-02 PROCEDURE — 82947 ASSAY GLUCOSE BLOOD QUANT: CPT | Performed by: FAMILY MEDICINE

## 2018-07-02 PROCEDURE — 99213 OFFICE O/P EST LOW 20 MIN: CPT | Mod: 25 | Performed by: FAMILY MEDICINE

## 2018-07-02 PROCEDURE — 99396 PREV VISIT EST AGE 40-64: CPT | Performed by: FAMILY MEDICINE

## 2018-07-02 PROCEDURE — 87389 HIV-1 AG W/HIV-1&-2 AB AG IA: CPT | Performed by: FAMILY MEDICINE

## 2018-07-02 PROCEDURE — 36415 COLL VENOUS BLD VENIPUNCTURE: CPT | Performed by: FAMILY MEDICINE

## 2018-07-02 PROCEDURE — 80061 LIPID PANEL: CPT | Performed by: FAMILY MEDICINE

## 2018-07-02 RX ORDER — LEVOTHYROXINE SODIUM 112 UG/1
112 TABLET ORAL DAILY
Qty: 90 TABLET | Refills: 3 | Status: SHIPPED | OUTPATIENT
Start: 2018-07-02 | End: 2019-05-02

## 2018-07-02 NOTE — PROGRESS NOTES
SUBJECTIVE:   CC: Carolina Mora is an 57 year old woman who presents for a preventive health visit and follow-up on some baseline health conditions.     Physical   Annual:     Getting at least 3 servings of Calcium per day:  Yes    Bi-annual eye exam:  Yes    Dental care twice a year:  Yes    Sleep apnea or symptoms of sleep apnea:  None    Diet:  Regular (no restrictions)    Frequency of exercise:  4-5 days/week    Duration of exercise:  30-45 minutes    Taking medications regularly:  Yes    Medication side effects:  None    Additional concerns today:  No    She saw her rheumatologist last week for her rheumatoid arthritis.  She is on methotrexate and Humira for that.    She does have a history of ulcerative colitis as well.  She is overdue for a 2 year follow-up colonoscopy.  She plans to schedule that in the near future along with her mammogram.  She is not having any bloody diarrhea now.  She remains on levothyroxine for hypothyroidism.  She feels fairly stable in that area.  She does have a history of hyperlipidemia.  She came in fasting today to recheck that.  She does acknowledge still smoking in the work setting, but she has quit all smoking and all other areas of her life.  She does have a fair amount of stress at work.        Today's PHQ-2 Score:   PHQ-2 ( 1999 [employer]) 7/2/2018   Q1: Little interest or pleasure in doing things 0   Q2: Feeling down, depressed or hopeless 0   PHQ-2 Score 0   Q1: Little interest or pleasure in doing things Not at all   Q2: Feeling down, depressed or hopeless Not at all   PHQ-2 Score 0       Abuse: Current or Past(Physical, Sexual or Emotional)- No  Do you feel safe in your environment - Yes    Social History   Substance Use Topics     Smoking status: Former Smoker     Packs/day: 0.25     Types: Cigarettes     Quit date: 5/22/2015     Smokeless tobacco: Never Used      Comment: E Cig      Alcohol use Yes      Comment: occasional wine (twice per year)     Alcohol Use  7/2/2018   If you drink alcohol do you typically have greater than 3 drinks per day OR greater than 7 drinks per week? Not Applicable       Reviewed orders with patient.  Reviewed health maintenance and updated orders accordingly - Yes  Patient Active Problem List   Diagnosis     Ulcerative colitis (H)     History of nephrolithiasis     Hyperlipidemia with target LDL less than 130     Ventral hernia without obstruction or gangrene     Rheumatoid arthritis involving multiple sites with positive rheumatoid factor (H)     Encounter for long-term (current) use of high-risk medication     Hypothyroidism due to acquired atrophy of thyroid     Past Surgical History:   Procedure Laterality Date     COLONOSCOPY  3-14-14     CRYOTHERAPY, CERVICAL  EARLY 1990's     HC REMOVAL GALLBLADDER  2-01    and repair ventral hernia       Social History   Substance Use Topics     Smoking status: Former Smoker     Packs/day: 0.25     Types: Cigarettes     Quit date: 5/22/2015     Smokeless tobacco: Never Used      Comment: E Cig      Alcohol use Yes      Comment: occasional wine (twice per year)     Family History   Problem Relation Age of Onset     Breast Cancer Mother      Thyroid Disease Mother      Diabetes Maternal Grandmother      Hypertension Maternal Grandmother      Cerebrovascular Disease Maternal Grandmother      Cancer Maternal Grandmother      Hypertension Paternal Grandmother      Breast Cancer Paternal Grandmother      Cancer Paternal Grandmother      Thyroid Disease Sister      Skin Cancer Sister      Asthma Son      Melanoma No family hx of          Current Outpatient Prescriptions   Medication Sig Dispense Refill     adalimumab (HUMIRA PEN) 40 MG/0.8ML pen kit Inject 0.8 mLs (40 mg) Subcutaneous every 14 days 6 each 6     folic acid (FOLVITE) 1 MG tablet Take 2 tabs daily. 180 tablet 3     ibuprofen (ADVIL,MOTRIN) 600 MG tablet Take 600 mg by mouth as needed Reported on 4/3/2017       levothyroxine (SYNTHROID/LEVOTHROID)  "112 MCG tablet Take 1 tablet (112 mcg) by mouth daily 90 tablet 3     methotrexate 2.5 MG tablet CHEMO 3 tabs by mouth weekly 12 tablet 6     [DISCONTINUED] levothyroxine (SYNTHROID/LEVOTHROID) 112 MCG tablet Take 1 tablet (112 mcg) by mouth daily 90 tablet 3     Allergies   Allergen Reactions     Codeine      Nausea and vomiting     Dye [Contrast Dye]        Patient over age 50, mutual decision to screen reflected in health maintenance.    Pertinent mammograms are reviewed under the imaging tab.  History of abnormal Pap smear: Remotely years ago - age 30-65 PAP every 5 years with negative HPV co-testing recommended  PAP / HPV Latest Ref Rng & Units 6/30/2017 7/26/2013 7/24/2012   PAP - NIL NIL NIL   HPV 16 DNA NEG Negative - -   HPV 18 DNA NEG Negative - -   OTHER HR HPV NEG Negative - -     Reviewed and updated as needed this visit by clinical staff  Tobacco  Allergies  Meds  Med Hx  Surg Hx  Fam Hx  Soc Hx        Reviewed and updated as needed this visit by Provider            Review of Systems  CONSTITUTIONAL: NEGATIVE for fever, chills, change in weight  INTEGUMENTARY/SKIN: NEGATIVE for worrisome rashes, moles or lesions  EYES: NEGATIVE for vision changes or irritation  ENT: NEGATIVE for ear, mouth and throat problems  RESP: NEGATIVE for significant cough or SOB  BREAST: NEGATIVE for masses, tenderness or discharge  CV: NEGATIVE for chest pain, palpitations or peripheral edema  GI: Generally doing well with her active colitis  : NEGATIVE for unusual urinary or vaginal symptoms. No vaginal bleeding.  MUSCULOSKELETAL: Generally doing well with her rheumatoid arthritis  NEURO: NEGATIVE for weakness, dizziness or paresthesias  PSYCHIATRIC: Has some work stress     OBJECTIVE:   /76 (BP Location: Left arm, Patient Position: Chair, Cuff Size: Adult Regular)  Pulse 63  Temp 98.4  F (36.9  C) (Oral)  Ht 5' 3.25\" (1.607 m)  Wt 140 lb (63.5 kg)  SpO2 98%  BMI 24.6 kg/m2  Physical Exam  GENERAL: " healthy, alert and no distress  EYES: Eyes grossly normal to inspection, PERRL and conjunctivae and sclerae normal  HENT: Grossly normal  NECK: no adenopathy, no asymmetry, masses, or scars and thyroid normal to palpation  RESP: lungs clear to auscultation - no rales, rhonchi or wheezes  CV: regular rate and rhythm, normal S1 S2, no S3 or S4, no murmur, click or rub, no peripheral edema and peripheral pulses strong  ABDOMEN: soft, nontender, no hepatosplenomegaly, no masses; has a stable small ventral hernia  MS: no gross musculoskeletal defects noted, no edema  SKIN: no suspicious lesions or rashes  NEURO: Normal strength and tone, mentation intact and speech normal  PSYCH: mentation appears normal, affect normal/bright    Diagnostic Test Results:  none     ASSESSMENT/PLAN:       ICD-10-CM    1. Routine general medical examination at a health care facility Z00.00 Lipid panel reflex to direct LDL Fasting     Glucose   2. Hypothyroidism due to acquired atrophy of thyroid E03.4 TSH with free T4 reflex     levothyroxine (SYNTHROID/LEVOTHROID) 112 MCG tablet   3. Rheumatoid arthritis involving multiple sites with positive rheumatoid factor (H) M05.79    4. Other ulcerative colitis with complication (H) K51.819    5. Ventral hernia without obstruction or gangrene K43.9    6. Hyperlipidemia with target LDL less than 130 E78.5    7. Elevated blood pressure reading without diagnosis of hypertension R03.0    8. Screening for HIV (human immunodeficiency virus) Z11.4 HIV Screening     Her blood pressure was slightly elevated when she first came in today, it was elevated at her rheumatology appointment last week  We reviewed nonpharmacologic treatment for that, including complete smoking cessation  We will check fasting labs today as above  Continue same thyroid medication for now  Continue same RA meds for now and ongoing follow-up with rheumatology for that  Schedule the 2 year follow-up colonoscopy  I advised a recheck on  "her blood pressure in about 3 months    COUNSELING:  Reviewed preventive health counseling, as reflected in patient instructions       Regular exercise       Healthy diet/nutrition    BP Readings from Last 1 Encounters:   06/28/18 158/89     Estimated body mass index is 24.83 kg/(m^2) as calculated from the following:    Height as of 7/10/17: 5' 3.39\" (1.61 m).    Weight as of 6/28/18: 141 lb 14.4 oz (64.4 kg).           reports that she quit smoking about 3 years ago. Her smoking use included Cigarettes. She smoked 0.25 packs per day. She has never used smokeless tobacco.  Tobacco Cessation Action Plan: She just needs to quit in the work setting now    Counseling Resources:  ATP IV Guidelines  Pooled Cohorts Equation Calculator  Breast Cancer Risk Calculator  FRAX Risk Assessment  ICSI Preventive Guidelines  Dietary Guidelines for Americans, 2010  USDA's MyPlate  ASA Prophylaxis  Lung CA Screening    Michael Beltrán MD  [facility]    Answers for HPI/ROS submitted by the patient on 7/2/2018   PHQ-2 Score: 0    "

## 2018-07-02 NOTE — MR AVS SNAPSHOT
After Visit Summary   7/2/2018    Carolina Mora    MRN: [id number]           Patient Information     Date Of Birth          1960        Visit Information        Provider Department      7/2/2018 10:40 AM Michael Beltrán MD [facility]        Today's Diagnoses     Routine general medical examination at a health care facility    -  1    Hypothyroidism due to acquired atrophy of thyroid        Rheumatoid arthritis involving multiple sites with positive rheumatoid factor (H)        Other ulcerative colitis with complication (H)        Ventral hernia without obstruction or gangrene        Hyperlipidemia with target LDL less than 130        Screening for HIV (human immunodeficiency virus)          Care Instructions      Preventive Health Recommendations  Female Ages 50 - 64    Yearly exam: See your health care provider every year in order to  o Review health changes.   o Discuss preventive care.    o Review your medicines if your doctor has prescribed any.      Get a Pap test every three years (unless you have an abnormal result and your provider advises testing more often).    If you get Pap tests with HPV test, you only need to test every 5 years, unless you have an abnormal result.     You do not need a Pap test if your uterus was removed (hysterectomy) and you have not had cancer.    You should be tested each year for STDs (sexually transmitted diseases) if you're at risk.     Have a mammogram every 1 to 2 years.    Have a colonoscopy at age 50, or have a yearly FIT test (stool test). These exams screen for colon cancer.      Have a cholesterol test every 5 years, or more often if advised.    Have a diabetes test (fasting glucose) every three years. If you are at risk for diabetes, you should have this test more often.     If you are at risk for osteoporosis (brittle bone disease), think about having a bone density scan (DEXA).    Shots: Get a flu shot each year. Get a  tetanus shot every 10 years.    Nutrition:     Eat at least 5 servings of fruits and vegetables each day.    Eat whole-grain bread, whole-wheat pasta and brown rice instead of white grains and rice.    Get adequate Calcium and Vitamin D.     Lifestyle    Exercise at least 150 minutes a week (30 minutes a day, 5 days a week). This will help you control your weight and prevent disease.    Limit alcohol to one drink per day.    No smoking.     Wear sunscreen to prevent skin cancer.     See your dentist every six months for an exam and cleaning.    See your eye doctor every 1 to 2 years.            Follow-ups after your visit        Follow-up notes from your care team     Return in about 1 year (around 7/2/2019).      Your next 10 appointments already scheduled     Dec 28, 2018  8:30 AM CST   (Arrive by 8:15 AM)   Return Visit with Augustine Fields MD   [facility] ([facility])    [address]  [address]  [place] [address] [phone]              Who to contact     If you have questions or need follow up information about today's clinic visit or your schedule please contact [facility] directly at [phone].  Normal or non-critical lab and imaging results will be communicated to you by MyChart, letter or phone within 4 business days after the clinic has received the results. If you do not hear from us within 7 days, please contact the clinic through [employer] or phone. If you have a critical or abnormal lab result, we will notify you by phone as soon as possible.  Submit refill requests through [employer] or call your pharmacy and they will forward the refill request to us. Please allow 3 business days for your refill to be completed.          Additional Information About Your Visit        [employer] Information     [employer] gives you secure access to your electronic health record. If you see a primary care provider, you can also send messages to  "your care team and make appointments. If you have questions, please call your primary care clinic.  If you do not have a primary care provider, please call [phone] and they will assist you.        Care EveryWhere ID     This is your Care EveryWhere ID. This could be used by other organizations to access your [facility] medical records  [id number]        Your Vitals Were     Pulse Temperature Height Pulse Oximetry BMI (Body Mass Index)       63 98.4  F (36.9  C) (Oral) 5' 3.25\" (1.607 m) 98% 24.6 kg/m2        Blood Pressure from Last 3 Encounters:   07/02/18 147/85   06/28/18 158/89   01/08/18 133/78    Weight from Last 3 Encounters:   07/02/18 140 lb (63.5 kg)   06/28/18 141 lb 14.4 oz (64.4 kg)   01/08/18 144 lb 9.6 oz (65.6 kg)              We Performed the Following     Glucose     HIV Screening     Lipid panel reflex to direct LDL Fasting     TSH with free T4 reflex          Where to get your medicines      These medications were sent to [facility] [address] [place], [place] - [address]  [address] [address]     Phone:  [phone]     levothyroxine 112 MCG tablet          Primary Care Provider Office Phone # Fax #    Michael Beltrán [phone]       [address] [facility] [address]        Equal Access to Services     TOMAS MAE AH: Hadii amparo garcia hadasho Sojohnali, waaxda luqadaha, qaybta kaalmada rey, gregorio rivera. So [facility] [phone].    ATENCIÓN: Si habla español, tiene a oneill disposición servicios gratuitos de asistencia lingüística. Llame al [phone].    We comply with applicable federal civil rights laws and [place] laws. We do not discriminate on the basis of race, color, national origin, age, disability, sex, sexual orientation, or gender identity.            Thank you!     Thank you for choosing [facility]  for your care. Our goal is always to provide you with excellent " care. Hearing back from our patients is one way we can continue to improve our services. Please take a few minutes to complete the written survey that you may receive in the mail after your visit with us. Thank you!             Your Updated Medication List - Protect others around you: Learn how to safely use, store and throw away your medicines at www.disposemymeds.org.          This list is accurate as of 7/2/18 11:28 AM.  Always use your most recent med list.                   Brand Name Dispense Instructions for use Diagnosis    adalimumab 40 MG/0.8ML pen kit    HUMIRA PEN    6 each    Inject 0.8 mLs (40 mg) Subcutaneous every 14 days    Encounter for long-term (current) use of high-risk medication, Elbow pain, left, Rheumatoid arthritis involving multiple sites with positive rheumatoid factor (H)       folic acid 1 MG tablet    FOLVITE    180 tablet    Take 2 tabs daily.    Encounter for long-term (current) use of high-risk medication, Rheumatoid arthritis involving multiple sites with positive rheumatoid factor (H), Elbow pain, left       ibuprofen 600 MG tablet    ADVIL/MOTRIN     Take 600 mg by mouth as needed Reported on 4/3/2017        levothyroxine 112 MCG tablet    SYNTHROID/LEVOTHROID    90 tablet    Take 1 tablet (112 mcg) by mouth daily    Hypothyroidism due to acquired atrophy of thyroid       methotrexate 2.5 MG tablet CHEMO     12 tablet    3 tabs by mouth weekly    Rheumatoid arthritis involving multiple sites with positive rheumatoid factor (H), Encounter for long-term (current) use of high-risk medication, Elbow pain, left

## 2018-07-03 LAB — HIV 1+2 AB+HIV1 P24 AG SERPL QL IA: NONREACTIVE

## 2018-07-03 NOTE — PROGRESS NOTES
Carolina,  Your thyroid test remains normal, so please continue your same dose. Blood sugar and HIV tests are normal. Cholesterol values are the best they have been in years! Keep up the good work!    Michael Beltrán MD

## 2018-07-17 DIAGNOSIS — Z79.899 ENCOUNTER FOR LONG-TERM (CURRENT) USE OF HIGH-RISK MEDICATION: ICD-10-CM

## 2018-07-17 DIAGNOSIS — M25.522 ELBOW PAIN, LEFT: ICD-10-CM

## 2018-07-17 DIAGNOSIS — M05.79 RHEUMATOID ARTHRITIS INVOLVING MULTIPLE SITES WITH POSITIVE RHEUMATOID FACTOR (H): ICD-10-CM

## 2018-07-18 ENCOUNTER — RADIANT APPOINTMENT (OUTPATIENT)
Dept: MAMMOGRAPHY | Facility: CLINIC | Age: 58
End: 2018-07-18
Attending: FAMILY MEDICINE
Payer: COMMERCIAL

## 2018-07-18 DIAGNOSIS — Z12.31 VISIT FOR SCREENING MAMMOGRAM: ICD-10-CM

## 2018-07-18 PROCEDURE — 77067 SCR MAMMO BI INCL CAD: CPT | Mod: TC

## 2018-07-18 PROCEDURE — 77063 BREAST TOMOSYNTHESIS BI: CPT | Mod: TC

## 2018-07-23 ENCOUNTER — RADIANT APPOINTMENT (OUTPATIENT)
Dept: MAMMOGRAPHY | Facility: CLINIC | Age: 58
End: 2018-07-23
Attending: FAMILY MEDICINE
Payer: COMMERCIAL

## 2018-07-23 DIAGNOSIS — R92.8 ABNORMAL MAMMOGRAM: ICD-10-CM

## 2018-08-03 ENCOUNTER — TELEPHONE (OUTPATIENT)
Dept: RHEUMATOLOGY | Facility: CLINIC | Age: 58
End: 2018-08-03

## 2018-08-03 NOTE — TELEPHONE ENCOUNTER
PA Initiation    Medication: HUMIRA PEN 40 MG/0.8ML pen kit  Insurance Company: [employer] - Phone [phone] Fax [phone]  Pharmacy Filling the Rx: [facility] [address]  Filling Pharmacy Phone: [phone]  Filling Pharmacy Fax:    Start Date: 8/3/2018    Central Prior Authorization Team   Phone: [phone]

## 2018-08-03 NOTE — TELEPHONE ENCOUNTER
Prior Authorization Specialty Medication Request    Medication/Dose: HUMIRA PEN 40 MG/0.8ML pen kit  ICD code (if different than what is on RX):    Encounter for long-term (current) use of high-risk medication [Z79.899]       Elbow pain, left [M25.522]       Rheumatoid arthritis involving multiple sites with positive rheumatoid factor (H) [M05.79]         Previously Tried and Failed:      Insurance Name: [employer]  Insurance ID: [id number]  BIN: [id number]  PCN: [id number]    Pharmacy Information (if different than what is on RX)  [facility]  Phone:  [phone]

## 2018-08-06 NOTE — TELEPHONE ENCOUNTER
Prior Authorization Approval    Authorization Effective Date: 8/3/2018  Authorization Expiration Date: 8/3/2019  Medication: HUMIRA PEN 40 MG/0.8ML pen kit  Approved Dose/Quantity: 6/84 days  Reference #: [id number]   Insurance Company: [employer] - Phone [phone] Fax [phone]  Which Pharmacy is filling the prescription (Not needed for infusion/clinic administered): [facility] - [address] [place] [address]  Pharmacy Notified: No  Patient Notified: No

## 2018-11-08 DIAGNOSIS — M25.522 ELBOW PAIN, LEFT: ICD-10-CM

## 2018-11-08 DIAGNOSIS — Z79.899 ENCOUNTER FOR LONG-TERM (CURRENT) USE OF HIGH-RISK MEDICATION: ICD-10-CM

## 2018-11-08 DIAGNOSIS — M05.79 RHEUMATOID ARTHRITIS INVOLVING MULTIPLE SITES WITH POSITIVE RHEUMATOID FACTOR (H): ICD-10-CM

## 2018-11-08 NOTE — TELEPHONE ENCOUNTER
methotrexate 2.5 MG tablet CHEMO       Last Written Prescription Date:  6/28/2018  Last Fill Quantity: 12,   # refills: 6  Last Office Visit: 6/28/2018  Future Office visit:  12/28/2018    CBC RESULTS:   Recent Labs   Lab Test  06/22/18   0733   WBC  6.6   RBC  3.84   HGB  12.7   HCT  37.1   MCV  97   MCH  33.1*   MCHC  34.2   RDW  12.8   PLT  269       Creatinine   Date Value Ref Range Status   06/22/2018 0.81 0.52 - 1.04 mg/dL Final   ]    Liver Function Studies -   Recent Labs   Lab Test  06/22/18   0733   05/28/15   1631   PROTTOTAL   --    --   7.8   ALBUMIN  3.5   < >  3.5   BILITOTAL   --    --   0.7   ALKPHOS   --    --   99   AST  17   < >  16   ALT  18   < >  20    < > = values in this interval not displayed.       Routing refill request to provider for review/approval because:  DMARD request for #36 tablets, 90 day supply.

## 2018-12-17 ENCOUNTER — TRANSFERRED RECORDS (OUTPATIENT)
Dept: HEALTH INFORMATION MANAGEMENT | Facility: CLINIC | Age: 58
End: 2018-12-17

## 2018-12-17 ENCOUNTER — PATIENT OUTREACH (OUTPATIENT)
Dept: CARE COORDINATION | Facility: CLINIC | Age: 58
End: 2018-12-17

## 2018-12-18 ENCOUNTER — MYC REFILL (OUTPATIENT)
Dept: RHEUMATOLOGY | Facility: CLINIC | Age: 58
End: 2018-12-18

## 2018-12-18 DIAGNOSIS — Z79.899 ENCOUNTER FOR LONG-TERM (CURRENT) USE OF HIGH-RISK MEDICATION: ICD-10-CM

## 2018-12-18 DIAGNOSIS — M05.79 RHEUMATOID ARTHRITIS INVOLVING MULTIPLE SITES WITH POSITIVE RHEUMATOID FACTOR (H): ICD-10-CM

## 2018-12-18 DIAGNOSIS — M25.522 ELBOW PAIN, LEFT: ICD-10-CM

## 2018-12-18 LAB
ALBUMIN SERPL-MCNC: 3.5 G/DL (ref 3.4–5)
ALT SERPL W P-5'-P-CCNC: 18 U/L (ref 0–50)
AST SERPL W P-5'-P-CCNC: 15 U/L (ref 0–45)
BASOPHILS # BLD AUTO: 0.1 10E9/L (ref 0–0.2)
BASOPHILS NFR BLD AUTO: 0.7 %
CREAT SERPL-MCNC: 0.72 MG/DL (ref 0.52–1.04)
DIFFERENTIAL METHOD BLD: NORMAL
EOSINOPHIL # BLD AUTO: 0.1 10E9/L (ref 0–0.7)
EOSINOPHIL NFR BLD AUTO: 2 %
ERYTHROCYTE [DISTWIDTH] IN BLOOD BY AUTOMATED COUNT: 13 % (ref 10–15)
GFR SERPL CREATININE-BSD FRML MDRD: 84 ML/MIN/1.7M2
HCT VFR BLD AUTO: 36.3 % (ref 35–47)
HGB BLD-MCNC: 12.3 G/DL (ref 11.7–15.7)
LYMPHOCYTES # BLD AUTO: 1.6 10E9/L (ref 0.8–5.3)
LYMPHOCYTES NFR BLD AUTO: 22.7 %
MCH RBC QN AUTO: 32.2 PG (ref 26.5–33)
MCHC RBC AUTO-ENTMCNC: 33.9 G/DL (ref 31.5–36.5)
MCV RBC AUTO: 95 FL (ref 78–100)
MONOCYTES # BLD AUTO: 0.6 10E9/L (ref 0–1.3)
MONOCYTES NFR BLD AUTO: 8.5 %
NEUTROPHILS # BLD AUTO: 4.6 10E9/L (ref 1.6–8.3)
NEUTROPHILS NFR BLD AUTO: 66.1 %
PLATELET # BLD AUTO: 257 10E9/L (ref 150–450)
RBC # BLD AUTO: 3.82 10E12/L (ref 3.8–5.2)
WBC # BLD AUTO: 6.9 10E9/L (ref 4–11)

## 2018-12-18 PROCEDURE — 85025 COMPLETE CBC W/AUTO DIFF WBC: CPT | Performed by: INTERNAL MEDICINE

## 2018-12-18 PROCEDURE — 84450 TRANSFERASE (AST) (SGOT): CPT | Performed by: INTERNAL MEDICINE

## 2018-12-18 PROCEDURE — 84460 ALANINE AMINO (ALT) (SGPT): CPT | Performed by: INTERNAL MEDICINE

## 2018-12-18 PROCEDURE — 36415 COLL VENOUS BLD VENIPUNCTURE: CPT | Performed by: INTERNAL MEDICINE

## 2018-12-18 PROCEDURE — 82565 ASSAY OF CREATININE: CPT | Performed by: INTERNAL MEDICINE

## 2018-12-18 PROCEDURE — 82040 ASSAY OF SERUM ALBUMIN: CPT | Performed by: INTERNAL MEDICINE

## 2018-12-26 ENCOUNTER — TELEPHONE (OUTPATIENT)
Dept: RHEUMATOLOGY | Facility: CLINIC | Age: 58
End: 2018-12-26

## 2018-12-26 DIAGNOSIS — M25.522 ELBOW PAIN, LEFT: ICD-10-CM

## 2018-12-26 DIAGNOSIS — M05.79 RHEUMATOID ARTHRITIS INVOLVING MULTIPLE SITES WITH POSITIVE RHEUMATOID FACTOR (H): ICD-10-CM

## 2018-12-26 DIAGNOSIS — Z79.899 ENCOUNTER FOR LONG-TERM (CURRENT) USE OF HIGH-RISK MEDICATION: ICD-10-CM

## 2018-12-26 NOTE — TELEPHONE ENCOUNTER
[facility] Call Center    Phone Message    May a detailed message be left on voicemail: yes    Reason for Call: Other: [facility], [phone] option 3 ,pt needs a Rx for a refill for medication adalimumab (HUMIRA PEN) 40 MG/0.8ML pen kit. Please call to give verbal order Pt is out of medication    Action Taken: Message routed to:  [facility] ([facility] Rheum

## 2018-12-27 NOTE — PROGRESS NOTES
[facility]  Rheumatology Clinic  Augustine Fields MD  2018     Name: Carolina Mora  MRN: [id number]  Age: 58 year old  : 1960  Referring provider: Michael Beltrán     Assessment and Plan:  Seropositive RA (RF 49/ACPA 205), dx 2015. Hx rheumatoid nodules (BL elbow, R hand, R foot) - resolved):    Transient L knee pain in 2018 is not likely due to flare of inflammatory joint disease. Inflammatory arthritis remains well-controlled on combination anti-TNF and methotrexate. Will continue current regimen, as below.     Plan:  - Continue methotrexate 7.5 mg per week. While on methotrexate:  --q 3 mo AST/ALT, Albumin, CBC with plts  --Limit EtOH intake to 2 drinks weekly; use folate 1 mg daily.  --Tylenol 500 mg tid prn nausea/HA associated with dosing.    - adalimumab (HUMIRA PEN) 40 MG/0.8ML pen kit every other week. Dispense: 2 each; Refill: 5  - Continue laboratory monitoring every 3-4 months; Cr, ALT, AST, CBC      Follow-up: Return in about 6 months (around 2019).     Patient seen and discussed with Dr. Donnie Sweeney MD  PGY-5 Internal Medicine/ Dermatology  [phone]    I saw this patient with the medical resident. I agree with the findings and recommendations. Transient left knee pain does not reflect inflammatory arthritis. High-risk RA remains in remission on combination Rx.    Augustine Fields M.D.  [facility]  Pager [phone]      HPI:   Carolina Mora is a 58 year old female with a history of seropositive RA who presents for follow-up. Patient was last seen on 18 at which time she reported doing great in terms of her disease process. She was active, using her hands a lot, and was not on medication for inflammation. Plan at that time was to continue subcutaneous Humira 40mg every 2 weeks and to decrease methotrexate to 3 tabs weekly.     Today, She states that she had some pain in her left knee starting about a month ago, but that has  improved (It lasted 1-2 weeks), it is still difficult to bend all the way. No trauma, no change in physical activity, is a problem that has been occurring periodically for a long time, but this time was worse than previously. She still feels discomfort on the right medial knee when she rubs that area.     Pt initially referred to rheumatology 7/2015 for 6m history of polyarticular inflammatory arthritis and hand paresthesias. Found to have +RF/CCP and started on MTX and prednisone with significant improvement. Started on Humira 1/2016 given persistent inflammatory arthritis. Has been in remission since that time. Started to titrate down on MTX from 8 to 6 tabs spring 2017 with no flare. Tapered to 7.5 mg/wk in mid-2018.    Review of Systems:   Pertinent items are noted in HPI or as below, remainder of complete ROS is negative.      No recent problems with hearing or vision. No swallowing problems.   No breathing difficulty, shortness of breath, coughing, or wheezing  No chest pain or palpitations  No heart burn, indigestion, abdominal pain, nausea, vomiting, diarrhea  No urination problems, no bloody, cloudy urine, no dysuria  No numbing, tingling, weakness  No headaches or confusion  No rashes. No easy bleeding or bruising. She did have a bruise on her left shin after significant trauma    Active Medications:     Current Outpatient Medications:      adalimumab (HUMIRA PEN) 40 MG/0.8ML pen kit, Inject 0.8 mLs (40 mg) Subcutaneous every 14 days, Disp: 2 each, Rfl: 0     folic acid (FOLVITE) 1 MG tablet, Take 2 tabs daily., Disp: 180 tablet, Rfl: 3     ibuprofen (ADVIL,MOTRIN) 600 MG tablet, Take 600 mg by mouth as needed Reported on 4/3/2017, Disp: , Rfl:      levothyroxine (SYNTHROID/LEVOTHROID) 112 MCG tablet, Take 1 tablet (112 mcg) by mouth daily, Disp: 90 tablet, Rfl: 3     methotrexate 2.5 MG tablet CHEMO, 3 tabs by mouth weekly, Disp: 36 tablet, Rfl: 0      Allergies:   Codeine and Dye [contrast dye]      Past  Medical History:  Past Medical History:   Diagnosis Date     Dysplasia of cervix 1990's    had cryotx     Hyperlipidemia LDL goal < 130 7/31/2014     Hypothyroidism      Menopause age 48     Nephrolithiasis 1/11    s/p ESWL     Rheumatoid arthritis (H) 6/15     Smoker      Ulcerative colitis (H) 1-06     Patient Active Problem List   Diagnosis     Ulcerative colitis (H)     History of nephrolithiasis     Hyperlipidemia with target LDL less than 130     Ventral hernia without obstruction or gangrene     Rheumatoid arthritis involving multiple sites with positive rheumatoid factor (H)     Encounter for long-term (current) use of high-risk medication     Hypothyroidism due to acquired atrophy of thyroid     Past Surgical History:  Past Surgical History:   Procedure Laterality Date     COLONOSCOPY  3-14-14     CRYOTHERAPY, CERVICAL  EARLY 1990's     HC REMOVAL GALLBLADDER  2-01    and repair ventral hernia     Family History:   Family History   Problem Relation Age of Onset     Breast Cancer Mother      Thyroid Disease Mother      Diabetes Maternal Grandmother      Hypertension Maternal Grandmother      Cerebrovascular Disease Maternal Grandmother      Cancer Maternal Grandmother      Hypertension Paternal Grandmother      Breast Cancer Paternal Grandmother      Cancer Paternal Grandmother      Thyroid Disease Sister      Skin Cancer Sister      Asthma Son      Melanoma No family hx of       Social History:   smoked for 30 years - quit spring 2017  EtOH use - none  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      Physical Exam:   /81   Pulse 60   Temp 98.7  F (37.1  C) (Oral)   Wt 64.1 kg (141 lb 4.8 oz)   SpO2 98%   BMI 24.83 kg/m     Wt Readings from Last 4 Encounters:   12/28/18 64.1 kg (141 lb 4.8 oz)   07/02/18 63.5 kg (140 lb)   06/28/18 64.4 kg (141 lb 14.4 oz)   01/08/18 65.6 kg (144 lb 9.6 oz)     Constitutional: well-developed, appearing stated age; cooperative  Eyes: nl EOM,  PERRLA, vision, conjunctiva, sclera  ENT: nl external ears, nose, hearing, lips, teeth, gums, throat  No mucous membrane lesions, normal saliva pool  Neck: no mass or thyroid enlargement  Resp: lungs clear to auscultation, nl to palpation  CV: RRR, no murmurs, rubs or gallops, no edema  GI: no ABD mass or tenderness, no HSM  Lymph: no cervical, supraclavicular, or epitrochlear nodes  MS: All TMJ, neck, shoulder, elbow, wrist, MCP/PIP/DIP, spine, hip, knee, ankle, and foot MTP/IP joints were examined.  - Left knee with 1+ effusion, no warmth or erythema, slightly decreased flexion with passive ROM (10 degrees)  - mild joint laxity of BL wrists   - L elbow w/ 5 deg contraction flexure w/o active synovitis (unchanged)  - L hand 3rd PIP slight contraction flexure 2/2 prior fracture. Mild laxity of 3rd MCP without synovitis.   Normal  strength. No dactylitis,  tenosynovitis, enthesopathy.  Skin: no nail pitting, alopecia, rash, lesions  Neuro: grossly nl cranial nerves, strength   Psych: nl judgement, orientation, memory, affect.    Laboratory:   Component      Latest Ref Rng & Units 12/18/2018   WBC      4.0 - 11.0 10e9/L 6.9   RBC Count      3.8 - 5.2 10e12/L 3.82   Hemoglobin      11.7 - 15.7 g/dL 12.3   Hematocrit      35.0 - 47.0 % 36.3   MCV      78 - 100 fl 95   MCH      26.5 - 33.0 pg 32.2   MCHC      31.5 - 36.5 g/dL 33.9   RDW      10.0 - 15.0 % 13.0   Platelet Count      150 - 450 10e9/L 257   Diff Method       Automated Method   % Neutrophils      % 66.1   % Lymphocytes      % 22.7   % Monocytes      % 8.5   % Eosinophils      % 2.0   % Basophils      % 0.7   Absolute Neutrophil      1.6 - 8.3 10e9/L 4.6   Absolute Lymphocytes      0.8 - 5.3 10e9/L 1.6   Absolute Monocytes      0.0 - 1.3 10e9/L 0.6   Absolute Eosinophils      0.0 - 0.7 10e9/L 0.1   Absolute Basophils      0.0 - 0.2 10e9/L 0.1   Creatinine      0.52 - 1.04 mg/dL 0.72   GFR Estimate      >60 mL/min/1.7m2 84   GFR Estimate If Black      >60  mL/min/1.7m2 >90   Albumin      3.4 - 5.0 g/dL 3.5   AST      0 - 45 U/L 15   ALT      0 - 50 U/L 18     RHEUM RESULTS Latest Ref Rng & Units 12/29/2017 6/22/2018 12/18/2018   SED RATE 0 - 30 mm/h - - -   CRP, INFLAMMATION 0.0 - 8.0 mg/L - - -   RHEUMATOID FACTOR <20 IU/mL - - -   AST 0 - 45 U/L 18 17 15   ALT 0 - 50 U/L 26 18 18   ALBUMIN 3.4 - 5.0 g/dL 3.5 3.5 3.5   WBC 4.0 - 11.0 10e9/L 7.4 6.6 6.9   RBC 3.8 - 5.2 10e12/L 3.74(L) 3.84 3.82   HGB 11.7 - 15.7 g/dL 12.4 12.7 12.3   HCT 35.0 - 47.0 % 36.1 37.1 36.3   MCV 78 - 100 fl 97 97 95   MCHC 31.5 - 36.5 g/dL 34.3 34.2 33.9   RDW 10.0 - 15.0 % 12.7 12.8 13.0    - 450 10e9/L 244 269 257   CREATININE 0.52 - 1.04 mg/dL 0.84 0.81 0.72   GFR ESTIMATE, IF BLACK >60 mL/min/1.7m2 86 89 >90   GFR ESTIMATE >60 mL/min/1.7m2 71 74 84   IGA 70 - 380 mg/dL - - -   HEPATITIS C ANTIBODY NR - - -         Rheumatoid Factor   Date Value Ref Range Status   05/28/2015 49 (H) <20 IU/mL Final   ,  ,   Cyclic Cit Pept IgG/IgA   Date Value Ref Range Status   07/06/2015 205 (H) <20 UNITS Final     Comment:     Strongly Positive   ,  ,  ,  ,  ,  ,  ,  ,  ,  ,  ,  ,  ,  ,  ,  ,  ,  ,  ,  ,  ,  ,  ,  ,  ,  ,  ,  ,  ,  ,  ,  ,  ,  ,   IGA   Date Value Ref Range Status   05/28/2015 222 70 - 380 mg/dL Final   ,  ,  ,  ,  ,  ,  ,

## 2018-12-28 ENCOUNTER — OFFICE VISIT (OUTPATIENT)
Dept: RHEUMATOLOGY | Facility: CLINIC | Age: 58
End: 2018-12-28
Attending: INTERNAL MEDICINE
Payer: COMMERCIAL

## 2018-12-28 VITALS
OXYGEN SATURATION: 98 % | HEART RATE: 60 BPM | SYSTOLIC BLOOD PRESSURE: 149 MMHG | WEIGHT: 141.3 LBS | DIASTOLIC BLOOD PRESSURE: 81 MMHG | TEMPERATURE: 98.7 F | BODY MASS INDEX: 24.83 KG/M2

## 2018-12-28 DIAGNOSIS — M05.79 RHEUMATOID ARTHRITIS INVOLVING MULTIPLE SITES WITH POSITIVE RHEUMATOID FACTOR (H): ICD-10-CM

## 2018-12-28 DIAGNOSIS — Z79.899 ENCOUNTER FOR LONG-TERM (CURRENT) USE OF HIGH-RISK MEDICATION: ICD-10-CM

## 2018-12-28 DIAGNOSIS — Z79.631 LONG TERM METHOTREXATE USER: Primary | ICD-10-CM

## 2018-12-28 DIAGNOSIS — M25.522 ELBOW PAIN, LEFT: ICD-10-CM

## 2018-12-28 PROCEDURE — G0463 HOSPITAL OUTPT CLINIC VISIT: HCPCS | Mod: ZF

## 2018-12-28 ASSESSMENT — PAIN SCALES - GENERAL: PAINLEVEL: NO PAIN (0)

## 2018-12-28 NOTE — NURSING NOTE
Chief Complaint   Patient presents with     RECHECK     RA     /81   Pulse 60   Temp 98.7  F (37.1  C) (Oral)   Wt 64.1 kg (141 lb 4.8 oz)   SpO2 98%   BMI 24.83 kg/m    Mariel Tolbert MA

## 2018-12-28 NOTE — LETTER
2018      RE: Carolina Mora  [address]  [facility] [address]       [facility]  Rheumatology Clinic  Augustine Fields MD  2018     Name: Carolina Mora  MRN: [id number]  Age: 58 year old  : 1960  Referring provider: Michael Beltrán     Assessment and Plan:  Seropositive RA (RF 49/ACPA 205), dx 2015. Hx rheumatoid nodules (BL elbow, R hand, R foot) - resolved):    Transient L knee pain in Fall 2018 is not likely due to flare of inflammatory joint disease. Inflammatory arthritis remains well-controlled on combination anti-TNF and methotrexate. Will continue current regimen, as below.     Plan:  - Continue methotrexate 7.5 mg per week. While on methotrexate:  --q 3 mo AST/ALT, Albumin, CBC with plts  --Limit EtOH intake to 2 drinks weekly; use folate 1 mg daily.  --Tylenol 500 mg tid prn nausea/HA associated with dosing.    - adalimumab (HUMIRA PEN) 40 MG/0.8ML pen kit every other week. Dispense: 2 each; Refill: 5  - Continue laboratory monitoring every 3-4 months; Cr, ALT, AST, CBC      Follow-up: Return in about 6 months (around 2019).     Patient seen and discussed with Dr. Donnie Sweeney MD  PGY-5 Internal Medicine/ Dermatology  [phone]    I saw this patient with the medical resident. I agree with the findings and recommendations. Transient left knee pain does not reflect inflammatory arthritis. High-risk RA remains in remission on combination Rx.    Augustine Fields M.D.  Staff Rheumatologist, [facility]  Pager [phone]      HPI:   Carolina Mora is a 58 year old female with a history of seropositive RA who presents for follow-up. Patient was last seen on 18 at which time she reported doing great in terms of her disease process. She was active, using her hands a lot, and was not on medication for inflammation. Plan at that time was to continue subcutaneous Humira 40mg every 2 weeks and to decrease methotrexate to 3 tabs weekly.     Today, She states  that she had some pain in her left knee starting about a month ago, but that has improved (It lasted 1-2 weeks), it is still difficult to bend all the way. No trauma, no change in physical activity, is a problem that has been occurring periodically for a long time, but this time was worse than previously. She still feels discomfort on the right medial knee when she rubs that area.     Pt initially referred to rheumatology 7/2015 for 6m history of polyarticular inflammatory arthritis and hand paresthesias. Found to have +RF/CCP and started on MTX and prednisone with significant improvement. Started on Humira 1/2016 given persistent inflammatory arthritis. Has been in remission since that time. Started to titrate down on MTX from 8 to 6 tabs spring 2017 with no flare. Tapered to 7.5 mg/wk in mid-2018.    Review of Systems:   Pertinent items are noted in HPI or as below, remainder of complete ROS is negative.      No recent problems with hearing or vision. No swallowing problems.   No breathing difficulty, shortness of breath, coughing, or wheezing  No chest pain or palpitations  No heart burn, indigestion, abdominal pain, nausea, vomiting, diarrhea  No urination problems, no bloody, cloudy urine, no dysuria  No numbing, tingling, weakness  No headaches or confusion  No rashes. No easy bleeding or bruising. She did have a bruise on her left shin after significant trauma    Active Medications:     Current Outpatient Medications:      adalimumab (HUMIRA PEN) 40 MG/0.8ML pen kit, Inject 0.8 mLs (40 mg) Subcutaneous every 14 days, Disp: 2 each, Rfl: 0     folic acid (FOLVITE) 1 MG tablet, Take 2 tabs daily., Disp: 180 tablet, Rfl: 3     ibuprofen (ADVIL,MOTRIN) 600 MG tablet, Take 600 mg by mouth as needed Reported on 4/3/2017, Disp: , Rfl:      levothyroxine (SYNTHROID/LEVOTHROID) 112 MCG tablet, Take 1 tablet (112 mcg) by mouth daily, Disp: 90 tablet, Rfl: 3     methotrexate 2.5 MG tablet CHEMO, 3 tabs by mouth weekly,  Disp: 36 tablet, Rfl: 0      Allergies:   Codeine and Dye [contrast dye]      Past Medical History:  Past Medical History:   Diagnosis Date     Dysplasia of cervix 1990's    had cryotx     Hyperlipidemia LDL goal < 130 7/31/2014     Hypothyroidism      Menopause age 48     Nephrolithiasis 1/11    s/p ESWL     Rheumatoid arthritis (H) 6/15     Smoker      Ulcerative colitis (H) 1-06     Patient Active Problem List   Diagnosis     Ulcerative colitis (H)     History of nephrolithiasis     Hyperlipidemia with target LDL less than 130     Ventral hernia without obstruction or gangrene     Rheumatoid arthritis involving multiple sites with positive rheumatoid factor (H)     Encounter for long-term (current) use of high-risk medication     Hypothyroidism due to acquired atrophy of thyroid     Past Surgical History:  Past Surgical History:   Procedure Laterality Date     COLONOSCOPY  3-14-14     CRYOTHERAPY, CERVICAL  EARLY 1990's     HC REMOVAL GALLBLADDER  2-01    and repair ventral hernia     Family History:   Family History   Problem Relation Age of Onset     Breast Cancer Mother      Thyroid Disease Mother      Diabetes Maternal Grandmother      Hypertension Maternal Grandmother      Cerebrovascular Disease Maternal Grandmother      Cancer Maternal Grandmother      Hypertension Paternal Grandmother      Breast Cancer Paternal Grandmother      Cancer Paternal Grandmother      Thyroid Disease Sister      Skin Cancer Sister      Asthma Son      Melanoma No family hx of       Social History:   smoked for 30 years - quit spring 2017  EtOH use - none  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      Physical Exam:   /81   Pulse 60   Temp 98.7  F (37.1  C) (Oral)   Wt 64.1 kg (141 lb 4.8 oz)   SpO2 98%   BMI 24.83 kg/m      Wt Readings from Last 4 Encounters:   12/28/18 64.1 kg (141 lb 4.8 oz)   07/02/18 63.5 kg (140 lb)   06/28/18 64.4 kg (141 lb 14.4 oz)   01/08/18 65.6 kg (144 lb 9.6 oz)      Constitutional: well-developed, appearing stated age; cooperative  Eyes: nl EOM, PERRLA, vision, conjunctiva, sclera  ENT: nl external ears, nose, hearing, lips, teeth, gums, throat  No mucous membrane lesions, normal saliva pool  Neck: no mass or thyroid enlargement  Resp: lungs clear to auscultation, nl to palpation  CV: RRR, no murmurs, rubs or gallops, no edema  GI: no ABD mass or tenderness, no HSM  Lymph: no cervical, supraclavicular, or epitrochlear nodes  MS: All TMJ, neck, shoulder, elbow, wrist, MCP/PIP/DIP, spine, hip, knee, ankle, and foot MTP/IP joints were examined.  - Left knee with 1+ effusion, no warmth or erythema, slightly decreased flexion with passive ROM (10 degrees)  - mild joint laxity of BL wrists   - L elbow w/ 5 deg contraction flexure w/o active synovitis (unchanged)  - L hand 3rd PIP slight contraction flexure 2/2 prior fracture. Mild laxity of 3rd MCP without synovitis.   Normal  strength. No dactylitis,  tenosynovitis, enthesopathy.  Skin: no nail pitting, alopecia, rash, lesions  Neuro: grossly nl cranial nerves, strength   Psych: nl judgement, orientation, memory, affect.    Laboratory:   Component      Latest Ref Rng & Units 12/18/2018   WBC      4.0 - 11.0 10e9/L 6.9   RBC Count      3.8 - 5.2 10e12/L 3.82   Hemoglobin      11.7 - 15.7 g/dL 12.3   Hematocrit      35.0 - 47.0 % 36.3   MCV      78 - 100 fl 95   MCH      26.5 - 33.0 pg 32.2   MCHC      31.5 - 36.5 g/dL 33.9   RDW      10.0 - 15.0 % 13.0   Platelet Count      150 - 450 10e9/L 257   Diff Method       Automated Method   % Neutrophils      % 66.1   % Lymphocytes      % 22.7   % Monocytes      % 8.5   % Eosinophils      % 2.0   % Basophils      % 0.7   Absolute Neutrophil      1.6 - 8.3 10e9/L 4.6   Absolute Lymphocytes      0.8 - 5.3 10e9/L 1.6   Absolute Monocytes      0.0 - 1.3 10e9/L 0.6   Absolute Eosinophils      0.0 - 0.7 10e9/L 0.1   Absolute Basophils      0.0 - 0.2 10e9/L 0.1   Creatinine      0.52 - 1.04  mg/dL 0.72   GFR Estimate      >60 mL/min/1.7m2 84   GFR Estimate If Black      >60 mL/min/1.7m2 >90   Albumin      3.4 - 5.0 g/dL 3.5   AST      0 - 45 U/L 15   ALT      0 - 50 U/L 18     RHEUM RESULTS Latest Ref Rng & Units 12/29/2017 6/22/2018 12/18/2018   SED RATE 0 - 30 mm/h - - -   CRP, INFLAMMATION 0.0 - 8.0 mg/L - - -   RHEUMATOID FACTOR <20 IU/mL - - -   AST 0 - 45 U/L 18 17 15   ALT 0 - 50 U/L 26 18 18   ALBUMIN 3.4 - 5.0 g/dL 3.5 3.5 3.5   WBC 4.0 - 11.0 10e9/L 7.4 6.6 6.9   RBC 3.8 - 5.2 10e12/L 3.74(L) 3.84 3.82   HGB 11.7 - 15.7 g/dL 12.4 12.7 12.3   HCT 35.0 - 47.0 % 36.1 37.1 36.3   MCV 78 - 100 fl 97 97 95   MCHC 31.5 - 36.5 g/dL 34.3 34.2 33.9   RDW 10.0 - 15.0 % 12.7 12.8 13.0    - 450 10e9/L 244 269 257   CREATININE 0.52 - 1.04 mg/dL 0.84 0.81 0.72   GFR ESTIMATE, IF BLACK >60 mL/min/1.7m2 86 89 >90   GFR ESTIMATE >60 mL/min/1.7m2 71 74 84   IGA 70 - 380 mg/dL - - -   HEPATITIS C ANTIBODY NR - - -         Rheumatoid Factor   Date Value Ref Range Status   05/28/2015 49 (H) <20 IU/mL Final   ,  ,   Cyclic Cit Pept IgG/IgA   Date Value Ref Range Status   07/06/2015 205 (H) <20 UNITS Final     Comment:     Strongly Positive   ,  ,  ,  ,  ,  ,  ,  ,  ,  ,  ,  ,  ,  ,  ,  ,  ,  ,  ,  ,  ,  ,  ,  ,  ,  ,  ,  ,  ,  ,  ,  ,  ,  ,   IGA   Date Value Ref Range Status   05/28/2015 222 70 - 380 mg/dL Final   ,  ,  ,  ,  ,  ,  ,        Augustine Fields MD

## 2019-01-18 ENCOUNTER — TELEPHONE (OUTPATIENT)
Dept: RHEUMATOLOGY | Facility: CLINIC | Age: 59
End: 2019-01-18

## 2019-01-18 DIAGNOSIS — Z79.899 ENCOUNTER FOR LONG-TERM (CURRENT) USE OF HIGH-RISK MEDICATION: ICD-10-CM

## 2019-01-18 DIAGNOSIS — M05.79 RHEUMATOID ARTHRITIS INVOLVING MULTIPLE SITES WITH POSITIVE RHEUMATOID FACTOR (H): ICD-10-CM

## 2019-01-18 DIAGNOSIS — M25.522 ELBOW PAIN, LEFT: ICD-10-CM

## 2019-01-18 NOTE — TELEPHONE ENCOUNTER
[facility] Call Center    Phone Message    May a detailed message be left on voicemail: yes    Reason for Call: Medication Question or concern regarding medication   Prescription Clarification  Name of Medication: Methotrexate and Humira   Prescribing Provider: Donnie   Pharmacy: [facility]    What on the order needs clarification? Needs new RX for both medications.           Action Taken: Message routed to:  [facility] Rheum

## 2019-01-21 ENCOUNTER — ANCILLARY PROCEDURE (OUTPATIENT)
Dept: MAMMOGRAPHY | Facility: CLINIC | Age: 59
End: 2019-01-21
Payer: COMMERCIAL

## 2019-01-21 DIAGNOSIS — R92.8 ABNORMAL MAMMOGRAM OF LEFT BREAST: ICD-10-CM

## 2019-01-21 DIAGNOSIS — Z09 FOLLOW UP: ICD-10-CM

## 2019-01-21 DIAGNOSIS — R92.8 CATEGORY 3 MAMMOGRAPHY RESULT WITH SHORT FOLLOW-UP INTERVAL SUGGESTED FOR PROBABLY BENIGN FINDING: ICD-10-CM

## 2019-01-21 NOTE — TELEPHONE ENCOUNTER
Methotrexate and Humira       Last Written Prescription Date:  12-28-18    Pharmacy change - remaining refills sent to pharmacy.      Kathleen M Doege RN

## 2019-02-11 ENCOUNTER — TELEPHONE (OUTPATIENT)
Dept: RHEUMATOLOGY | Facility: CLINIC | Age: 59
End: 2019-02-11

## 2019-02-11 DIAGNOSIS — M05.79 RHEUMATOID ARTHRITIS INVOLVING MULTIPLE SITES WITH POSITIVE RHEUMATOID FACTOR (H): ICD-10-CM

## 2019-02-11 DIAGNOSIS — M25.522 ELBOW PAIN, LEFT: ICD-10-CM

## 2019-02-11 DIAGNOSIS — Z79.899 ENCOUNTER FOR LONG-TERM (CURRENT) USE OF HIGH-RISK MEDICATION: ICD-10-CM

## 2019-02-11 NOTE — TELEPHONE ENCOUNTER
RX: adalimumab (HUMIRA PEN) 40 MG/0.8ML pen kit.   Pt has a new pharmacy and is having trouble transferring prescription.  Therefore, please have Dr. Fields submit a new RX to [facility] - phone number [phone].  Please follow up with pt, too.  Thank you!

## 2019-02-12 NOTE — TELEPHONE ENCOUNTER
Humira CF pen ordered per standing orders and sent to the [facility] specialty pharmacy. Left message on pt's voice mail that this has been done.    PHONG HernándezN RN  Rheumatology RN Coordinator  [facility]

## 2019-02-13 ENCOUNTER — TELEPHONE (OUTPATIENT)
Dept: RHEUMATOLOGY | Facility: CLINIC | Age: 59
End: 2019-02-13

## 2019-02-13 NOTE — TELEPHONE ENCOUNTER
Pharmacy received 2/13/19.    Mallory Loja MSN, RN  Rheumatology RN Care Coordinator  [facility]

## 2019-02-13 NOTE — TELEPHONE ENCOUNTER
[facility] Call Center    Phone Message    May a detailed message be left on voicemail: yes    Reason for Call: Other: Carolina calling to say that the pharmacy states they never recieved the refill prescription and is requesting that it be resent to them.  Please call her with any questions or concerns     Action Taken: Message routed to:  [facility]

## 2019-02-22 ENCOUNTER — TELEPHONE (OUTPATIENT)
Dept: RHEUMATOLOGY | Facility: CLINIC | Age: 59
End: 2019-02-22

## 2019-02-22 NOTE — TELEPHONE ENCOUNTER
Contacted Carolina to let her know that PA was approved for her Humira.   She was so relieved to hear.     Mallory Loja MSN, RN  Rheumatology RN Care Coordinator   [facility]

## 2019-02-22 NOTE — TELEPHONE ENCOUNTER
Prior Authorization Approval    Authorization Effective Date:  TBD  Authorization Expiration Date:  TBD  Medication: Humira Refill  Approved Dose/Quantity: 2 FOR 28 DAYS  Reference #: [id number]   Insurance Company: [employer] [phone] Fax [phone]  Expected CoPay:       CoPay Card Available:      Foundation Assistance Needed:    Which Pharmacy is filling the prescription (Not needed for infusion/clinic administered): [facility]  Pharmacy Notified: Yes - via patient   Patient Notified: Yes - via phone call

## 2019-02-22 NOTE — TELEPHONE ENCOUNTER
Talked to Carolina and she states that [facility] pharmacy stated that her Humira now is requiring a PA in order for her to fill this medication.  She is due for an injection tomorrow 2/23/2019 and has no Humira.   Carolina is very frustrated with [facility] because this was the first time she was told that a PA needs to be submitted.    I told her I would forward this request to the PA team and start this process ASAP.    We will be in contact with Carolina and let her know as soon as anything is heard regarding the PA.    Mallory Loja MSN, RN  Rheumatology RN Care Coordinator  [facility]

## 2019-02-22 NOTE — TELEPHONE ENCOUNTER
[facility] Call Center    Phone Message    May a detailed message be left on voicemail: yes    Reason for Call: Other: Carolina calling to say that her insurance is now requiring a PA for the Humira.  She is requesting a call to the [facility] Prior Auth Department and provide clinic information.  Their number is [phone], press option 1, then option 4.  She is requesting this be done as soon as possible as she is supposed to have her shot tomorrow, but is out of the medication.     Action Taken: Message routed to:  [facility]

## 2019-03-31 DIAGNOSIS — M25.522 ELBOW PAIN, LEFT: ICD-10-CM

## 2019-03-31 DIAGNOSIS — M05.79 RHEUMATOID ARTHRITIS INVOLVING MULTIPLE SITES WITH POSITIVE RHEUMATOID FACTOR (H): ICD-10-CM

## 2019-03-31 DIAGNOSIS — Z79.899 ENCOUNTER FOR LONG-TERM (CURRENT) USE OF HIGH-RISK MEDICATION: ICD-10-CM

## 2019-04-01 RX ORDER — FOLIC ACID 1 MG/1
2 TABLET ORAL DAILY
Qty: 180 TABLET | Refills: 2 | Status: SHIPPED | OUTPATIENT
Start: 2019-04-01 | End: 2019-06-14

## 2019-05-02 DIAGNOSIS — E03.4 HYPOTHYROIDISM DUE TO ACQUIRED ATROPHY OF THYROID: ICD-10-CM

## 2019-05-02 NOTE — TELEPHONE ENCOUNTER
"Requested Prescriptions   Pending Prescriptions Disp Refills     levothyroxine (SYNTHROID/LEVOTHROID) 112 MCG tablet [Pharmacy Med Name: LEVOTHYROXINE 112 MCG TABLET] 90 tablet 3     Sig: TAKE 1 TABLET BY MOUTH DAILY   Last Written Prescription Date:  7-2-18  Last Fill Quantity: 90,  # refills: 3   Last office visit: 7/2/2018 with prescribing provider:  7-2-18   Future Office Visit:        Thyroid Protocol Passed - 5/2/2019  8:39 AM        Passed - Patient is 12 years or older        Passed - Recent (12 mo) or future (30 days) visit within the authorizing provider's specialty     Patient had office visit in the last 12 months or has a visit in the next 30 days with authorizing provider or within the authorizing provider's specialty.  See \"Patient Info\" tab in inbasket, or \"Choose Columns\" in Meds & Orders section of the refill encounter.              Passed - Medication is active on med list        Passed - Normal TSH on file in past 12 months     Recent Labs   Lab Test 07/02/18  1137   TSH 1.25              Passed - No active pregnancy on record     If patient is pregnant or has had a positive pregnancy test, please check TSH.          Passed - No positive pregnancy test in past 12 months     If patient is pregnant or has had a positive pregnancy test, please check TSH.            "

## 2019-05-03 RX ORDER — LEVOTHYROXINE SODIUM 112 UG/1
TABLET ORAL
Qty: 90 TABLET | Refills: 0 | Status: SHIPPED | OUTPATIENT
Start: 2019-05-03 | End: 2019-08-10

## 2019-06-06 DIAGNOSIS — Z79.631 LONG TERM METHOTREXATE USER: ICD-10-CM

## 2019-06-06 LAB
ERYTHROCYTE [DISTWIDTH] IN BLOOD BY AUTOMATED COUNT: 12.9 % (ref 10–15)
HCT VFR BLD AUTO: 37.2 % (ref 35–47)
HGB BLD-MCNC: 12.6 G/DL (ref 11.7–15.7)
MCH RBC QN AUTO: 32.3 PG (ref 26.5–33)
MCHC RBC AUTO-ENTMCNC: 33.9 G/DL (ref 31.5–36.5)
MCV RBC AUTO: 95 FL (ref 78–100)
PLATELET # BLD AUTO: 274 10E9/L (ref 150–450)
RBC # BLD AUTO: 3.9 10E12/L (ref 3.8–5.2)
WBC # BLD AUTO: 7.9 10E9/L (ref 4–11)

## 2019-06-06 PROCEDURE — 85027 COMPLETE CBC AUTOMATED: CPT | Performed by: FAMILY MEDICINE

## 2019-06-06 PROCEDURE — 84460 ALANINE AMINO (ALT) (SGPT): CPT | Performed by: FAMILY MEDICINE

## 2019-06-06 PROCEDURE — 82565 ASSAY OF CREATININE: CPT | Performed by: FAMILY MEDICINE

## 2019-06-06 PROCEDURE — 84450 TRANSFERASE (AST) (SGOT): CPT | Performed by: FAMILY MEDICINE

## 2019-06-06 PROCEDURE — 36415 COLL VENOUS BLD VENIPUNCTURE: CPT | Performed by: FAMILY MEDICINE

## 2019-06-07 ENCOUNTER — TELEPHONE (OUTPATIENT)
Dept: FAMILY MEDICINE | Facility: CLINIC | Age: 59
End: 2019-06-07

## 2019-06-07 LAB
ALT SERPL W P-5'-P-CCNC: 17 U/L (ref 0–50)
AST SERPL W P-5'-P-CCNC: 16 U/L (ref 0–45)
CREAT SERPL-MCNC: 0.87 MG/DL (ref 0.52–1.04)
GFR SERPL CREATININE-BSD FRML MDRD: 74 ML/MIN/{1.73_M2}

## 2019-06-13 ENCOUNTER — PRE VISIT (OUTPATIENT)
Dept: RHEUMATOLOGY | Facility: CLINIC | Age: 59
End: 2019-06-13

## 2019-06-13 NOTE — TELEPHONE ENCOUNTER
Left message for pt reminding them of upcoming appointment.  Instructed pt to bring updated medications list.  Roxana Horta, CMA

## 2019-06-14 ENCOUNTER — OFFICE VISIT (OUTPATIENT)
Dept: RHEUMATOLOGY | Facility: CLINIC | Age: 59
End: 2019-06-14
Attending: INTERNAL MEDICINE
Payer: COMMERCIAL

## 2019-06-14 VITALS
TEMPERATURE: 98.2 F | WEIGHT: 140.4 LBS | SYSTOLIC BLOOD PRESSURE: 130 MMHG | HEIGHT: 63 IN | HEART RATE: 59 BPM | OXYGEN SATURATION: 99 % | DIASTOLIC BLOOD PRESSURE: 83 MMHG | BODY MASS INDEX: 24.88 KG/M2

## 2019-06-14 DIAGNOSIS — M25.522 ELBOW PAIN, LEFT: ICD-10-CM

## 2019-06-14 DIAGNOSIS — Z79.899 ENCOUNTER FOR LONG-TERM (CURRENT) USE OF HIGH-RISK MEDICATION: ICD-10-CM

## 2019-06-14 DIAGNOSIS — M05.79 RHEUMATOID ARTHRITIS INVOLVING MULTIPLE SITES WITH POSITIVE RHEUMATOID FACTOR (H): ICD-10-CM

## 2019-06-14 PROCEDURE — G0463 HOSPITAL OUTPT CLINIC VISIT: HCPCS | Mod: ZF

## 2019-06-14 RX ORDER — FOLIC ACID 1 MG/1
2 TABLET ORAL DAILY
Qty: 180 TABLET | Refills: 5 | Status: SHIPPED | OUTPATIENT
Start: 2019-06-14 | End: 2019-10-14

## 2019-06-14 ASSESSMENT — PAIN SCALES - GENERAL: PAINLEVEL: NO PAIN (0)

## 2019-06-14 ASSESSMENT — MIFFLIN-ST. JEOR: SCORE: 1199.94

## 2019-06-14 NOTE — PATIENT INSTRUCTIONS
Dx:  Rheumatoid arthritis, quiescent    Plan:  1. Continue humira and methotrexate  2. Reduce humira to every 3 weeks for 3 cycles, then report on whether symptoms are increasing.  3. Bllood work every 3 weeks.    Follow up with Joe Medina.

## 2019-06-14 NOTE — PROGRESS NOTES
[facility]  Rheumatology Clinic  Augustine Fields MD  2019     Name: Carolina Mora  MRN: [id number]  Age: 58 year old  : 1960  Referring provider: Michael Beltrán     Assessment and Plan:  Seropositive RA (RF 49/ACPA 205), dx 2015. Hx rheumatoid nodules):     Patient relates continued freedom from joint pain, stiffness and swelling. Exam shows only chronic left elbow contracture and no inflammatory changes. Labs are unremarkable in 2019; CBC, LFT were all negative or normal. Rheumatoid arthritis is well controlled. I recommend continuing methotrexate 7.5mg weekly. I recommend a trial of reducing the frequency of Humira from every two weeks to every three weeks. I asked the patient to monitor for dosing cycle symptoms of stiffness and pain and to report any symptoms of stiffness to our office. If symptoms continue well controlled, she may continue Humira every 21 days until follow up .    Plan:  - Continue methotrexate 7.5 mg per week. While on methotrexate:  --q 3 mo AST/ALT, Albumin, CBC with plts  --Limit EtOH intake to 2 drinks weekly; use folate 1 mg daily.  --Tylenol 500 mg tid prn nausea/HA associated with dosing.    - adalimumab (HUMIRA PEN) 40 MG/0.8ML pen kit every other week.  - Continue laboratory monitoring every 3-4 months; Cr, ALT, AST, CBC      Follow-up: Follow up with Joe Rodriguez in four to five months.      HPI:   Carolina Mora is a 58 year old female with a history of seropositive RA who presents for follow-up. Patient was last seen on 18 at which time she reported doing great in terms of her disease process. She was active, using her hands a lot, and was not on medication for inflammation. Plan at that time was to continue subcutaneous Humira 40mg every 2 weeks and methotrexate 7.5 mg weekly.     Today, she denies visible swelling in her joints. The right knee which was previously swollen had resolved in February. If she does notice symptoms, they emerge quickly are  resolved quickly. She has morning stiffness in her feet when the weather is more humid and is resolved within a few steps.     She takes Humira every other week. She denies injection site reactions. She reports that she was off of Humira for a month due to insurance changes. She did not notice any negative immediate changes or adverse effects from missing two doses of Humira. She has not noticed sores in the mouth or nose associated with methotrexate use.     She used to have nodules during the early onset of her rheumatoid arthritis. She walks 30 to 60 minutes daily. She denies alcohol use.     Pt initially referred to rheumatology 7/2015 for 6m history of polyarticular inflammatory arthritis and hand paresthesias. Found to have +RF/CCP and started on MTX and prednisone with significant improvement. Started on Humira 1/2016 given persistent inflammatory arthritis. Started to titrate down on MTX from 8 to 6 tabs spring 2017 with no flare. Tapered to 7.5 mg/wk in mid-2018. Humira frequency reduced in 2019.    Review of Systems:   Pertinent items are noted in HPI or as below, remainder of complete ROS is negative.      No recent problems with hearing or vision. No swallowing problems.   No breathing difficulty, shortness of breath, coughing, or wheezing  No chest pain or palpitations  No heart burn, indigestion, abdominal pain, nausea, vomiting, diarrhea  No urination problems, no bloody, cloudy urine, no dysuria  No numbing, tingling, weakness  No headaches or confusion  No rashes. No easy bleeding or bruising.     Active Medications:   Current Outpatient Medications:      adalimumab (HUMIRA PEN) 40 MG/0.8ML pen kit, Inject 0.8 mLs (40 mg) Subcutaneous every 14 days, Disp: 2 each, Rfl: 0     folic acid (FOLVITE) 1 MG tablet, Take 2 tabs daily., Disp: 180 tablet, Rfl: 3     ibuprofen (ADVIL,MOTRIN) 600 MG tablet, Take 600 mg by mouth as needed Reported on 4/3/2017, Disp: , Rfl:      levothyroxine  "(SYNTHROID/LEVOTHROID) 112 MCG tablet, Take 1 tablet (112 mcg) by mouth daily, Disp: 90 tablet, Rfl: 3     methotrexate 2.5 MG tablet CHEMO, 3 tabs by mouth weekly, Disp: 36 tablet, Rfl: 0      Allergies:   Codeine and Dye [contrast dye]      Past Medical History:  Dysplasia of cervix (had cryotx) 1990's  Hypothyroidism (07/31/2014)  Menopause  Nephrolithiasis (s/p ESWL) (01/11)  Rheumatoid arthritis   Ulcerative colitis      Past Surgical History:  Colonoscopy (03/14/14)  Cryothearpy, cervical (early 1990's)  HC removal Gallbladder (and repair ventral hernia)(2/01)    Family History:   Mother- Breast Cancer, Thyroid Disease  Maternal Grandmother- Diabetes, Hypertension, Cerebrovascular disease , Cancer,   Paternal Grandmother- Hypertension, Breast Cancer  Sister- Thyroid disease, Skin cancer  Son - asthma   No family history of Melanoma     Social History:   smoked for 30 years - quit spring 2017  EtOH use - none  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      Physical Exam:   /83 (BP Location: Right arm, Patient Position: Sitting, Cuff Size: Adult Regular)   Pulse 59   Temp 98.2  F (36.8  C) (Oral)   Ht 1.607 m (5' 3.25\")   Wt 63.7 kg (140 lb 6.4 oz)   SpO2 99%   BMI 24.67 kg/m     Wt Readings from Last 4 Encounters:   06/14/19 63.7 kg (140 lb 6.4 oz)   12/28/18 64.1 kg (141 lb 4.8 oz)   07/02/18 63.5 kg (140 lb)   06/28/18 64.4 kg (141 lb 14.4 oz)     Constitutional: well-developed, appearing stated age; cooperative  Eyes: nl EOM, PERRLA, vision, conjunctiva, sclera  ENT: nl external ears, nose, hearing, lips, teeth, gums, throat  No mucous membrane lesions, normal saliva pool  Neck: no mass or thyroid enlargement  Resp: lungs clear to auscultation, nl to palpation  CV: RRR, no murmurs, rubs or gallops, no edema  GI: no ABD mass or tenderness, no HSM  Lymph: no cervical, supraclavicular, or epitrochlear nodes  MS: All TMJ, neck, shoulder, elbow, wrist, MCP/PIP/DIP, spine, hip, " knee, ankle, and foot MTP/IP joints were examined.  - mild joint laxity of BL wrists   - L hand 3rd PIP slight contraction flexure 2/2 prior fracture. Mild laxity of 3rd MCP without synovitis.   - Left elbow shows 10 degrees of extension but full flexion, right elbow is normal.    - L elbow w/ 5 deg contraction flexure w/o active synovitis (unchanged)  - Left knee is cool without effusion.   - Bony enlargement at first MTP at both sides.   Normal  strength. No dactylitis,  tenosynovitis, enthesopathy.  Skin: no nail pitting, alopecia, rash, lesions  Neuro: grossly nl cranial nerves, strength   Psych: nl judgement, orientation, memory, affect.    Laboratory:   Component      Latest Ref Rng & Units 6/6/2019           6:06 PM   WBC      4.0 - 11.0 10e9/L 7.9   RBC Count      3.8 - 5.2 10e12/L 3.90   Hemoglobin      11.7 - 15.7 g/dL 12.6   Hematocrit      35.0 - 47.0 % 37.2   MCV      78 - 100 fl 95   MCH      26.5 - 33.0 pg 32.3   MCHC      31.5 - 36.5 g/dL 33.9   RDW      10.0 - 15.0 % 12.9   Platelet Count      150 - 450 10e9/L 274     Component      Latest Ref Rng & Units 6/6/2019           6:06 PM   AST      0 - 45 U/L 16     Component      Latest Ref Rng & Units 6/6/2019           6:06 PM   ALT      0 - 50 U/L 17     Component      Latest Ref Rng & Units 6/6/2019           6:06 PM   Creatinine      0.52 - 1.04 mg/dL 0.87   GFR Estimate      >60 mL/min/1.73:m2 74   GFR Estimate If Black      >60 mL/min/1.73:m2 86     RHEUM RESULTS Latest Ref Rng & Units 6/22/2018 12/18/2018 6/6/2019   SED RATE 0 - 30 mm/h - - -   CRP, INFLAMMATION 0.0 - 8.0 mg/L - - -   RHEUMATOID FACTOR <20 IU/mL - - -   AST 0 - 45 U/L 17 15 16   ALT 0 - 50 U/L 18 18 17   ALBUMIN 3.4 - 5.0 g/dL 3.5 3.5 -   WBC 4.0 - 11.0 10e9/L 6.6 6.9 7.9   RBC 3.8 - 5.2 10e12/L 3.84 3.82 3.90   HGB 11.7 - 15.7 g/dL 12.7 12.3 12.6   HCT 35.0 - 47.0 % 37.1 36.3 37.2   MCV 78 - 100 fl 97 95 95   MCHC 31.5 - 36.5 g/dL 34.2 33.9 33.9   RDW 10.0 - 15.0 % 12.8 13.0  12.9    - 450 10e9/L 269 257 274   CREATININE 0.52 - 1.04 mg/dL 0.81 0.72 0.87   GFR ESTIMATE, IF BLACK >60 mL/min/[1.73:m2] 89 >90 86   GFR ESTIMATE >60 mL/min/[1.73:m2] 74 84 74   IGA 70 - 380 mg/dL - - -   HEPATITIS C ANTIBODY NR - - -     Rheumatoid Factor   Date Value Ref Range Status   05/28/2015 49 (H) <20 IU/mL Final      Cyclic Cit Pept IgG/IgA   Date Value Ref Range Status   07/06/2015 205 (H) <20 UNITS Final     Comment:     Strongly Positive     IGA   Date Value Ref Range Status   05/28/2015 222 70 - 380 mg/dL Final     Scribe Disclosure:  IGabbi, am serving as a scribe to document services personally performed by Augustine Fields MD at this visit, based upon the provider's statements to me. All documentation has been reviewed by the aforementioned provider prior to being entered into the official medical record.

## 2019-06-14 NOTE — LETTER
2019      RE: Carolina Mora  [address]  [facility] [address]       [facility]  Rheumatology Clinic  Augustine Fields MD  2019     Name: Carolina Mora  MRN: [id number]  Age: 58 year old  : 1960  Referring provider: Michael Beltrán     Assessment and Plan:  Seropositive RA (RF 49/ACPA 205), dx 2015. Hx rheumatoid nodules):     Patient relates continued freedom from joint pain, stiffness and swelling. Exam shows only chronic left elbow contracture and no inflammatory changes. Labs are unremarkable in 2019; CBC, LFT were all negative or normal. Rheumatoid arthritis is well controlled. I recommend continuing methotrexate 7.5mg weekly. I recommend a trial of reducing the frequency of Humira from every two weeks to every three weeks. I asked the patient to monitor for dosing cycle symptoms of stiffness and pain and to report any symptoms of stiffness to our office. If symptoms continue well controlled, she may continue Humira every 21 days until follow up .    Plan:  - Continue methotrexate 7.5 mg per week. While on methotrexate:  --q 3 mo AST/ALT, Albumin, CBC with plts  --Limit EtOH intake to 2 drinks weekly; use folate 1 mg daily.  --Tylenol 500 mg tid prn nausea/HA associated with dosing.    - adalimumab (HUMIRA PEN) 40 MG/0.8ML pen kit every other week.  - Continue laboratory monitoring every 3-4 months; Cr, ALT, AST, CBC      Follow-up: Follow up with Joe Rodriguez in four to five months.      HPI:   Carolina Mora is a 58 year old female with a history of seropositive RA who presents for follow-up. Patient was last seen on 18 at which time she reported doing great in terms of her disease process. She was active, using her hands a lot, and was not on medication for inflammation. Plan at that time was to continue subcutaneous Humira 40mg every 2 weeks and methotrexate 7.5 mg weekly.     Today, she denies visible swelling in her joints. The right knee which was previously  swollen had resolved in February. If she does notice symptoms, they emerge quickly are resolved quickly. She has morning stiffness in her feet when the weather is more humid and is resolved within a few steps.     She takes Humira every other week. She denies injection site reactions. She reports that she was off of Humira for a month due to insurance changes. She did not notice any negative immediate changes or adverse effects from missing two doses of Humira. She has not noticed sores in the mouth or nose associated with methotrexate use.     She used to have nodules during the early onset of her rheumatoid arthritis. She walks 30 to 60 minutes daily. She denies alcohol use.     Pt initially referred to rheumatology 7/2015 for 6m history of polyarticular inflammatory arthritis and hand paresthesias. Found to have +RF/CCP and started on MTX and prednisone with significant improvement. Started on Humira 1/2016 given persistent inflammatory arthritis. Started to titrate down on MTX from 8 to 6 tabs spring 2017 with no flare. Tapered to 7.5 mg/wk in mid-2018. Humira frequency reduced in 2019.    Review of Systems:   Pertinent items are noted in HPI or as below, remainder of complete ROS is negative.      No recent problems with hearing or vision. No swallowing problems.   No breathing difficulty, shortness of breath, coughing, or wheezing  No chest pain or palpitations  No heart burn, indigestion, abdominal pain, nausea, vomiting, diarrhea  No urination problems, no bloody, cloudy urine, no dysuria  No numbing, tingling, weakness  No headaches or confusion  No rashes. No easy bleeding or bruising.     Active Medications:   Current Outpatient Medications:      adalimumab (HUMIRA PEN) 40 MG/0.8ML pen kit, Inject 0.8 mLs (40 mg) Subcutaneous every 14 days, Disp: 2 each, Rfl: 0     folic acid (FOLVITE) 1 MG tablet, Take 2 tabs daily., Disp: 180 tablet, Rfl: 3     ibuprofen (ADVIL,MOTRIN) 600 MG tablet, Take 600 mg by  "mouth as needed Reported on 4/3/2017, Disp: , Rfl:      levothyroxine (SYNTHROID/LEVOTHROID) 112 MCG tablet, Take 1 tablet (112 mcg) by mouth daily, Disp: 90 tablet, Rfl: 3     methotrexate 2.5 MG tablet CHEMO, 3 tabs by mouth weekly, Disp: 36 tablet, Rfl: 0      Allergies:   Codeine and Dye [contrast dye]      Past Medical History:  Dysplasia of cervix (had cryotx) 1990's  Hypothyroidism (07/31/2014)  Menopause  Nephrolithiasis (s/p ESWL) (01/11)  Rheumatoid arthritis   Ulcerative colitis      Past Surgical History:  Colonoscopy (03/14/14)  Cryothearpy, cervical (early 1990's)  HC removal Gallbladder (and repair ventral hernia)(2/01)    Family History:   Mother- Breast Cancer, Thyroid Disease  Maternal Grandmother- Diabetes, Hypertension, Cerebrovascular disease , Cancer,   Paternal Grandmother- Hypertension, Breast Cancer  Sister- Thyroid disease, Skin cancer  Son - asthma   No family history of Melanoma     Social History:   smoked for 30 years - quit spring 2017  EtOH use - none  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      Physical Exam:   /83 (BP Location: Right arm, Patient Position: Sitting, Cuff Size: Adult Regular)   Pulse 59   Temp 98.2  F (36.8  C) (Oral)   Ht 1.607 m (5' 3.25\")   Wt 63.7 kg (140 lb 6.4 oz)   SpO2 99%   BMI 24.67 kg/m      Wt Readings from Last 4 Encounters:   06/14/19 63.7 kg (140 lb 6.4 oz)   12/28/18 64.1 kg (141 lb 4.8 oz)   07/02/18 63.5 kg (140 lb)   06/28/18 64.4 kg (141 lb 14.4 oz)     Constitutional: well-developed, appearing stated age; cooperative  Eyes: nl EOM, PERRLA, vision, conjunctiva, sclera  ENT: nl external ears, nose, hearing, lips, teeth, gums, throat  No mucous membrane lesions, normal saliva pool  Neck: no mass or thyroid enlargement  Resp: lungs clear to auscultation, nl to palpation  CV: RRR, no murmurs, rubs or gallops, no edema  GI: no ABD mass or tenderness, no HSM  Lymph: no cervical, supraclavicular, or epitrochlear " nodes  MS: All TMJ, neck, shoulder, elbow, wrist, MCP/PIP/DIP, spine, hip, knee, ankle, and foot MTP/IP joints were examined.  - mild joint laxity of BL wrists   - L hand 3rd PIP slight contraction flexure 2/2 prior fracture. Mild laxity of 3rd MCP without synovitis.   - Left elbow shows 10 degrees of extension but full flexion, right elbow is normal.    - L elbow w/ 5 deg contraction flexure w/o active synovitis (unchanged)  - Left knee is cool without effusion.   - Bony enlargement at first MTP at both sides.   Normal  strength. No dactylitis,  tenosynovitis, enthesopathy.  Skin: no nail pitting, alopecia, rash, lesions  Neuro: grossly nl cranial nerves, strength   Psych: nl judgement, orientation, memory, affect.    Laboratory:   Component      Latest Ref Rng & Units 6/6/2019           6:06 PM   WBC      4.0 - 11.0 10e9/L 7.9   RBC Count      3.8 - 5.2 10e12/L 3.90   Hemoglobin      11.7 - 15.7 g/dL 12.6   Hematocrit      35.0 - 47.0 % 37.2   MCV      78 - 100 fl 95   MCH      26.5 - 33.0 pg 32.3   MCHC      31.5 - 36.5 g/dL 33.9   RDW      10.0 - 15.0 % 12.9   Platelet Count      150 - 450 10e9/L 274     Component      Latest Ref Rng & Units 6/6/2019           6:06 PM   AST      0 - 45 U/L 16     Component      Latest Ref Rng & Units 6/6/2019           6:06 PM   ALT      0 - 50 U/L 17     Component      Latest Ref Rng & Units 6/6/2019           6:06 PM   Creatinine      0.52 - 1.04 mg/dL 0.87   GFR Estimate      >60 mL/min/1.73:m2 74   GFR Estimate If Black      >60 mL/min/1.73:m2 86     RHEUM RESULTS Latest Ref Rng & Units 6/22/2018 12/18/2018 6/6/2019   SED RATE 0 - 30 mm/h - - -   CRP, INFLAMMATION 0.0 - 8.0 mg/L - - -   RHEUMATOID FACTOR <20 IU/mL - - -   AST 0 - 45 U/L 17 15 16   ALT 0 - 50 U/L 18 18 17   ALBUMIN 3.4 - 5.0 g/dL 3.5 3.5 -   WBC 4.0 - 11.0 10e9/L 6.6 6.9 7.9   RBC 3.8 - 5.2 10e12/L 3.84 3.82 3.90   HGB 11.7 - 15.7 g/dL 12.7 12.3 12.6   HCT 35.0 - 47.0 % 37.1 36.3 37.2   MCV 78 - 100 fl 97  95 95   MCHC 31.5 - 36.5 g/dL 34.2 33.9 33.9   RDW 10.0 - 15.0 % 12.8 13.0 12.9    - 450 10e9/L 269 257 274   CREATININE 0.52 - 1.04 mg/dL 0.81 0.72 0.87   GFR ESTIMATE, IF BLACK >60 mL/min/[1.73:m2] 89 >90 86   GFR ESTIMATE >60 mL/min/[1.73:m2] 74 84 74   IGA 70 - 380 mg/dL - - -   HEPATITIS C ANTIBODY NR - - -     Rheumatoid Factor   Date Value Ref Range Status   05/28/2015 49 (H) <20 IU/mL Final      Cyclic Cit Pept IgG/IgA   Date Value Ref Range Status   07/06/2015 205 (H) <20 UNITS Final     Comment:     Strongly Positive     IGA   Date Value Ref Range Status   05/28/2015 222 70 - 380 mg/dL Final     Scribe Disclosure:  IGabbi, am serving as a scribe to document services personally performed by Augustine Fields MD at this visit, based upon the provider's statements to me. All documentation has been reviewed by the aforementioned provider prior to being entered into the official medical record.      Augustine Fields MD

## 2019-06-14 NOTE — NURSING NOTE
"Chief Complaint   Patient presents with     RECHECK     RA     /83 (BP Location: Right arm, Patient Position: Sitting, Cuff Size: Adult Regular)   Pulse 59   Temp 98.2  F (36.8  C) (Oral)   Ht 1.607 m (5' 3.25\")   Wt 63.7 kg (140 lb 6.4 oz)   SpO2 99%   BMI 24.67 kg/m    Teresa Orourke CMA    6/14/2019 7:11 AM      "

## 2019-06-26 NOTE — MR AVS SNAPSHOT
After Visit Summary   7/10/2017    Carolina Mora    MRN: [id number]           Patient Information     Date Of Birth          1960        Visit Information        Provider Department      7/10/2017 8:30 AM Ann Marie Guajardo MD [facility] Rheumatology        Care Instructions    Joints look good today    Continue current dose methotrexate and Humira    Call for refills     Return in 6 months, sooner if needed          Follow-ups after your visit        Follow-up notes from your care team     Return in about 6 months (around 1/10/2018).      Your next 10 appointments already scheduled     Jan 08, 2018  8:30 AM CST   (Arrive by 8:15 AM)   Return Visit with Ann Marie Guajardo MD   [facility] Rheumatology ([facility])    [address]  [address] [facility] [address] [phone]              Who to contact     If you have questions or need follow up information about today's clinic visit or your schedule please contact [facility] RHEUMATOLOGY directly at [phone].  Normal or non-critical lab and imaging results will be communicated to you by [employer], letter or phone within 4 business days after the clinic has received the results. If you do not hear from us within 7 days, please contact the clinic through [employer] or phone. If you have a critical or abnormal lab result, we will notify you by phone as soon as possible.  Submit refill requests through [employer] or call your pharmacy and they will forward the refill request to us. Please allow 3 business days for your refill to be completed.          Additional Information About Your Visit        [employer]art Information     [employer] gives you secure access to your electronic health record. If you see a primary care provider, you can also send messages to your care team and make appointments. If you have questions, please call your primary care clinic.  If you do not have a primary care provider, please call  Tramadol 50 mg was prescribed by [facility] on 6/21/19    KB АНДРЕЙ until 7/8/19    Fwd to Dr. Chavez ([facility]): Please advise   "[phone] and they will assist you.        Care EveryWhere ID     This is your Care EveryWhere ID. This could be used by other organizations to access your [place] medical records  [id number]        Your Vitals Were     Pulse Temperature Respirations Height Pulse Oximetry BMI (Body Mass Index)    61 98.1  F (36.7  C) (Oral) 16 1.61 m (5' 3.39\") 98% 25.01 kg/m2       Blood Pressure from Last 3 Encounters:   07/10/17 125/83   06/30/17 124/71   04/03/17 127/80    Weight from Last 3 Encounters:   07/10/17 64.8 kg (142 lb 14.4 oz)   06/30/17 64.4 kg (142 lb)   04/03/17 64.4 kg (142 lb)              Today, you had the following     No orders found for display       Primary Care Provider Office Phone # Fax #    Michael Beltrán [phone]       [facility] [address] [facility] [address]        Equal Access to Services     : Hadii aad ku hadasho Soomaali, waaxda luqadaha, qaybta kaalmada adeegyada, gregorio mallory . So [facility] [phone].    ATENCIÓN: Si habla español, tiene a oneill disposición servicios gratuitos de asistencia lingüística. Llame al [phone].    We comply with applicable federal civil rights laws and [place] laws. We do not discriminate on the basis of race, color, national origin, age, disability sex, sexual orientation or gender identity.            Thank you!     Thank you for choosing [facility] RHEUMATOLOGY  for your care. Our goal is always to provide you with excellent care. Hearing back from our patients is one way we can continue to improve our services. Please take a few minutes to complete the written survey that you may receive in the mail after your visit with us. Thank you!             Your Updated Medication List - Protect others around you: Learn how to safely use, store and throw away your medicines at www.disposemymeds.org.          This list is accurate as of: 7/10/17  9:11 AM.  Always use your most " recent med list.                   Brand Name Dispense Instructions for use Diagnosis    adalimumab 40 MG/0.8ML pen kit    HUMIRA PEN    6 each    Inject 0.8 mLs (40 mg) Subcutaneous every 14 days    Encounter for long-term (current) use of high-risk medication, Elbow pain, left, Rheumatoid arthritis involving multiple sites with positive rheumatoid factor (H)       folic acid 1 MG tablet    FOLVITE    45 tablet    Take 1 tab daily. Day prior to, day of, and day after MTX take 2 tabs.    Encounter for long-term (current) use of high-risk medication, Rheumatoid arthritis involving multiple sites with positive rheumatoid factor (H), Elbow pain, left       ibuprofen 600 MG tablet    ADVIL/MOTRIN     Take 600 mg by mouth as needed Reported on 4/3/2017        levothyroxine 112 MCG tablet    SYNTHROID/LEVOTHROID    90 tablet    Take 1 tablet (112 mcg) by mouth daily    Hypothyroidism due to acquired atrophy of thyroid       methotrexate 2.5 MG tablet CHEMO     24 tablet    Take 6 tablets (15 mg) by mouth once a week Take 6 tablets, 15 mg  of methotrexate once a week    Rheumatoid arthritis involving multiple sites with positive rheumatoid factor (H), Encounter for long-term (current) use of high-risk medication, Elbow pain, left

## 2019-07-19 DIAGNOSIS — M05.79 RHEUMATOID ARTHRITIS INVOLVING MULTIPLE SITES WITH POSITIVE RHEUMATOID FACTOR (H): ICD-10-CM

## 2019-07-22 ENCOUNTER — TELEPHONE (OUTPATIENT)
Dept: RHEUMATOLOGY | Facility: CLINIC | Age: 59
End: 2019-07-22

## 2019-07-22 NOTE — TELEPHONE ENCOUNTER
Prior Authorization Not Needed per Insurance    Medication: HUMIRA - KAMINI   Insurance Company: [employer] - Phone [phone] Fax [phone]  Expected CoPay:      Pharmacy Filling the Rx: [facility]  Pharmacy Notified: Yes - rx released  Patient Notified: Yes - via pharmacy

## 2019-07-26 ENCOUNTER — ANCILLARY PROCEDURE (OUTPATIENT)
Dept: MAMMOGRAPHY | Facility: CLINIC | Age: 59
End: 2019-07-26
Payer: COMMERCIAL

## 2019-07-26 DIAGNOSIS — Z12.31 VISIT FOR SCREENING MAMMOGRAM: ICD-10-CM

## 2019-07-26 PROCEDURE — 77063 BREAST TOMOSYNTHESIS BI: CPT | Mod: TC

## 2019-07-26 PROCEDURE — 77067 SCR MAMMO BI INCL CAD: CPT | Mod: TC

## 2019-08-10 DIAGNOSIS — E03.4 HYPOTHYROIDISM DUE TO ACQUIRED ATROPHY OF THYROID: ICD-10-CM

## 2019-08-12 NOTE — TELEPHONE ENCOUNTER
"Requested Prescriptions   Pending Prescriptions Disp Refills     levothyroxine (SYNTHROID/LEVOTHROID) 112 MCG tablet [Pharmacy Med Name: LEVOTHYROXINE 112 MCG TABLET] 90 tablet 0     Sig: TAKE 1 TABLET BY MOUTH EVERY DAY   Last Written Prescription Date:  5/3/19  Last Fill Quantity: 90,  # refills: 0   Last office visit: 7/2/2018 with prescribing provider:     Future Office Visit:        Thyroid Protocol Failed - 8/10/2019  3:21 PM        Failed - Recent (12 mo) or future (30 days) visit within the authorizing provider's specialty     Patient had office visit in the last 12 months or has a visit in the next 30 days with authorizing provider or within the authorizing provider's specialty.  See \"Patient Info\" tab in inbasket, or \"Choose Columns\" in Meds & Orders section of the refill encounter.              Failed - Normal TSH on file in past 12 months     Recent Labs   Lab Test 07/02/18  1137   TSH 1.25              Passed - Patient is 12 years or older        Passed - Medication is active on med list        Passed - No active pregnancy on record     If patient is pregnant or has had a positive pregnancy test, please check TSH.          Passed - No positive pregnancy test in past 12 months     If patient is pregnant or has had a positive pregnancy test, please check TSH.            " Stroke (includes: TIA/SAH/ICH/Ischemic Stroke)

## 2019-08-13 RX ORDER — LEVOTHYROXINE SODIUM 112 UG/1
TABLET ORAL
Qty: 30 TABLET | Refills: 0 | Status: SHIPPED | OUTPATIENT
Start: 2019-08-13 | End: 2019-09-13

## 2019-08-13 NOTE — TELEPHONE ENCOUNTER
Patient due for fasting office visit- 30 days supply given.  Routing to team to schedule appointment     Abida Bah RN  [facility]  [phone]

## 2019-09-13 ENCOUNTER — OFFICE VISIT (OUTPATIENT)
Dept: FAMILY MEDICINE | Facility: CLINIC | Age: 59
End: 2019-09-13
Payer: COMMERCIAL

## 2019-09-13 VITALS
WEIGHT: 142 LBS | TEMPERATURE: 98.6 F | BODY MASS INDEX: 24.96 KG/M2 | DIASTOLIC BLOOD PRESSURE: 76 MMHG | HEART RATE: 60 BPM | SYSTOLIC BLOOD PRESSURE: 132 MMHG | OXYGEN SATURATION: 98 %

## 2019-09-13 DIAGNOSIS — M05.79 RHEUMATOID ARTHRITIS INVOLVING MULTIPLE SITES WITH POSITIVE RHEUMATOID FACTOR (H): ICD-10-CM

## 2019-09-13 DIAGNOSIS — Z00.00 ROUTINE GENERAL MEDICAL EXAMINATION AT A HEALTH CARE FACILITY: Primary | ICD-10-CM

## 2019-09-13 DIAGNOSIS — Z23 NEED FOR SHINGLES VACCINE: ICD-10-CM

## 2019-09-13 DIAGNOSIS — E78.5 HYPERLIPIDEMIA WITH TARGET LDL LESS THAN 130: ICD-10-CM

## 2019-09-13 DIAGNOSIS — E03.4 HYPOTHYROIDISM DUE TO ACQUIRED ATROPHY OF THYROID: ICD-10-CM

## 2019-09-13 DIAGNOSIS — K43.9 VENTRAL HERNIA WITHOUT OBSTRUCTION OR GANGRENE: ICD-10-CM

## 2019-09-13 DIAGNOSIS — R10.11 RUQ ABDOMINAL PAIN: ICD-10-CM

## 2019-09-13 DIAGNOSIS — K51.80 OTHER ULCERATIVE COLITIS WITHOUT COMPLICATION (H): ICD-10-CM

## 2019-09-13 LAB
ALBUMIN SERPL-MCNC: 3.7 G/DL (ref 3.4–5)
ALP SERPL-CCNC: 66 U/L (ref 40–150)
ALT SERPL W P-5'-P-CCNC: 18 U/L (ref 0–50)
ANION GAP SERPL CALCULATED.3IONS-SCNC: 6 MMOL/L (ref 3–14)
AST SERPL W P-5'-P-CCNC: 15 U/L (ref 0–45)
BILIRUB SERPL-MCNC: 0.7 MG/DL (ref 0.2–1.3)
BUN SERPL-MCNC: 16 MG/DL (ref 7–30)
CALCIUM SERPL-MCNC: 9.4 MG/DL (ref 8.5–10.1)
CHLORIDE SERPL-SCNC: 107 MMOL/L (ref 94–109)
CHOLEST SERPL-MCNC: 231 MG/DL
CO2 SERPL-SCNC: 27 MMOL/L (ref 20–32)
CREAT SERPL-MCNC: 0.7 MG/DL (ref 0.52–1.04)
ERYTHROCYTE [DISTWIDTH] IN BLOOD BY AUTOMATED COUNT: 12.8 % (ref 10–15)
GFR SERPL CREATININE-BSD FRML MDRD: >90 ML/MIN/{1.73_M2}
GLUCOSE SERPL-MCNC: 84 MG/DL (ref 70–99)
HCT VFR BLD AUTO: 38.4 % (ref 35–47)
HDLC SERPL-MCNC: 60 MG/DL
HGB BLD-MCNC: 13 G/DL (ref 11.7–15.7)
LDLC SERPL CALC-MCNC: 146 MG/DL
MCH RBC QN AUTO: 32 PG (ref 26.5–33)
MCHC RBC AUTO-ENTMCNC: 33.9 G/DL (ref 31.5–36.5)
MCV RBC AUTO: 95 FL (ref 78–100)
NONHDLC SERPL-MCNC: 171 MG/DL
PLATELET # BLD AUTO: 271 10E9/L (ref 150–450)
POTASSIUM SERPL-SCNC: 4 MMOL/L (ref 3.4–5.3)
PROT SERPL-MCNC: 7.2 G/DL (ref 6.8–8.8)
RBC # BLD AUTO: 4.06 10E12/L (ref 3.8–5.2)
SODIUM SERPL-SCNC: 140 MMOL/L (ref 133–144)
TRIGL SERPL-MCNC: 125 MG/DL
TSH SERPL DL<=0.005 MIU/L-ACNC: 0.57 MU/L (ref 0.4–4)
WBC # BLD AUTO: 8 10E9/L (ref 4–11)

## 2019-09-13 PROCEDURE — 90750 HZV VACC RECOMBINANT IM: CPT | Performed by: FAMILY MEDICINE

## 2019-09-13 PROCEDURE — 80053 COMPREHEN METABOLIC PANEL: CPT | Performed by: FAMILY MEDICINE

## 2019-09-13 PROCEDURE — 85027 COMPLETE CBC AUTOMATED: CPT | Performed by: FAMILY MEDICINE

## 2019-09-13 PROCEDURE — 84443 ASSAY THYROID STIM HORMONE: CPT | Performed by: FAMILY MEDICINE

## 2019-09-13 PROCEDURE — 99213 OFFICE O/P EST LOW 20 MIN: CPT | Mod: 25 | Performed by: FAMILY MEDICINE

## 2019-09-13 PROCEDURE — 80061 LIPID PANEL: CPT | Performed by: FAMILY MEDICINE

## 2019-09-13 PROCEDURE — 99396 PREV VISIT EST AGE 40-64: CPT | Mod: 25 | Performed by: FAMILY MEDICINE

## 2019-09-13 PROCEDURE — 36415 COLL VENOUS BLD VENIPUNCTURE: CPT | Performed by: FAMILY MEDICINE

## 2019-09-13 PROCEDURE — 90471 IMMUNIZATION ADMIN: CPT | Performed by: FAMILY MEDICINE

## 2019-09-13 RX ORDER — LEVOTHYROXINE SODIUM 112 UG/1
112 TABLET ORAL DAILY
Qty: 90 TABLET | Refills: 3 | Status: SHIPPED | OUTPATIENT
Start: 2019-09-13 | End: 2020-11-16

## 2019-09-13 ASSESSMENT — ENCOUNTER SYMPTOMS
HEMATURIA: 0
SHORTNESS OF BREATH: 0
HEARTBURN: 0
EYE PAIN: 0
ARTHRALGIAS: 0
PARESTHESIAS: 0
CONSTIPATION: 0
ABDOMINAL PAIN: 1
WEAKNESS: 0
JOINT SWELLING: 0
DIARRHEA: 0
PALPITATIONS: 0
CHILLS: 0
FEVER: 0
SORE THROAT: 0
COUGH: 0
HEADACHES: 0
BREAST MASS: 0
MYALGIAS: 0
NERVOUS/ANXIOUS: 0
NAUSEA: 0
DYSURIA: 0
HEMATOCHEZIA: 0
FREQUENCY: 0
DIZZINESS: 0

## 2019-09-13 NOTE — PROGRESS NOTES
SUBJECTIVE:   CC: Carolina Mora is an 59 year old woman who presents for a preventive health visit and follow-up on baseline health conditions.     Healthy Habits:     Getting at least 3 servings of Calcium per day:  Yes    Bi-annual eye exam:  Yes    Dental care twice a year:  Yes    Sleep apnea or symptoms of sleep apnea:  None    Diet:  Low salt    Frequency of exercise:  6-7 days/week    Duration of exercise:  30-45 minutes    Taking medications regularly:  Yes    Medication side effects:  None    PHQ-2 Total Score: 2    Additional concerns today:  No      Abdominal/Flank Pain  Duration of complaint: 6 months -- right side.    She had her gallbladder removed in 2001 along with a ventral hernia repair.  In the last 6 months or so she has felt very low-grade discomfort on the right side of her abdomen.  She has had a recurrent small ventral hernia in the upper abdomen in recent years.  That is generally asymptomatic.  Her right-sided abdominal discomfort may be slightly worse after big meal.  Also slightly worse with certain movements.    She does have underlying ulcerative colitis, but her colonoscopy this last December did not really show any active colitis.  She remains on Humira and methotrexate for rheumatoid arthritis and is doing well with that.  Her methotrexate was cut back from 8 pills/week to 3 pills/week and her Humira was cut back from every 2 weeks to every 3 weeks.  She remains fairly asymptomatic from an arthritis standpoint.    She remains on levothyroxine for hypothyroidism.      Today's PHQ-2 Score:   PHQ-2 ( 1999 [employer]) 9/13/2019   Q1: Little interest or pleasure in doing things 1   Q2: Feeling down, depressed or hopeless 1   PHQ-2 Score 2   Q1: Little interest or pleasure in doing things Several days   Q2: Feeling down, depressed or hopeless Several days   PHQ-2 Score 2       Abuse: Current or Past(Physical, Sexual or Emotional)- No  Do you feel safe in your environment? Yes    Social  History     Tobacco Use     Smoking status: Former Smoker     Types: Cigarettes     Last attempt to quit: 2015     Years since quittin.3     Smokeless tobacco: Never Used     Tobacco comment: 1 pack a week   Substance Use Topics     Alcohol use: Not Currently     Comment: occasional wine (twice per year)         Alcohol Use 2019   Prescreen: >3 drinks/day or >7 drinks/week? No   Prescreen: >3 drinks/day or >7 drinks/week? -       Reviewed orders with patient.  Reviewed health maintenance and updated orders accordingly - Yes  Patient Active Problem List   Diagnosis     Ulcerative colitis (H)     History of nephrolithiasis     Hyperlipidemia with target LDL less than 130     Ventral hernia without obstruction or gangrene     Rheumatoid arthritis involving multiple sites with positive rheumatoid factor (H)     Encounter for long-term (current) use of high-risk medication     Hypothyroidism due to acquired atrophy of thyroid     Past Surgical History:   Procedure Laterality Date     COLONOSCOPY  3-14-14     CRYOTHERAPY, CERVICAL  EARLY      HC REMOVAL GALLBLADDER      and repair ventral hernia       Social History     Tobacco Use     Smoking status: Former Smoker     Types: Cigarettes     Last attempt to quit: 2015     Years since quittin.3     Smokeless tobacco: Never Used     Tobacco comment: 1 pack a week   Substance Use Topics     Alcohol use: Not Currently     Comment: occasional wine (twice per year)     Family History   Problem Relation Age of Onset     Breast Cancer Mother      Thyroid Disease Mother      Diabetes Maternal Grandmother      Hypertension Maternal Grandmother      Cerebrovascular Disease Maternal Grandmother      Cancer Maternal Grandmother      Hypertension Paternal Grandmother      Breast Cancer Paternal Grandmother      Cancer Paternal Grandmother      Thyroid Disease Sister      Skin Cancer Sister      Asthma Son      Melanoma No family hx of          Current  Outpatient Medications   Medication Sig Dispense Refill     adalimumab (HUMIRA *CF* PEN) 40 MG/0.4ML pen kit Inject 0.4 mLs (40 mg) Subcutaneous every 14 days Hold for signs of infection, then seek medical attention. - Subcutaneous 2 each 4     folic acid (FOLVITE) 1 MG tablet Take 2 tablets (2 mg) by mouth daily 180 tablet 5     ibuprofen (ADVIL,MOTRIN) 600 MG tablet Take 600 mg by mouth as needed Reported on 4/3/2017       levothyroxine (SYNTHROID/LEVOTHROID) 112 MCG tablet Take 1 tablet (112 mcg) by mouth daily 90 tablet 3     methotrexate 2.5 MG tablet 3 tabs by mouth weekly 36 tablet 3     Allergies   Allergen Reactions     Codeine      Nausea and vomiting     Dye [Contrast Dye]        Mammogram Screening: Patient over age 50, mutual decision to screen reflected in health maintenance.    Pertinent mammograms are reviewed under the imaging tab.  History of abnormal Pap smear: YES - updated in Problem List and Health Maintenance accordingly  PAP / HPV Latest Ref Rng & Units 6/30/2017 7/26/2013 7/24/2012   PAP - NIL NIL NIL   HPV 16 DNA NEG Negative - -   HPV 18 DNA NEG Negative - -   OTHER HR HPV NEG Negative - -     Reviewed and updated as needed this visit by clinical staff  Tobacco  Allergies  Meds  Med Hx  Surg Hx  Fam Hx  Soc Hx        Reviewed and updated as needed this visit by Provider            Review of Systems   Constitutional: Negative for chills and fever.   HENT: Negative for congestion, ear pain, hearing loss and sore throat.    Eyes: Negative for pain and visual disturbance.   Respiratory: Negative for cough and shortness of breath.    Cardiovascular: Negative for chest pain, palpitations and peripheral edema.   Gastrointestinal: Positive for abdominal pain. Negative for constipation, diarrhea, heartburn, hematochezia and nausea.   Breasts:  Negative for tenderness, breast mass and discharge.   Genitourinary: Negative for dysuria, frequency, genital sores, hematuria, pelvic pain, urgency,  vaginal bleeding and vaginal discharge.   Musculoskeletal: Negative for arthralgias, joint swelling and myalgias.   Skin: Negative for rash.   Neurological: Negative for dizziness, weakness, headaches and paresthesias.   Psychiatric/Behavioral: Positive for mood changes. The patient is not nervous/anxious.           OBJECTIVE:   /76 (BP Location: Left arm, Patient Position: Sitting, Cuff Size: Adult Regular)   Pulse 60   Temp 98.6  F (37  C) (Oral)   Wt 64.4 kg (142 lb)   SpO2 98%   BMI 24.96 kg/m    Physical Exam  GENERAL: healthy, alert and no distress  EYES: Eyes grossly normal to inspection, PERRL and conjunctivae and sclerae normal  HENT: Grossly normal  NECK: no adenopathy, no asymmetry, masses, or scars and thyroid normal to palpation  RESP: lungs clear to auscultation - no rales, rhonchi or wheezes  CV: regular rate and rhythm, normal S1 S2, no S3 or S4, no murmur, click or rub, no peripheral edema and peripheral pulses strong  ABDOMEN: soft, very minimal discomfort to deep palpation a couple inches to the right of the umbilicus and slightly superior to that.  She does have a small midline ventral hernia superior to the umbilicus.  No masses are felt anywhere.  MS: no gross musculoskeletal defects noted, no edema  SKIN: no suspicious lesions or rashes  NEURO: Normal strength and tone, mentation intact and speech normal  PSYCH: mentation appears normal, affect normal/bright    Diagnostic Test Results:  Labs reviewed in Epic    ASSESSMENT/PLAN:       ICD-10-CM    1. Routine general medical examination at a health care facility Z00.00    2. Hypothyroidism due to acquired atrophy of thyroid E03.4 TSH WITH FREE T4 REFLEX     levothyroxine (SYNTHROID/LEVOTHROID) 112 MCG tablet   3. Rheumatoid arthritis involving multiple sites with positive rheumatoid factor (H) M05.79    4. Other ulcerative colitis without complication (H) K51.80    5. Hyperlipidemia with target LDL less than 130 E78.5 Lipid panel  "reflex to direct LDL Fasting   6. RUQ abdominal pain R10.11 Comprehensive metabolic panel     CBC with platelets   7. Ventral hernia without obstruction or gangrene K43.9    8. Need for shingles vaccine Z23 HC ZOSTER VACCINE RECOMBINANT ADJUVANTED IM NJX     VACCINE ADMINISTRATION, INITIAL     Blood pressure and other vital signs look acceptable  We discussed the above conditions  We will check fasting labs today as above  I suspect her low-grade abdominal discomfort is mechanical in nature from either her ventral hernia and/or scar tissue from her previous cholecystectomy, so we will follow that for now  We will check a CMP and CBC today to further evaluate that  We also discussed possibly doing a CT scan in the future if her symptoms were to progress and become more bothersome  She will continue her same medications and ongoing GI and rheumatology care as per their recommendations  We discussed the new shingles vaccine and we will give her that today  Return in 2 to 6 months for a booster dose  Plan another annual physical in 1 year    COUNSELING:  Reviewed preventive health counseling, as reflected in patient instructions       Regular exercise       Healthy diet/nutrition       Immunizations    Vaccinated for: Zoster          Estimated body mass index is 24.67 kg/m  as calculated from the following:    Height as of 6/14/19: 1.607 m (5' 3.25\").    Weight as of 6/14/19: 63.7 kg (140 lb 6.4 oz).         reports that she quit smoking about 4 years ago. Her smoking use included cigarettes. She has never used smokeless tobacco.  Tobacco Cessation Action Plan: patient is in the process of quitting smoking again    Counseling Resources:  ATP IV Guidelines  Pooled Cohorts Equation Calculator  Breast Cancer Risk Calculator  FRAX Risk Assessment  ICSI Preventive Guidelines  Dietary Guidelines for Americans, 2010  USDA's MyPlate  ASA Prophylaxis  Lung CA Screening    Michael Beltrán MD  [facility]  "

## 2019-09-13 NOTE — NURSING NOTE
Prior to immunization administration, verified patients identity using patient s name and date of birth. Please see Immunization Activity for additional information.     Screening Questionnaire for Adult Immunization    Are you sick today?   No   Do you have allergies to medications, food, a vaccine component or latex?   Yes   Have you ever had a serious reaction after receiving a vaccination?   No   Do you have a long-term health problem with heart disease, lung disease, asthma, kidney disease, metabolic disease (e.g. diabetes), anemia, or other blood disorder?   No   Do you have cancer, leukemia, HIV/AIDS, or any other immune system problem?   No   In the past 3 months, have you taken medications that affect  your immune system, such as prednisone, other steroids, or anticancer drugs; drugs for the treatment of rheumatoid arthritis, Crohn s disease, or psoriasis; or have you had radiation treatments?   No   Have you had a seizure, or a brain or other nervous system problem?   No   During the past year, have you received a transfusion of blood or blood     products, or been given immune (gamma) globulin or antiviral drug?   No   For women: Are you pregnant or is there a chance you could become        pregnant during the next month?   No   Have you received any vaccinations in the past 4 weeks?   Yes     Immunization questionnaire was positive for at least one answer.  Notified Dr. Beltrán.        Per orders of Dr. Beltrán, injection of Shingrix given by Diana Cobb CMA. Patient instructed to remain in clinic for 15 minutes afterwards, and to report any adverse reaction to me immediately.  Vaccine information supplied.       Screening performed by Diana Cobb CMA on 9/13/2019 at 8:54 AM.

## 2019-10-09 ENCOUNTER — TELEPHONE (OUTPATIENT)
Dept: RHEUMATOLOGY | Facility: CLINIC | Age: 59
End: 2019-10-09

## 2019-10-09 DIAGNOSIS — Z79.631 LONG TERM METHOTREXATE USER: ICD-10-CM

## 2019-10-09 LAB
ERYTHROCYTE [DISTWIDTH] IN BLOOD BY AUTOMATED COUNT: 13 % (ref 10–15)
HCT VFR BLD AUTO: 35.3 % (ref 35–47)
HGB BLD-MCNC: 11.9 G/DL (ref 11.7–15.7)
MCH RBC QN AUTO: 31.9 PG (ref 26.5–33)
MCHC RBC AUTO-ENTMCNC: 33.7 G/DL (ref 31.5–36.5)
MCV RBC AUTO: 95 FL (ref 78–100)
PLATELET # BLD AUTO: 265 10E9/L (ref 150–450)
RBC # BLD AUTO: 3.73 10E12/L (ref 3.8–5.2)
WBC # BLD AUTO: 7.6 10E9/L (ref 4–11)

## 2019-10-09 PROCEDURE — 84450 TRANSFERASE (AST) (SGOT): CPT | Performed by: INTERNAL MEDICINE

## 2019-10-09 PROCEDURE — 36415 COLL VENOUS BLD VENIPUNCTURE: CPT | Performed by: INTERNAL MEDICINE

## 2019-10-09 PROCEDURE — 82565 ASSAY OF CREATININE: CPT | Performed by: INTERNAL MEDICINE

## 2019-10-09 PROCEDURE — 84460 ALANINE AMINO (ALT) (SGPT): CPT | Performed by: INTERNAL MEDICINE

## 2019-10-09 PROCEDURE — 85027 COMPLETE CBC AUTOMATED: CPT | Performed by: INTERNAL MEDICINE

## 2019-10-10 LAB
ALT SERPL W P-5'-P-CCNC: 22 U/L (ref 0–50)
AST SERPL W P-5'-P-CCNC: 15 U/L (ref 0–45)
CREAT SERPL-MCNC: 0.79 MG/DL (ref 0.52–1.04)
GFR SERPL CREATININE-BSD FRML MDRD: 83 ML/MIN/{1.73_M2}

## 2019-10-14 ENCOUNTER — OFFICE VISIT (OUTPATIENT)
Dept: RHEUMATOLOGY | Facility: CLINIC | Age: 59
End: 2019-10-14
Attending: INTERNAL MEDICINE
Payer: COMMERCIAL

## 2019-10-14 VITALS
HEIGHT: 63 IN | WEIGHT: 140.9 LBS | SYSTOLIC BLOOD PRESSURE: 142 MMHG | BODY MASS INDEX: 24.96 KG/M2 | TEMPERATURE: 97.5 F | HEART RATE: 62 BPM | OXYGEN SATURATION: 98 % | DIASTOLIC BLOOD PRESSURE: 70 MMHG

## 2019-10-14 DIAGNOSIS — M05.79 RHEUMATOID ARTHRITIS INVOLVING MULTIPLE SITES WITH POSITIVE RHEUMATOID FACTOR (H): Primary | ICD-10-CM

## 2019-10-14 DIAGNOSIS — Z79.899 ENCOUNTER FOR LONG-TERM (CURRENT) USE OF HIGH-RISK MEDICATION: ICD-10-CM

## 2019-10-14 DIAGNOSIS — Z79.631 LONG TERM METHOTREXATE USER: ICD-10-CM

## 2019-10-14 PROCEDURE — G0463 HOSPITAL OUTPT CLINIC VISIT: HCPCS | Mod: ZF

## 2019-10-14 RX ORDER — FOLIC ACID 1 MG/1
1 TABLET ORAL DAILY
Qty: 90 TABLET | Refills: 5 | COMMUNITY
Start: 2019-10-14 | End: 2020-04-15

## 2019-10-14 ASSESSMENT — CLINICAL DISEASE ACTIVITY INDEX (CDAI): TOTAL_SCORE: 0

## 2019-10-14 ASSESSMENT — PAIN SCALES - GENERAL: PAINLEVEL: NO PAIN (0)

## 2019-10-14 ASSESSMENT — MIFFLIN-ST. JEOR: SCORE: 1197.5

## 2019-10-14 ASSESSMENT — JOINT PAIN: TOTAL NUMBER OF SWOLLEN JOINTS: 0

## 2019-10-14 NOTE — PROGRESS NOTES
[facility] - Rheumatology Clinic Visit    Carolina Mora  is a 59 year old here for follow-up rheumatoid arthritis (RA) +RF 49 CCP >205 to co-manage with Rheum MD Dr. Fields. Poor prognosis     Review- +RF 49 CCP >205 -hep b/c 2015 12/2015 -MTB QG; XR hand 8-2016   Past-Pt initially referred to rheumatology 7/2015 for 6m history of polyarticular inflammatory arthritis and hand paresthesias. Found to have +RF/CCP and started on MTX and prednisone with significant improvement. Started on Humira 1/2016 given persistent inflammatory arthritis. Started to titrate down on MTX from 8 to 6 tabs spring 2017 with no flare. Tapered to 7.5 mg/wk in mid-2018. Humira frequency reduced in 2019 every 21 day 6-2019.    Initial visit October 14, 2019  Joe Medina APRN:   Rheumatoid arthritis (RA) controlled, no pain or swelling and the right knee no longer swollen this resolved 2-2019. No EAS. Left elbow contracture the same. No flares. No neck pain. No longer has nodules. No sores in the mouth or nose,. GI side-effects or hairloss associated with methotrexate use. Active and walks 30 to 60 minutes daily. She denies alcohol use. No bowel issues now for a long time.     Continue subcutaneous adalimumab (humira) citrate free 40mg every 2 weeks and methotrexate 7.5 mg weekly SAT, folic acid 2 mg day (had GI side-effects at higher doses). No prednisone or NSAID use. Use to take ibuprofen 800 mg QID at dx     PMH:  Injury-left 4th finger fracture now contracture PIP   Past Medical History:  Dysplasia of cervix (had cryotx) 1990's  Hypothyroidism (07/31/2014) due to atrophy  Menopause  Nephrolithiasis (s/p ESWL) (01/11)  Rheumatoid arthritis   Ulcerative colitis originally dx after 3rd child 13 yr ago, treated follow-up  Colonoscopies not find now even had she had that not even seeing scar tissue, next check 5 yr 2023     High cholesterol -diet   Smoker    Ventral hernia   DEXA 2017 T-0.7 normal     Past Surgical  History:  Colonoscopy (03/14/14)  Cryothearpy, cervical (early 1990's)  HC removal Gallbladder (and repair ventral hernia)(2/01)    Family History:   No psoriasis, UC, crohn's, SLE, RA, PsA, gout, autoimmune thyroid.  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 Right handed.     [facility] personally reviewed and updated by me.    ROS:  Negative raynaud s phenomena, hairloss, sun sensitivities, keratoconjunctivitis sicca, pleurisy, inflammatory joint symptoms, significant rashes like malar, oral/nasal or ulcerations, inflammatory eye disease, inflammatory bowel disease, dactylitis, tenosynovitis, or enthespathy. Negative for miscarriages over 2 months into pregnancy, blood clots, gout, psoriasis, UC, crohn's. No temporal headache, no jaw claudication, no scalp tenderness, vision changes, carotidynia, cough. No STD or pregnancy symptoms. No Parotid swelling. Denies international travel. Denies history of pneumonia, hepatitis, tuberculosis, shingles, sepsis, tick bites or other infections.     +cold may notice hand gets red   CONSTITUTIONAL: No fevers, night sweats or unintentional weight change. No acute distress, swollen glands  EYES: No vision change, diplopia, pain in eyes or red eyes   EARS, NOSE, MOUTH, THROAT: No tinnitus or hearing change, no epistaxis or nasal discharge, no oral lesions, throat clear. Normal saliva pool.  No drymouth. No thyroid enlargement.   CARDIOVASCULAR: No chest pain, palpitations, or  "pain with walking, no orthopnea or PND   RESPIRATORY: No dyspnea, cough, shortness of breath or wheezing. No pleurisy.   GI: No nausea, vomiting, diarrhea or constipation, no abdominal pain, or blood in stools.   : No change in urine, no dysuria or hematuria   MUSCKL: No swollen, tender, red or painful joints. No nodules. No enthesitis, plantar fascitis or heel pain.   INTEGUMENTARY: No concerning lesions or moles   NEURO: No loss of strength or sensation, no numbness or tingling, no tremor, no dizziness, no headache. No falls   ENDO: No polyuria or polydipsia, no temperature intolerance   HEME/LYMPH:No concerning bumps, bleeding problems, or swollen lymph nodes. No recent infections, hospitalizations or new illnesses.   ALLERGY: No environmental allergies   PSYCH:No depression or anxiety, no sleep problems.  Otherwise 14 point ROS obtained, reviewed and found negative.     Physical exam:  Vitals: Blood pressure (!) 142/70, pulse 62, temperature 97.5  F (36.4  C), temperature source Oral, height 1.607 m (5' 3.27\"), weight 63.9 kg (140 lb 14.4 oz), SpO2 98 %, not currently breastfeeding.  Wt Readings from Last 4 Encounters:   10/14/19 63.9 kg (140 lb 14.4 oz)   09/13/19 64.4 kg (142 lb)   06/14/19 63.7 kg (140 lb 6.4 oz)   12/28/18 64.1 kg (141 lb 4.8 oz)     Constitutional: WD-WN-WG cooperative  Eyes: nl EOM, PERRLA, vision, conjunctiva, sclera, No Unilateral or bilateral external ear inflammation   ENT: nl external ears, nose, hearing, lips, teeth, gums, throat. No saddle nose   Neck: no mass or thyroid enlargement  Resp: lungs clear to auscultation, nl to palpation, nl effort  CV: RRR, no murmurs, rubs or gallops, no edema  GI: no ABD mass or tenderness, no HSM  : not tested  Lymph: no cervical or epitrochlear nodes  MS:  mild joint laxity of BL wrists   - L hand 4th PIP slight contraction flexure 2/2 prior fracture.   - Laxity of PIP joints without synovitis.   - Left elbow shows 10 degrees of extension but " full flexion, right elbow is normal.    - L elbow w/ 5 deg contraction flexure w/o active synovitis (unchanged)  - Bony enlargement at first MTP at both sides  All TMJ, neck, shoulder, elbow, wrist, hand, spine, hip, knee, ankle, and foot joints were examined and otherwise found normal. Normal  strength. No active synovitis. Negative Lhermitte's sign. No periuncle erythema  Skin: no nail pitting, alopecia, rash  Neuro: nl cranial nerves, strength, sensation, DTRs.   Psych: nl judgement, orientation, memory, affect.      Labs/Imaging:  Results for orders placed or performed in visit on 10/09/19   CBC with platelets   Result Value Ref Range    WBC 7.6 4.0 - 11.0 10e9/L    RBC Count 3.73 (L) 3.8 - 5.2 10e12/L    Hemoglobin 11.9 11.7 - 15.7 g/dL    Hematocrit 35.3 35.0 - 47.0 %    MCV 95 78 - 100 fl    MCH 31.9 26.5 - 33.0 pg    MCHC 33.7 31.5 - 36.5 g/dL    RDW 13.0 10.0 - 15.0 %    Platelet Count 265 150 - 450 10e9/L   AST   Result Value Ref Range    AST 15 0 - 45 U/L   ALT   Result Value Ref Range    ALT 22 0 - 50 U/L   Creatinine   Result Value Ref Range    Creatinine 0.79 0.52 - 1.04 mg/dL    GFR Estimate 83 >60 mL/min/[1.73_m2]    GFR Estimate If Black >90 >60 mL/min/[1.73_m2]       Impression/Plan:  1. Seropositive RA (RF 49/ACPA 205), dx 7/2015. Hx rheumatoid nodules):  Rheumatoid arthritis (RA) remission. No flare, no longer has nodules and left elbow stable contracture.   I recommend continuing methotrexate 7.5mg every 7 days, adalimumab (humira) every 21 days. I asked the patient to monitor for dosing cycle symptoms of stiffness and pain and to report any symptoms of stiffness to our office.    2. High risk medications. Labs every 12 weeks. Up-to-date with the pneumonia vaccines and flu vaccine. Labs are unremarkable in October 2019; CBC, LFT were all negative or normal. Informed her need labs every 12 week    Plan:  - Continue methotrexate 7.5 mg per week. While on methotrexate:  --q 3 mo AST/ALT,  Albumin, CBC with plts  --Limit EtOH intake to 2 drinks weekly; use folate 1 mg daily (reduce by 1 mg).  --Tylenol 500 mg tid prn nausea/HA associated with dosing.  - adalimumab (HUMIRA PEN) citrate free 40 MG/0.8ML pen kit every 21 days if wears down go to every other week.  - Continue laboratory monitoring every 3-4 months; Cr, ALT, AST, CBC  - If any neck pain or surgeries, would want to do cervical x-rays to look for AA changes or instability      Follow-up: Follow up with me 6 mth, Dr. Fields 1 year.      The patient understood the rationale for the diagnosis and treatment plan. Patient shared in the decision making. All questions were answered to best of my ability and the patient's satisfaction and agrees with the plan.     Joe ORTEGA, CNP, MSN  [facility]  Department of Rheumatology & Autoimmune Disorders    Education:   1. Immunization: Reviewed CDC guidelines with patient updated, information provided to patient based on CDC guidelines for vaccination recommendations, patient will discuss with primary provider  2. Bone Health:Educated on adequate calcium and vitamin D intake, Educated on exercise, Glucocorticoid education discussed risks, benefits & potential long term side effects from use  3. Discussed routine annual physicals, cancer screening, cardiac risk monitoring, bone health and primary care with primary care provider. Included is glucocorticosteroid increase change of infections.   4. Educated on diagnosis, prognosis, labs, imaging, treatment options (including risks, benefits, risk of no treatment), medications (use, dose, side-effects, risks of medications including infection/cancer/bone marrow suppression, lab monitoring), infections what to do, plan of cares, goals of treatment. Clinic cards given. Websites given for resources and education   5. Educated in Rheumatology we do not prescribe narcotics or manage chronic pain, the patient will need to discuss with  primary care or go to a pain clinic for management.

## 2019-10-14 NOTE — LETTER
10/14/2019      RE: Craolina Mora  [address]  [facility] [address]       [facility] - Rheumatology Clinic Visit    Carolina Mora  is a 59 year old here for follow-up rheumatoid arthritis (RA) +RF 49 CCP >205 to co-manage with Rheum MD Dr. Fields. Poor prognosis     Review- +RF 49 CCP >205 -hep b/c 2015 12/2015 -MTB QG; XR hand 8-2016   Past-Pt initially referred to rheumatology 7/2015 for 6m history of polyarticular inflammatory arthritis and hand paresthesias. Found to have +RF/CCP and started on MTX and prednisone with significant improvement. Started on Humira 1/2016 given persistent inflammatory arthritis. Started to titrate down on MTX from 8 to 6 tabs spring 2017 with no flare. Tapered to 7.5 mg/wk in mid-2018. Humira frequency reduced in 2019 every 21 day 6-2019.    Initial visit October 14, 2019  Joe Medina APRN:   Rheumatoid arthritis (RA) controlled, no pain or swelling and the right knee no longer swollen this resolved 2-2019. No EAS. Left elbow contracture the same. No flares. No neck pain. No longer has nodules. No sores in the mouth or nose,. GI side-effects or hairloss associated with methotrexate use. Active and walks 30 to 60 minutes daily. She denies alcohol use. No bowel issues now for a long time.     Continue subcutaneous adalimumab (humira) citrate free 40mg every 2 weeks and methotrexate 7.5 mg weekly SAT, folic acid 2 mg day (had GI side-effects at higher doses). No prednisone or NSAID use. Use to take ibuprofen 800 mg QID at dx     PMH:  Injury-left 4th finger fracture now contracture PIP   Past Medical History:  Dysplasia of cervix (had cryotx) 1990's  Hypothyroidism (07/31/2014) due to atrophy  Menopause  Nephrolithiasis (s/p ESWL) (01/11)  Rheumatoid arthritis   Ulcerative colitis originally dx after 3rd child 13 yr ago, treated follow-up  Colonoscopies not find now even had she had that not even seeing scar tissue, next check 5 yr 2023     High  cholesterol -diet   Smoker    Ventral hernia   DEXA 2017 T-0.7 normal     Past Surgical History:  Colonoscopy (03/14/14)  Cryothearpy, cervical (early 1990's)  HC removal Gallbladder (and repair ventral hernia)(2/01)    Family History:   No psoriasis, UC, crohn's, SLE, RA, PsA, gout, autoimmune thyroid.  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 Right handed.     PMSH personally reviewed and updated by me.    ROS:  Negative raynaud s phenomena, hairloss, sun sensitivities, keratoconjunctivitis sicca, pleurisy, inflammatory joint symptoms, significant rashes like malar, oral/nasal or ulcerations, inflammatory eye disease, inflammatory bowel disease, dactylitis, tenosynovitis, or enthespathy. Negative for miscarriages over 2 months into pregnancy, blood clots, gout, psoriasis, UC, crohn's. No temporal headache, no jaw claudication, no scalp tenderness, vision changes, carotidynia, cough. No STD or pregnancy symptoms. No Parotid swelling. Denies international travel. Denies history of pneumonia, hepatitis, tuberculosis, shingles, sepsis, tick bites or other infections.     +cold may notice hand gets red   CONSTITUTIONAL: No fevers, night sweats or unintentional weight change. No acute distress, swollen glands  EYES: No vision change, diplopia, pain in eyes or red eyes   EARS, NOSE, MOUTH, THROAT: No tinnitus or hearing change, no epistaxis or nasal discharge, no oral lesions, throat clear. Normal saliva pool.  No  "drymouth. No thyroid enlargement.   CARDIOVASCULAR: No chest pain, palpitations, or pain with walking, no orthopnea or PND   RESPIRATORY: No dyspnea, cough, shortness of breath or wheezing. No pleurisy.   GI: No nausea, vomiting, diarrhea or constipation, no abdominal pain, or blood in stools.   : No change in urine, no dysuria or hematuria   MUSCKL: No swollen, tender, red or painful joints. No nodules. No enthesitis, plantar fascitis or heel pain.   INTEGUMENTARY: No concerning lesions or moles   NEURO: No loss of strength or sensation, no numbness or tingling, no tremor, no dizziness, no headache. No falls   ENDO: No polyuria or polydipsia, no temperature intolerance   HEME/LYMPH:No concerning bumps, bleeding problems, or swollen lymph nodes. No recent infections, hospitalizations or new illnesses.   ALLERGY: No environmental allergies   PSYCH:No depression or anxiety, no sleep problems.  Otherwise 14 point ROS obtained, reviewed and found negative.     Physical exam:  Vitals: Blood pressure (!) 142/70, pulse 62, temperature 97.5  F (36.4  C), temperature source Oral, height 1.607 m (5' 3.27\"), weight 63.9 kg (140 lb 14.4 oz), SpO2 98 %, not currently breastfeeding.  Wt Readings from Last 4 Encounters:   10/14/19 63.9 kg (140 lb 14.4 oz)   09/13/19 64.4 kg (142 lb)   06/14/19 63.7 kg (140 lb 6.4 oz)   12/28/18 64.1 kg (141 lb 4.8 oz)     Constitutional: WD-WN-WG cooperative  Eyes: nl EOM, PERRLA, vision, conjunctiva, sclera, No Unilateral or bilateral external ear inflammation   ENT: nl external ears, nose, hearing, lips, teeth, gums, throat. No saddle nose   Neck: no mass or thyroid enlargement  Resp: lungs clear to auscultation, nl to palpation, nl effort  CV: RRR, no murmurs, rubs or gallops, no edema  GI: no ABD mass or tenderness, no HSM  : not tested  Lymph: no cervical or epitrochlear nodes  MS:  mild joint laxity of BL wrists   - L hand 4th PIP slight contraction flexure 2/2 prior fracture.   - Laxity " of PIP joints without synovitis.   - Left elbow shows 10 degrees of extension but full flexion, right elbow is normal.    - L elbow w/ 5 deg contraction flexure w/o active synovitis (unchanged)  - Bony enlargement at first MTP at both sides  All TMJ, neck, shoulder, elbow, wrist, hand, spine, hip, knee, ankle, and foot joints were examined and otherwise found normal. Normal  strength. No active synovitis. Negative Lhermitte's sign. No periuncle erythema  Skin: no nail pitting, alopecia, rash  Neuro: nl cranial nerves, strength, sensation, DTRs.   Psych: nl judgement, orientation, memory, affect.      Labs/Imaging:  Results for orders placed or performed in visit on 10/09/19   CBC with platelets   Result Value Ref Range    WBC 7.6 4.0 - 11.0 10e9/L    RBC Count 3.73 (L) 3.8 - 5.2 10e12/L    Hemoglobin 11.9 11.7 - 15.7 g/dL    Hematocrit 35.3 35.0 - 47.0 %    MCV 95 78 - 100 fl    MCH 31.9 26.5 - 33.0 pg    MCHC 33.7 31.5 - 36.5 g/dL    RDW 13.0 10.0 - 15.0 %    Platelet Count 265 150 - 450 10e9/L   AST   Result Value Ref Range    AST 15 0 - 45 U/L   ALT   Result Value Ref Range    ALT 22 0 - 50 U/L   Creatinine   Result Value Ref Range    Creatinine 0.79 0.52 - 1.04 mg/dL    GFR Estimate 83 >60 mL/min/[1.73_m2]    GFR Estimate If Black >90 >60 mL/min/[1.73_m2]       Impression/Plan:  1. Seropositive RA (RF 49/ACPA 205), dx 7/2015. Hx rheumatoid nodules):  Rheumatoid arthritis (RA) remission. No flare, no longer has nodules and left elbow stable contracture.   I recommend continuing methotrexate 7.5mg every 7 days, adalimumab (humira) every 21 days. I asked the patient to monitor for dosing cycle symptoms of stiffness and pain and to report any symptoms of stiffness to our office.    2. High risk medications. Labs every 12 weeks. Up-to-date with the pneumonia vaccines and flu vaccine. Labs are unremarkable in October 2019; CBC, LFT were all negative or normal. Informed her need labs every 12 week    Plan:  -  Continue methotrexate 7.5 mg per week. While on methotrexate:  --q 3 mo AST/ALT, Albumin, CBC with plts  --Limit EtOH intake to 2 drinks weekly; use folate 1 mg daily (reduce by 1 mg).  --Tylenol 500 mg tid prn nausea/HA associated with dosing.  - adalimumab (HUMIRA PEN) citrate free 40 MG/0.8ML pen kit every 21 days if wears down go to every other week.  - Continue laboratory monitoring every 3-4 months; Cr, ALT, AST, CBC  - If any neck pain or surgeries, would want to do cervical x-rays to look for AA changes or instability      Follow-up: Follow up with me 6 mth, Dr. Fields 1 year.      The patient understood the rationale for the diagnosis and treatment plan. Patient shared in the decision making. All questions were answered to best of my ability and the patient's satisfaction and agrees with the plan.     Joe ORTEGA, CNP, MSN  [facility]  Department of Rheumatology & Autoimmune Disorders    Education:   1. Immunization: Reviewed CDC guidelines with patient updated, information provided to patient based on CDC guidelines for vaccination recommendations, patient will discuss with primary provider  2. Bone Health:Educated on adequate calcium and vitamin D intake, Educated on exercise, Glucocorticoid education discussed risks, benefits & potential long term side effects from use  3. Discussed routine annual physicals, cancer screening, cardiac risk monitoring, bone health and primary care with primary care provider. Included is glucocorticosteroid increase change of infections.   4. Educated on diagnosis, prognosis, labs, imaging, treatment options (including risks, benefits, risk of no treatment), medications (use, dose, side-effects, risks of medications including infection/cancer/bone marrow suppression, lab monitoring), infections what to do, plan of cares, goals of treatment. Clinic cards given. Websites given for resources and education   5. Educated in Rheumatology we do not  prescribe narcotics or manage chronic pain, the patient will need to discuss with primary care or go to a pain clinic for management.             JORDAN Edwards CNP

## 2019-10-14 NOTE — NURSING NOTE
"Chief Complaint   Patient presents with     RECHECK     4 month follow up     Blood pressure (!) 142/70, pulse 62, temperature 97.5  F (36.4  C), temperature source Oral, height 1.607 m (5' 3.27\"), weight 63.9 kg (140 lb 14.4 oz), SpO2 98 %, not currently breastfeeding.    Patient declined flu vaccine. She stated she has already had one for this year.   Christal Walters on 10/14/2019 at 7:13 AM    "

## 2019-11-24 ENCOUNTER — OFFICE VISIT (OUTPATIENT)
Dept: URGENT CARE | Facility: URGENT CARE | Age: 59
End: 2019-11-24
Payer: COMMERCIAL

## 2019-11-24 ENCOUNTER — ANCILLARY PROCEDURE (OUTPATIENT)
Dept: GENERAL RADIOLOGY | Facility: CLINIC | Age: 59
End: 2019-11-24
Attending: INTERNAL MEDICINE
Payer: COMMERCIAL

## 2019-11-24 VITALS
RESPIRATION RATE: 18 BRPM | WEIGHT: 140 LBS | TEMPERATURE: 98.2 F | BODY MASS INDEX: 24.59 KG/M2 | OXYGEN SATURATION: 96 % | SYSTOLIC BLOOD PRESSURE: 149 MMHG | HEART RATE: 76 BPM | DIASTOLIC BLOOD PRESSURE: 76 MMHG

## 2019-11-24 DIAGNOSIS — M05.9 RHEUMATOID ARTHRITIS WITH POSITIVE RHEUMATOID FACTOR, INVOLVING UNSPECIFIED SITE (H): ICD-10-CM

## 2019-11-24 DIAGNOSIS — M23.91 INTERNAL DERANGEMENT OF KNEE, RIGHT: Primary | ICD-10-CM

## 2019-11-24 DIAGNOSIS — D84.9 IMMUNOSUPPRESSION (H): ICD-10-CM

## 2019-11-24 DIAGNOSIS — S89.91XA KNEE INJURY, RIGHT, INITIAL ENCOUNTER: ICD-10-CM

## 2019-11-24 DIAGNOSIS — R03.0 ELEVATED BP WITHOUT DIAGNOSIS OF HYPERTENSION: ICD-10-CM

## 2019-11-24 PROCEDURE — 99214 OFFICE O/P EST MOD 30 MIN: CPT | Performed by: INTERNAL MEDICINE

## 2019-11-24 PROCEDURE — 73562 X-RAY EXAM OF KNEE 3: CPT | Mod: RT

## 2019-11-24 ASSESSMENT — ENCOUNTER SYMPTOMS: FEVER: 0

## 2019-11-24 NOTE — PATIENT INSTRUCTIONS
Xray - no fracture     Concern with medial mensicus tear    Continue current cares:  RICE  Ortho referral    Carolina to follow up with Primary Care provider regarding elevated blood pressure.       Patient Education     Knee Pain with Possible Torn Meniscus    The meniscus is a tough cartilage pad that cushions the inside of the knee joint. It helps absorb the shock from movement. It also spreads the weight of your body evenly across the knee joint. This prevents excess wear and tear to the bones of that joint.  The most common causes of meniscal tears are injuries, especially related to sports and degenerative disease that happens with aging.  A meniscus tear commonly happens during a twisting injury when the knee is bent. This causes pain, swelling, reduced movement of the knee, and trouble walking. There may be popping, clicking, joint locking or inability to completely straighten the knee. Ligaments of the knee may also be injured.  A torn meniscus is diagnosed by physical exam and X-rays. In the case of a severe injury, the knee may be too painful to examine fully. A more accurate exam can be done after the initial swelling goes down. An MRI may be done to make a final diagnosis.  If your healthcare provider suspects a meniscal injury, you will treat your knee with ice and rest and preventing movement of the knee. A splint or knee brace that keeps your leg straight may be put on to protect the joint. Depending on the severity of the injury, surgery may be needed. A cartilage injury may take 4 to 12 weeks to heal depending on how bad it is.  Home care    Stay off the injured leg as much as possible until you can walk on it without pain. If you have a lot of pain when walking, crutches or a walker may be prescribed. (These can be rented or bought at many pharmacies and surgical or orthopedic supply stores). Follow your healthcare provider's advice about when to begin putting weight on that leg.    Keep your leg  elevated to reduce pain and swelling. When sleeping, place a pillow under the injured leg. When sitting, support the injured leg so it is above heart level. This is very important during the first 48 hours.    Apply an ice pack over the injured area for 15 to 20 minutes every 3 to 6 hours. You should do this for the first 24 to 48 hours. You can make an ice pack by filling a plastic bag that seals at the top with ice cubes and then wrapping it with a thin towel. Continue to use ice packs for relief of pain and swelling as needed. As the ice melts, be careful not to get your wrap, splint, or cast wet. After 48 hours, apply heat (warm shower or warm bath) for 15 to 20 minutes several times a day, or alternate ice and heat. You can place the ice pack directly over the splint. If you have to wear a hook-and-loop knee brace, you can open it to apply the ice pack, or heat, directly to the knee. Never put ice directly on the skin. Always wrap the ice in a towel or other type of cloth.    You may use over-the-counter pain medicine to control pain, unless another pain medicine was prescribed. If you have chronic liver or kidney disease or ever had a stomach ulcer or gastrointestinal bleeding, talk with your healthcare provider before using these medicines.    If you were given a splint, keep it dry at all times. Bathe with your splint out of the water. Protect it with a large plastic bag that is rubber-banded or taped at the top end. If a fiberglass splint gets wet, you can dry it with a hair dryer set on cool. If you have a hook-and-loop knee brace, you can remove this to bathe, unless told otherwise.    Check with your healthcare provider before returning to sports or full work duties.  Follow-up care  Follow up with your healthcare provider, or as advised. This is usually within 1 to 2 weeks. Further testing may be required to check the extent of your injury.  If X-rays were taken, you will be told of any new findings  that may affect your care.  Call 911  Call 911 if you have:     Shortness of breath    Chest pain  When to seek medical advice  Call your healthcare provider right away if any of these occur:    Toes or foot gets swollen, cold, blue, numb, or tingly    Pain or swelling spreads over the knee or calf    Warmth or redness appears over the knee or calf    Fever of 100.4 F (38 C) or higher, or as directed by your healthcare provider    Chills  Date Last Reviewed: 5/1/2018 2000-2018 [employer]. [address] [place] [id number]. All rights reserved. This information is not intended as a substitute for professional medical care. Always follow your healthcare professional's instructions.

## 2019-11-24 NOTE — PROGRESS NOTES
SUBJECTIVE:   Carolina Mora is a 59 year old female presenting with a chief complaint of   Chief Complaint   Patient presents with     Urgent Care     Musculoskeletal Problem     Right knee pain. Patient over used leg on thrusday. Friday she turned and knee gave out. Pain is mostly on left side of right knee.        She is an established patient of [facility].    MS Injury/Pain    Onset of symptoms was 4 day(s) ago.  Location: right knee  Context:       The injury happened while at home      Mechanism: stressed knee while trying to miss water puddle, then fell next day after turning awkard      Patient experienced immediate pain, delayed swelling, was able to bear weight directly after injury  Course of symptoms is same.    Current and Associated symptoms: Pain, Swelling, Warmth, Decreased range of motion and Stiffness    Aggravating Factors: weight-bearing and movement  Therapies to improve symptoms include: ice, ibuprofen, rest and compression, crutches      Review of Systems   Constitutional: Negative for fever.   Musculoskeletal:        Other joints feel fine.  RA seems baseline       Past Medical History:   Diagnosis Date     Dysplasia of cervix 1990's    had cryotx     Hyperlipidemia LDL goal < 130 7/31/2014     Hypothyroidism      Menopause age 48     Nephrolithiasis 1/11    s/p ESWL     Rheumatoid arthritis (H) 6/15     Smoker      Ulcerative colitis (H) 1-06     Family History   Problem Relation Age of Onset     Breast Cancer Mother      Thyroid Disease Mother      Diabetes Maternal Grandmother      Hypertension Maternal Grandmother      Cerebrovascular Disease Maternal Grandmother      Cancer Maternal Grandmother      Hypertension Paternal Grandmother      Breast Cancer Paternal Grandmother      Cancer Paternal Grandmother      Thyroid Disease Sister      Skin Cancer Sister      Asthma Son      Melanoma No family hx of      Current Outpatient Medications   Medication Sig Dispense Refill     adalimumab  (HUMIRA *CF* PEN) 40 MG/0.4ML pen kit Inject 0.4 mLs (40 mg) Subcutaneous every 14 days Hold for signs of infection, then seek medical attention. - Subcutaneous 2 each 4     folic acid (FOLVITE) 1 MG tablet Take 1 tablet (1 mg) by mouth daily 90 tablet 5     ibuprofen (ADVIL,MOTRIN) 600 MG tablet Take 600 mg by mouth as needed Reported on 4/3/2017       levothyroxine (SYNTHROID/LEVOTHROID) 112 MCG tablet Take 1 tablet (112 mcg) by mouth daily 90 tablet 3     methotrexate 2.5 MG tablet 3 tabs by mouth weekly 36 tablet 3     Social History     Tobacco Use     Smoking status: Former Smoker     Types: Cigarettes     Last attempt to quit: 2015     Years since quittin.5     Smokeless tobacco: Never Used     Tobacco comment: 1 pack a week   Substance Use Topics     Alcohol use: Not Currently     Comment: occasional wine (twice per year)       OBJECTIVE  BP (!) 149/76   Pulse 76   Temp 98.2  F (36.8  C) (Oral)   Resp 18   Wt 63.5 kg (140 lb)   SpO2 96%   BMI 24.59 kg/m      Physical Exam  Vitals signs reviewed.   Constitutional:       Appearance: Normal appearance.   Musculoskeletal:      Comments: lower extremities   right knee  KNEE: The injured knee reveals slight medial joint swelling, signif medial joint line  Tenderness,  no instability; ligaments intact,   Pain with varus pressure  , reduced range of motion.   No pain along lat collateral ligaments  Patella nontender        Neurological:      Mental Status: She is alert.         Labs:  No results found for this or any previous visit (from the past 24 hour(s)).    X-Ray was done, my findings are: no fracture     ASSESSMENT:      ICD-10-CM    1. Internal derangement of knee, right M23.91 ORTHO  REFERRAL   2. Immunosuppression (H) D89.9    3. Rheumatoid arthritis with positive rheumatoid factor, involving unspecified site (H) M05.9    4. Elevated BP without diagnosis of hypertension R03.0    5. Knee injury, right, initial encounter S89.91XA XR  Knee Right 3 Views     ORTHO  REFERRAL        Medical Decision Making:    Differential Diagnosis:  MS Injury Pain: muscle strain, contusion and   Concern with medial mensicus tear    Currently no concern for involvement of RA in knee pain    Serious Comorbid Conditions:  Adult:  .Rheumatoid arthritis, ulcerative colitis on immunosuppressive therapy    PLAN:    MS Injury/Pain  ice, elevate, rest, splint: knee sleeve,  and Ibuprofen  Crutches.    Ortho referral    Followup:    If not improving or if condition worsens, follow up with your Primary Care Provider    Patient Instructions     Xray - no fracture     Concern with medial mensicus tear    Continue current cares:  RICE  Ortho referral    Carolina to follow up with Primary Care provider regarding elevated blood pressure.       Patient Education     Knee Pain with Possible Torn Meniscus    The meniscus is a tough cartilage pad that cushions the inside of the knee joint. It helps absorb the shock from movement. It also spreads the weight of your body evenly across the knee joint. This prevents excess wear and tear to the bones of that joint.  The most common causes of meniscal tears are injuries, especially related to sports and degenerative disease that happens with aging.  A meniscus tear commonly happens during a twisting injury when the knee is bent. This causes pain, swelling, reduced movement of the knee, and trouble walking. There may be popping, clicking, joint locking or inability to completely straighten the knee. Ligaments of the knee may also be injured.  A torn meniscus is diagnosed by physical exam and X-rays. In the case of a severe injury, the knee may be too painful to examine fully. A more accurate exam can be done after the initial swelling goes down. An MRI may be done to make a final diagnosis.  If your healthcare provider suspects a meniscal injury, you will treat your knee with ice and rest and preventing movement of the knee. A splint  or knee brace that keeps your leg straight may be put on to protect the joint. Depending on the severity of the injury, surgery may be needed. A cartilage injury may take 4 to 12 weeks to heal depending on how bad it is.  Home care    Stay off the injured leg as much as possible until you can walk on it without pain. If you have a lot of pain when walking, crutches or a walker may be prescribed. (These can be rented or bought at many pharmacies and surgical or orthopedic supply stores). Follow your healthcare provider's advice about when to begin putting weight on that leg.    Keep your leg elevated to reduce pain and swelling. When sleeping, place a pillow under the injured leg. When sitting, support the injured leg so it is above heart level. This is very important during the first 48 hours.    Apply an ice pack over the injured area for 15 to 20 minutes every 3 to 6 hours. You should do this for the first 24 to 48 hours. You can make an ice pack by filling a plastic bag that seals at the top with ice cubes and then wrapping it with a thin towel. Continue to use ice packs for relief of pain and swelling as needed. As the ice melts, be careful not to get your wrap, splint, or cast wet. After 48 hours, apply heat (warm shower or warm bath) for 15 to 20 minutes several times a day, or alternate ice and heat. You can place the ice pack directly over the splint. If you have to wear a hook-and-loop knee brace, you can open it to apply the ice pack, or heat, directly to the knee. Never put ice directly on the skin. Always wrap the ice in a towel or other type of cloth.    You may use over-the-counter pain medicine to control pain, unless another pain medicine was prescribed. If you have chronic liver or kidney disease or ever had a stomach ulcer or gastrointestinal bleeding, talk with your healthcare provider before using these medicines.    If you were given a splint, keep it dry at all times. Bathe with your splint out  of the water. Protect it with a large plastic bag that is rubber-banded or taped at the top end. If a fiberglass splint gets wet, you can dry it with a hair dryer set on cool. If you have a hook-and-loop knee brace, you can remove this to bathe, unless told otherwise.    Check with your healthcare provider before returning to sports or full work duties.  Follow-up care  Follow up with your healthcare provider, or as advised. This is usually within 1 to 2 weeks. Further testing may be required to check the extent of your injury.  If X-rays were taken, you will be told of any new findings that may affect your care.  Call 911  Call 911 if you have:     Shortness of breath    Chest pain  When to seek medical advice  Call your healthcare provider right away if any of these occur:    Toes or foot gets swollen, cold, blue, numb, or tingly    Pain or swelling spreads over the knee or calf    Warmth or redness appears over the knee or calf    Fever of 100.4 F (38 C) or higher, or as directed by your healthcare provider    [employer]  Date Last Reviewed: 5/1/2018 2000-2018 [employer]. [address] [place] [address], [place] [address]. All rights reserved. This information is not intended as a substitute for professional medical care. Always follow your healthcare professional's instructions.

## 2019-11-30 ENCOUNTER — OFFICE VISIT (OUTPATIENT)
Dept: ORTHOPEDICS | Facility: CLINIC | Age: 59
End: 2019-11-30
Payer: COMMERCIAL

## 2019-11-30 ENCOUNTER — ANCILLARY PROCEDURE (OUTPATIENT)
Dept: GENERAL RADIOLOGY | Facility: CLINIC | Age: 59
End: 2019-11-30
Attending: PEDIATRICS
Payer: COMMERCIAL

## 2019-11-30 VITALS
BODY MASS INDEX: 24.8 KG/M2 | DIASTOLIC BLOOD PRESSURE: 82 MMHG | WEIGHT: 140 LBS | HEIGHT: 63 IN | SYSTOLIC BLOOD PRESSURE: 161 MMHG

## 2019-11-30 DIAGNOSIS — M25.561 ACUTE PAIN OF RIGHT KNEE: ICD-10-CM

## 2019-11-30 DIAGNOSIS — M25.561 ACUTE PAIN OF RIGHT KNEE: Primary | ICD-10-CM

## 2019-11-30 PROCEDURE — 99204 OFFICE O/P NEW MOD 45 MIN: CPT | Performed by: PEDIATRICS

## 2019-11-30 PROCEDURE — 73560 X-RAY EXAM OF KNEE 1 OR 2: CPT | Mod: RT

## 2019-11-30 ASSESSMENT — MIFFLIN-ST. JEOR: SCORE: 1193.13

## 2019-11-30 NOTE — LETTER
11/30/2019         RE: Carolina Mora  [address]  [facility] [address]        Dear Colleague,    Thank you for referring your patient, Carolina Mora, to the [facility]. Please see a copy of my visit note below.    Sports Medicine Clinic Visit    PCP: Michael Beltrán    Carolina Mora is a 59 year old female who is seen as a self referral presenting with right knee pain    Injury: She reports on 11/23/19 she states she was stepping over a large puddle out  her house. She states when she pushed off her right leg she felt a sharp pain in her medial knee. She reports he knee malena with pivoting and twisting her knee. She has been doing ice, compression and elevation. She was seen in the  on 11/24/19 and given crutches.    Location of Pain: right medial knee  Duration of Pain: 1 week(s)  Rating of Pain at worst: 8/10  Rating of Pain Currently: 0/10  Symptoms are better with: Ice, Tylenol, Ibuprofen, Rest and Elevation  Symptoms are worse with: weight bearing  Additional Features:   Positive: swelling, popping, instability and weakness   Negative: bruising, grinding, catching, locking, paresthesias and numbness  Other evaluation and/or treatments so far consists of: Nothing  Prior History of related problems: Rheumatoid arthritis    Social History: desk work    Review of Systems  Skin: no bruising, yes swelling  Musculoskeletal: as above  Neurologic: no numbness, paresthesias  Remainder of review of systems is negative including constitutional, CV, pulmonary, GI, except as noted in HPI or medical history.    Patient's current problem list, past medical and surgical history, and family history were reviewed.    Patient Active Problem List   Diagnosis     Ulcerative colitis (H)     History of nephrolithiasis     Hyperlipidemia with target LDL less than 130     Ventral hernia without obstruction or gangrene     Rheumatoid arthritis involving multiple sites with  "positive rheumatoid factor (H)     Encounter for long-term (current) use of high-risk medication     Hypothyroidism due to acquired atrophy of thyroid     Past Medical History:   Diagnosis Date     Dysplasia of cervix 1990's    had cryotx     Hyperlipidemia LDL goal < 130 7/31/2014     Hypothyroidism      Menopause age 48     Nephrolithiasis 1/11    s/p ESWL     Rheumatoid arthritis (H) 6/15     Smoker      Ulcerative colitis (H) 1-06     Past Surgical History:   Procedure Laterality Date     COLONOSCOPY  3-14-14     CRYOTHERAPY, CERVICAL  EARLY 1990's     HC REMOVAL GALLBLADDER  2-01    and repair ventral hernia     Family History   Problem Relation Age of Onset     Breast Cancer Mother      Thyroid Disease Mother      Diabetes Maternal Grandmother      Hypertension Maternal Grandmother      Cerebrovascular Disease Maternal Grandmother      Cancer Maternal Grandmother      Hypertension Paternal Grandmother      Breast Cancer Paternal Grandmother      Cancer Paternal Grandmother      Thyroid Disease Sister      Skin Cancer Sister      Asthma Son      Melanoma No family hx of          Objective  BP (!) 161/82   Ht 1.607 m (5' 3.25\")   Wt 63.5 kg (140 lb)   BMI 24.60 kg/m       GENERAL APPEARANCE: healthy, alert and no distress   GAIT: antalgic  SKIN: no suspicious lesions or rashes  HEENT: Sclera clear, anicteric  CV: good peripheral pulses  RESP: Breathing not labored  NEURO: Normal strength and tone, mentation intact and speech normal  PSYCH:  mentation appears normal and affect normal/bright    Bilateral Knee exam    Inspection:      moderate effusion right    Patella:      Mobility -       hypomobile right    Tender:      medial joint line right    Non Tender:      remainder of knee area bilateral    Knee ROM:      Range of motion limited by pain and swelling right (10 - 110)    Strength:      5-/5 with knee extension right    Special Tests:     positive (+) Kvng right       neg (-) Lachmans right       " neg (-) anterior drawer right       neg (-) posterior drawer right       neg (-) varus at 0 deg and 30 deg right       neg (-) valgus at 0 deg and 30 deg right    Gait:      normal    Neurovascular:      2+ peripheral pulses bilaterally and brisk capillary refill       sensation grossly intact    Radiology  I ordered, visualized and reviewed these images with the patient  Xr Knee Right 3 Views  Result Date: 11/24/2019  RIGHT KNEE THREE VIEWS  11/24/2019 9:53 AM HISTORY: Knee injury, right, initial encounter.   IMPRESSION: Small to moderate effusion. There is no evidence of fracture or calcified intra-articular body. Otherwise negative. JESENIA MAYER MD    Assessment:  1. Acute pain of right knee      Given concern for meniscal tear, will obtain MRI.  Discussed supportive care in the interim.    Plan:  - Today's Plan of Care:  MRI of the right knee - Call [phone] to schedule MRI  Rehab: Home Exercise Program - motion and strength  Continue with relative rest and activity modification, Ice, Compression, and Elevation.  Can apply ice 10-15 minutes 3-4 times per day as needed.  Crutches as needed    -We also discussed other future treatment options:  Referral to Physical Therapy or Orthopedic Surgery    Follow Up: In clinic with Dr. Lyman after MRI (wait at least 1-2 days)  Carolina to follow up with Primary Care provider regarding elevated blood pressure.    Concerning signs and symptoms were reviewed.  The patient expressed understanding of this management plan and all questions were answered at this time.    Jessi Lyman MD CAQ  Primary Care Sports Medicine  [facility]      Again, thank you for allowing me to participate in the care of your patient.        Sincerely,        Jessi Lyman MD

## 2019-11-30 NOTE — RESULT ENCOUNTER NOTE
These results were discussed during office visit.    Jessi Lyman MD, CAQ  Primary Care Sports Medicine  [facility]

## 2019-11-30 NOTE — PROGRESS NOTES
Sports Medicine Clinic Visit    PCP: Michael Beltrán    Carolina Mora is a 59 year old female who is seen as a self referral presenting with right knee pain    Injury: She reports on 11/23/19 she states she was stepping over a large puddle out  her house. She states when she pushed off her right leg she felt a sharp pain in her medial knee. She reports he knee malena with pivoting and twisting her knee. She has been doing ice, compression and elevation. She was seen in the UC on 11/24/19 and given crutches.    Location of Pain: right medial knee  Duration of Pain: 1 week(s)  Rating of Pain at worst: 8/10  Rating of Pain Currently: 0/10  Symptoms are better with: Ice, Tylenol, Ibuprofen, Rest and Elevation  Symptoms are worse with: weight bearing  Additional Features:   Positive: swelling, popping, instability and weakness   Negative: bruising, grinding, catching, locking, paresthesias and numbness  Other evaluation and/or treatments so far consists of: Nothing  Prior History of related problems: Rheumatoid arthritis    Social History: desk work    Review of Systems  Skin: no bruising, yes swelling  Musculoskeletal: as above  Neurologic: no numbness, paresthesias  Remainder of review of systems is negative including constitutional, CV, pulmonary, GI, except as noted in HPI or medical history.    Patient's current problem list, past medical and surgical history, and family history were reviewed.    Patient Active Problem List   Diagnosis     Ulcerative colitis (H)     History of nephrolithiasis     Hyperlipidemia with target LDL less than 130     Ventral hernia without obstruction or gangrene     Rheumatoid arthritis involving multiple sites with positive rheumatoid factor (H)     Encounter for long-term (current) use of high-risk medication     Hypothyroidism due to acquired atrophy of thyroid     Past Medical History:   Diagnosis Date     Dysplasia of cervix 1990's    had cryotx     Hyperlipidemia LDL goal <  "130 7/31/2014     Hypothyroidism      Menopause age 48     Nephrolithiasis 1/11    s/p ESWL     Rheumatoid arthritis (H) 6/15     Smoker      Ulcerative colitis (H) 1-06     Past Surgical History:   Procedure Laterality Date     COLONOSCOPY  3-14-14     CRYOTHERAPY, CERVICAL  EARLY 1990's     HC REMOVAL GALLBLADDER  2-01    and repair ventral hernia     Family History   Problem Relation Age of Onset     Breast Cancer Mother      Thyroid Disease Mother      Diabetes Maternal Grandmother      Hypertension Maternal Grandmother      Cerebrovascular Disease Maternal Grandmother      Cancer Maternal Grandmother      Hypertension Paternal Grandmother      Breast Cancer Paternal Grandmother      Cancer Paternal Grandmother      Thyroid Disease Sister      Skin Cancer Sister      Asthma Son      Melanoma No family hx of          Objective  BP (!) 161/82   Ht 1.607 m (5' 3.25\")   Wt 63.5 kg (140 lb)   BMI 24.60 kg/m      GENERAL APPEARANCE: healthy, alert and no distress   GAIT: antalgic  SKIN: no suspicious lesions or rashes  HEENT: Sclera clear, anicteric  CV: good peripheral pulses  RESP: Breathing not labored  NEURO: Normal strength and tone, mentation intact and speech normal  PSYCH:  mentation appears normal and affect normal/bright    Bilateral Knee exam    Inspection:      moderate effusion right    Patella:      Mobility -       hypomobile right    Tender:      medial joint line right    Non Tender:      remainder of knee area bilateral    Knee ROM:      Range of motion limited by pain and swelling right (10 - 110)    Strength:      5-/5 with knee extension right    Special Tests:     positive (+) Kvng right       neg (-) Lachmans right       neg (-) anterior drawer right       neg (-) posterior drawer right       neg (-) varus at 0 deg and 30 deg right       neg (-) valgus at 0 deg and 30 deg right    Gait:      normal    Neurovascular:      2+ peripheral pulses bilaterally and brisk capillary refill       " sensation grossly intact    Radiology  I ordered, visualized and reviewed these images with the patient  Xr Knee Right 3 Views  Result Date: 11/24/2019  RIGHT KNEE THREE VIEWS  11/24/2019 9:53 AM HISTORY: Knee injury, right, initial encounter.   IMPRESSION: Small to moderate effusion. There is no evidence of fracture or calcified intra-articular body. Otherwise negative. JESENIA MAYER MD    Assessment:  1. Acute pain of right knee      Given concern for meniscal tear, will obtain MRI.  Discussed supportive care in the interim.    Plan:  - Today's Plan of Care:  MRI of the right knee - Call [phone] to schedule MRI  Rehab: Home Exercise Program - motion and strength  Continue with relative rest and activity modification, Ice, Compression, and Elevation.  Can apply ice 10-15 minutes 3-4 times per day as needed.  Crutches as needed    -We also discussed other future treatment options:  Referral to Physical Therapy or Orthopedic Surgery    Follow Up: In clinic with Dr. Lyman after MRI (wait at least 1-2 days)  [place] to follow up with Primary Care provider regarding elevated blood pressure.    Concerning signs and symptoms were reviewed.  The patient expressed understanding of this management plan and all questions were answered at this time.    Jessi Lyman MD CAQ  Primary Care Sports Medicine  [facility]

## 2019-11-30 NOTE — PATIENT INSTRUCTIONS
Plan:  - Today's Plan of Care:  MRI of the right knee - Call [phone] to schedule MRI  Rehab: Home Exercise Program - motion and strength  Continue with relative rest and activity modification, Ice, Compression, and Elevation.  Can apply ice 10-15 minutes 3-4 times per day as needed.  Crutches as needed    -We also discussed other future treatment options:  Referral to Physical Therapy or Orthopedic Surgery    Follow Up: In clinic with Dr. Lyman after MRI (wait at least 1-2 days)    If you have any further questions for your physician or physician s care team you can call [phone] and use option 3 to leave a voice message. Calls received during business hours will be returned same day.      Carolina to follow up with Primary Care provider regarding elevated blood pressure.

## 2019-12-06 ENCOUNTER — ANCILLARY PROCEDURE (OUTPATIENT)
Dept: GENERAL RADIOLOGY | Facility: CLINIC | Age: 59
End: 2019-12-06
Attending: PEDIATRICS
Payer: COMMERCIAL

## 2019-12-06 ENCOUNTER — ANCILLARY PROCEDURE (OUTPATIENT)
Dept: MRI IMAGING | Facility: CLINIC | Age: 59
End: 2019-12-06
Attending: PEDIATRICS
Payer: COMMERCIAL

## 2019-12-06 DIAGNOSIS — M25.561 ACUTE PAIN OF RIGHT KNEE: ICD-10-CM

## 2019-12-06 PROCEDURE — 70030 X-RAY EYE FOR FOREIGN BODY: CPT

## 2019-12-06 PROCEDURE — 73721 MRI JNT OF LWR EXTRE W/O DYE: CPT | Mod: TC

## 2019-12-12 ENCOUNTER — OFFICE VISIT (OUTPATIENT)
Dept: ORTHOPEDICS | Facility: CLINIC | Age: 59
End: 2019-12-12
Payer: COMMERCIAL

## 2019-12-12 VITALS
DIASTOLIC BLOOD PRESSURE: 85 MMHG | BODY MASS INDEX: 24.8 KG/M2 | HEIGHT: 63 IN | WEIGHT: 140 LBS | SYSTOLIC BLOOD PRESSURE: 138 MMHG

## 2019-12-12 DIAGNOSIS — S83.511A RUPTURE OF ANTERIOR CRUCIATE LIGAMENT OF RIGHT KNEE, INITIAL ENCOUNTER: ICD-10-CM

## 2019-12-12 DIAGNOSIS — M25.561 ACUTE PAIN OF RIGHT KNEE: Primary | ICD-10-CM

## 2019-12-12 PROCEDURE — 99214 OFFICE O/P EST MOD 30 MIN: CPT | Performed by: PEDIATRICS

## 2019-12-12 ASSESSMENT — MIFFLIN-ST. JEOR: SCORE: 1193.13

## 2019-12-12 NOTE — PATIENT INSTRUCTIONS
Plan:  - Today's Plan of Care:  Referral to an Orthopedic Surgeon  Rehab: Physical Therapy: [facility] - [phone]  Continue with relative rest and activity modification, Ice, Compression, and Elevation.  Can apply ice 10-15 minutes 3-4 times per day as needed.    Follow Up: as needed    If you have any further questions for your physician or physician s care team you can call [phone] and use option 3 to leave a voice message. Calls received during business hours will be returned same day.

## 2019-12-12 NOTE — PROGRESS NOTES
Sports Medicine Clinic Visit - Interim History December 12, 2019    PCP: Michael Beltrán    Carolina Mora is a 59 year old female who is seen in f/u up for Acute pain of right knee. Since last visit on 11/30/19 patient has completed an MRI of her right knee. She has been doing her HEP. She reports an improvement in her knee pain pain. She continues to have pain with walking in the medial knee. She reports difficulty fully extending her knee.  Here to review MRI results.    Symptoms are better with: Ice, Tylenol, Ibuprofen, Rest and Elevation  Symptoms are worse with: walking  Additional Features:   Positive: swelling, popping, instability and weakness   Negative: bruising, grinding, catching, locking, paresthesias and numbness    Social History: desk work    Review of Systems  Skin: no bruising, yes swelling  Musculoskeletal: as above  Neurologic: no numbness, paresthesias  Remainder of review of systems is negative including constitutional, CV, pulmonary, GI, except as noted in HPI or medical history.    Patient's current problem list, past medical and surgical history, and family history were reviewed.    Patient Active Problem List   Diagnosis     Ulcerative colitis (H)     History of nephrolithiasis     Hyperlipidemia with target LDL less than 130     Ventral hernia without obstruction or gangrene     Rheumatoid arthritis involving multiple sites with positive rheumatoid factor (H)     Encounter for long-term (current) use of high-risk medication     Hypothyroidism due to acquired atrophy of thyroid     Past Medical History:   Diagnosis Date     Dysplasia of cervix 1990's    had cryotx     Hyperlipidemia LDL goal < 130 7/31/2014     Hypothyroidism      Menopause age 48     Nephrolithiasis 1/11    s/p ESWL     Rheumatoid arthritis (H) 6/15     Smoker      Ulcerative colitis (H) 1-06     Past Surgical History:   Procedure Laterality Date     COLONOSCOPY  3-14-14     CRYOTHERAPY, CERVICAL  EARLY 1990's     HC  "REMOVAL GALLBLADDER  2-01    and repair ventral hernia     Family History   Problem Relation Age of Onset     Breast Cancer Mother      Thyroid Disease Mother      Diabetes Maternal Grandmother      Hypertension Maternal Grandmother      Cerebrovascular Disease Maternal Grandmother      Cancer Maternal Grandmother      Hypertension Paternal Grandmother      Breast Cancer Paternal Grandmother      Cancer Paternal Grandmother      Thyroid Disease Sister      Skin Cancer Sister      Asthma Son      Melanoma No family hx of        Objective  /85   Ht 1.607 m (5' 3.25\")   Wt 63.5 kg (140 lb)   BMI 24.60 kg/m      GENERAL APPEARANCE: healthy, alert and no distress   GAIT: normal  SKIN: no suspicious lesions or rashes  HEENT: Sclera clear, anicteric  CV: good peripheral pulses  RESP: Breathing not labored  NEURO: Normal strength and tone, mentation intact and speech normal  PSYCH:  mentation appears normal and affect normal/bright     Bilateral Knee exam  Inspection:      mild effusion right     Patella:      Mobility -       hypomobile right     Tender:      medial joint line right     Non Tender:      remainder of knee area bilateral     Knee ROM:      Range of motion limited by pain and swelling right (10 - 110)     Strength:      5-/5 with knee extension right     Special Tests:     positive (+) Kvng right       positive (+) Lachmans right       positive (+) anterior drawer right       neg (-) posterior drawer right       neg (-) varus at 0 deg and 30 deg right       neg (-) valgus at 0 deg and 30 deg right     Gait:      normal     Neurovascular:      2+ peripheral pulses bilaterally and brisk capillary refill       sensation grossly intact    Radiology  I ordered, visualized and reviewed these images with the patient  MR RIGHT KNEE WITHOUT CONTRAST 12/6/2019 8:45 AM     HISTORY:  Medial right knee pain after an injury 11/21/2019.      TECHNIQUE:  Axial and coronal T2 with fat suppression. Coronal " T1.  Sagittal dual echo T2.     COMPARISON: None.     FINDINGS:   Medial Meniscus: Intrasubstance myxoid degeneration in the posterior  horn and mid body. No definite tear is seen, but careful probing of  this region is recommended, especially the inferior surface. The  anterior horn is unremarkable. No displaced meniscal fragments or  flaps.        Lateral Meniscus: Mild intrasubstance myxoid degenerative change in  the mid body. No evidence for tear.        Anterior Cruciate Ligament: Proximal anterior cruciate ligament  fascicles are difficult to define, and the mid and distal fascicles  lie in an abnormal horizontal position. No acute edema in the  intercondylar notch. The findings are most consistent with a chronic  ACL tear at the femoral attachment.      Posterior Cruciate Ligament: Unremarkable.      Medial Collateral Ligament: Unremarkable.     Lateral Collateral Ligament Complex, Popliteus Tendon: The iliotibial  band, fibular collateral ligament, biceps femoris tendon, and  popliteus tendon are unremarkable.     Osseous and Cartilaginous Structures: Unremarkable. No bone contusion,  marrow edema, or chondromalacia identified.     Extensor Mechanism: Mild edema surrounding the medial patellar  retinaculum may be related to an acute sprain. No evidence for  full-thickness tear. The lateral patellar retinaculum, patellar tendon  and visualized quadriceps tendon appear normal.     Joint Space: Small joint effusion. No definite loose bodies  appreciated.     Additional Findings: No significant popliteal cyst. No  semimembranosus-tibial collateral ligament or pes anserine bursitis.                                                                      IMPRESSION:   1. Myxoid degenerative change in the posterior horn and mid body of  the medial meniscus. No definite tear, but careful probing of this  region is recommended if arthroscopy is performed.  2. Chronic-appearing complete tear of the anterior cruciate  ligament  at the femoral attachment.  3. Mild sprain medial patellar retinaculum.  4. Small joint space effusion.     Assessment:  1. Acute pain of right knee    2. Rupture of anterior cruciate ligament of right knee, initial encounter      ACl tear appears chronic on MRI, however, no prior injury.  Otherwise cartilage preserved.  We discussed the following treatment options: symptom treatment, activity modification/rest, injection, rehab and referral. Following discussion, plan: will refer to physical therapy, patient would also like to discuss options with orthopedic surgery.      Plan:  - Today's Plan of Care:  Referral to an Orthopedic Surgeon  Rehab: Physical Therapy: [facility] - [phone]  Continue with relative rest and activity modification, Ice, Compression, and Elevation.  Can apply ice 10-15 minutes 3-4 times per day as needed.    Follow Up: as needed    Concerning signs and symptoms were reviewed.  The patient expressed understanding of this management plan and all questions were answered at this time.    Jessi Lyman MD CA  Primary Care Sports Medicine  [facility]

## 2019-12-12 NOTE — LETTER
12/12/2019         RE: Carolina Mora  [address]  [facility] [address]        Dear Colleague,    Thank you for referring your patient, Carolina Mora, to the [facility] [place]. Please see a copy of my visit note below.    Sports Medicine Clinic Visit - Interim History December 12, 2019    PCP: Michael Beltrán    Carolina Mora is a 59 year old female who is seen in f/u up for Acute pain of right knee. Since last visit on 11/30/19 patient has completed an MRI of her right knee. She has been doing her HEP. She reports an improvement in her knee pain pain. She continues to have pain with walking in the medial knee. She reports difficulty fully extending her knee.  Here to review MRI results.    Symptoms are better with: Ice, Tylenol, Ibuprofen, Rest and Elevation  Symptoms are worse with: walking  Additional Features:   Positive: swelling, popping, instability and weakness   Negative: bruising, grinding, catching, locking, paresthesias and numbness    Social History: desk work    Review of Systems  Skin: no bruising, yes swelling  Musculoskeletal: as above  Neurologic: no numbness, paresthesias  Remainder of review of systems is negative including constitutional, CV, pulmonary, GI, except as noted in HPI or medical history.    Patient's current problem list, past medical and surgical history, and family history were reviewed.    Patient Active Problem List   Diagnosis     Ulcerative colitis (H)     History of nephrolithiasis     Hyperlipidemia with target LDL less than 130     Ventral hernia without obstruction or gangrene     Rheumatoid arthritis involving multiple sites with positive rheumatoid factor (H)     Encounter for long-term (current) use of high-risk medication     Hypothyroidism due to acquired atrophy of thyroid     Past Medical History:   Diagnosis Date     Dysplasia of cervix 1990's    had cryotx     Hyperlipidemia LDL goal < 130 7/31/2014     Hypothyroidism   "    Menopause age 48     Nephrolithiasis 1/11    s/p ESWL     Rheumatoid arthritis (H) 6/15     Smoker      Ulcerative colitis (H) 1-06     Past Surgical History:   Procedure Laterality Date     COLONOSCOPY  3-14-14     CRYOTHERAPY, CERVICAL  EARLY 1990's     HC REMOVAL GALLBLADDER  2-01    and repair ventral hernia     Family History   Problem Relation Age of Onset     Breast Cancer Mother      Thyroid Disease Mother      Diabetes Maternal Grandmother      Hypertension Maternal Grandmother      Cerebrovascular Disease Maternal Grandmother      Cancer Maternal Grandmother      Hypertension Paternal Grandmother      Breast Cancer Paternal Grandmother      Cancer Paternal Grandmother      Thyroid Disease Sister      Skin Cancer Sister      Asthma Son      Melanoma No family hx of        Objective  /85   Ht 1.607 m (5' 3.25\")   Wt 63.5 kg (140 lb)   BMI 24.60 kg/m       GENERAL APPEARANCE: healthy, alert and no distress   GAIT:  normal  SKIN: no suspicious lesions or rashes  HEENT: Sclera clear, anicteric  CV: good peripheral pulses  RESP: Breathing not labored  NEURO: Normal strength and tone, mentation intact and speech normal  PSYCH:  mentation appears normal and affect normal/bright     Bilateral Knee exam  Inspection:      m ild effusion right     Patella:      Mobility -       hypomobile right     Tender:      medial joint line right     Non Tender:      remainder of knee area bilateral     Knee ROM:      Range of motion limited by pain and swelling right (10 - 110)     Strength:      5-/5 with knee extension right     Special Tests:     positive (+) Kvng right        positive (+) Lachmans right        positive (+) anterior drawer right       neg (-) posterior drawer right       neg (-) varus at 0 deg and 30 deg right       neg (-) valgus at 0 deg and 30 deg right     Gait:      normal     Neurovascular:      2+ peripheral pulses bilaterally and brisk capillary refill       sensation grossly " intact    Radiology  I ordered, visualized and reviewed these images with the patient  MR RIGHT KNEE WITHOUT CONTRAST 12/6/2019 8:45 AM     HISTORY:  Medial right knee pain after an injury 11/21/2019.      TECHNIQUE:  Axial and coronal T2 with fat suppression. Coronal T1.  Sagittal dual echo T2.     COMPARISON: None.     FINDINGS:   Medial Meniscus: Intrasubstance myxoid degeneration in the posterior  horn and mid body. No definite tear is seen, but careful probing of  this region is recommended, especially the inferior surface. The  anterior horn is unremarkable. No displaced meniscal fragments or  flaps.        Lateral Meniscus: Mild intrasubstance myxoid degenerative change in  the mid body. No evidence for tear.        Anterior Cruciate Ligament: Proximal anterior cruciate ligament  fascicles are difficult to define, and the mid and distal fascicles  lie in an abnormal horizontal position. No acute edema in the  intercondylar notch. The findings are most consistent with a chronic  ACL tear at the femoral attachment.      Posterior Cruciate Ligament: Unremarkable.      Medial Collateral Ligament: Unremarkable.     Lateral Collateral Ligament Complex, Popliteus Tendon: The iliotibial  band, fibular collateral ligament, biceps femoris tendon, and  popliteus tendon are unremarkable.     Osseous and Cartilaginous Structures: Unremarkable. No bone contusion,  marrow edema, or chondromalacia identified.     Extensor Mechanism: Mild edema surrounding the medial patellar  retinaculum may be related to an acute sprain. No evidence for  full-thickness tear. The lateral patellar retinaculum, patellar tendon  and visualized quadriceps tendon appear normal.     Joint Space: Small joint effusion. No definite loose bodies  appreciated.     Additional Findings: No significant popliteal cyst. No  semimembranosus-tibial collateral ligament or pes anserine bursitis.                                                                       IMPRESSION:   1. Myxoid degenerative change in the posterior horn and mid body of  the medial meniscus. No definite tear, but careful probing of this  region is recommended if arthroscopy is performed.  2. Chronic-appearing complete tear of the anterior cruciate ligament  at the femoral attachment.  3. Mild sprain medial patellar retinaculum.  4. Small joint space effusion.     Assessment:  1. Acute pain of right knee    2. Rupture of anterior cruciate ligament of right knee, initial encounter      ACl tear appears chronic on MRI, however, no prior injury.  Otherwise cartilage preserved.  We discussed the following treatment options: symptom treatment, activity modification/rest, injection, rehab and referral. Following discussion, plan: will refer to physical therapy, patient would also like to discuss options with orthopedic surgery.      Plan:  - Today's Plan of Care:  Referral to an Orthopedic Surgeon  Rehab: Physical Therapy: [facility] - [phone]  Continue with relative rest and activity modification, Ice, Compression, and Elevation.  Can apply ice 10-15 minutes 3-4 times per day as needed.    Follow Up: as needed    Concerning signs and symptoms were reviewed.  The patient expressed understanding of this management plan and all questions were answered at this time.    eJssi Lyman MD CAQ  Primary Care Sports Medicine  [facility]    Again, thank you for allowing me to participate in the care of your patient.        Sincerely,        Jessi Lyman MD

## 2019-12-17 ENCOUNTER — OFFICE VISIT (OUTPATIENT)
Dept: ORTHOPEDICS | Facility: CLINIC | Age: 59
End: 2019-12-17
Payer: COMMERCIAL

## 2019-12-17 VITALS
SYSTOLIC BLOOD PRESSURE: 137 MMHG | OXYGEN SATURATION: 100 % | DIASTOLIC BLOOD PRESSURE: 72 MMHG | HEART RATE: 89 BPM | WEIGHT: 140 LBS | BODY MASS INDEX: 24.8 KG/M2 | HEIGHT: 63 IN

## 2019-12-17 DIAGNOSIS — S83.511A RUPTURE OF ANTERIOR CRUCIATE LIGAMENT OF RIGHT KNEE, INITIAL ENCOUNTER: Primary | ICD-10-CM

## 2019-12-17 DIAGNOSIS — M05.79 RHEUMATOID ARTHRITIS INVOLVING MULTIPLE SITES WITH POSITIVE RHEUMATOID FACTOR (H): ICD-10-CM

## 2019-12-17 DIAGNOSIS — S89.91XA INJURY OF RIGHT KNEE, INITIAL ENCOUNTER: ICD-10-CM

## 2019-12-17 PROCEDURE — 99243 OFF/OP CNSLTJ NEW/EST LOW 30: CPT | Performed by: ORTHOPAEDIC SURGERY

## 2019-12-17 ASSESSMENT — MIFFLIN-ST. JEOR: SCORE: 1193.13

## 2019-12-17 NOTE — PROGRESS NOTES
SUBJECTIVE:   Carolina Mora is a 59 year old female who is seen in consultation at the request of Jessi Sweeney MD for evaluation of right knee pain. She had an injury on 11/21/2019 and went to urgent care on 11/24/2019 where xrays were taken. The injury occurred while she was trying to avoid a puddle and found herself with her right leg abducted away from her body. She had a valgus force to the knee. She felt sudden pain but she was able to walk with no issues. The next day she was in the yard, a simple pivot caused her knee to give out on her. She reports most of her knee pain today is anteromedially.     Over the last 3 weeks, the right knee has gradually gotten better. After the episode of giving way, it was too painful to put weight on it. However, the past 3 weeks, she adds there might be a feeling of occasional instability when the knee fully extends.     Treatments tried to this point: Knee sleeve, home exercise program and she has 2 physical therapy appointments coming up.     Orthopedic PMH: Rheumatoid Arthritis    Review of Systems:  Constitutional:  NEGATIVE for fever, chills, change in weight  Integumentary/Skin:  NEGATIVE for worrisome rashes, moles or lesions  Eyes:  NEGATIVE for vision changes or irritation  ENT/Mouth:  NEGATIVE for ear, mouth and throat problems  Resp:  NEGATIVE for significant cough or SOB  Breast:  NEGATIVE for masses, tenderness or discharge  CV:  NEGATIVE for chest pain, palpitations or peripheral edema  GI:  NEGATIVE for nausea, abdominal pain, heartburn, or change in bowel habits  :  Negative   Musculoskeletal:  See HPI above  Neuro:  NEGATIVE for weakness, dizziness or paresthesias  Endocrine:  NEGATIVE for temperature intolerance, skin/hair changes  Heme/allergy/immune:  NEGATIVE for bleeding problems  Psychiatric:  NEGATIVE for changes in mood or affect    Past Medical History:   Past Medical History:   Diagnosis Date     Dysplasia of cervix 1990's    had cryotx  "    Hyperlipidemia LDL goal < 130 2014     Hypothyroidism      Menopause age 48     Nephrolithiasis     s/p ESWL     Rheumatoid arthritis (H) 6/15     Smoker      Ulcerative colitis (H) 1-06     Past Surgical History:   Past Surgical History:   Procedure Laterality Date     COLONOSCOPY  3-14-14     CRYOTHERAPY, CERVICAL  EARLY      HC REMOVAL GALLBLADDER      and repair ventral hernia     Family History:   Family History   Problem Relation Age of Onset     Breast Cancer Mother      Thyroid Disease Mother      Diabetes Maternal Grandmother      Hypertension Maternal Grandmother      Cerebrovascular Disease Maternal Grandmother      Cancer Maternal Grandmother      Hypertension Paternal Grandmother      Breast Cancer Paternal Grandmother      Cancer Paternal Grandmother      Thyroid Disease Sister      Skin Cancer Sister      Asthma Son      Melanoma No family hx of      Social History:   Social History     Tobacco Use     Smoking status: Former Smoker     Types: Cigarettes     Last attempt to quit: 2015     Years since quittin.5     Smokeless tobacco: Never Used     Tobacco comment: 1 pack a week   Substance Use Topics     Alcohol use: Not Currently     Comment: occasional wine (twice per year)     This document serves as a record of the services and decisions personally performed and made by GLORIA Canela MD. It was created on his behalf by Bharath Hutchinson, a trained medical scribe. The creation of this document is based the provider's statements to the medical scribe.    Scribe Bharath Hutchinson 11:04 AM 2019       OBJECTIVE:  Physical Exam:  /72 (BP Location: Left arm, Patient Position: Sitting, Cuff Size: Adult Regular)   Pulse 89   Ht 1.607 m (5' 3.25\")   Wt 63.5 kg (140 lb)   SpO2 100%   BMI 24.60 kg/m    General Appearance: healthy, alert and no distress   Skin: no suspicious lesions or rashes  Neuro: Normal strength and tone, mentation intact and speech " normal  Vascular: good pulses, and cappillary refill   Lymph: no lymphadenopathy   Psych:  mentation appears normal and affect normal/bright  Resp: no increased work of breathing     Right Knee Exam:  Gait: walks with normal gait  Alignment: normal   Squat: 90 % limited by pain.    Patellofemoral joint: minimal crepitations in the patellofemoral joint. Normal tracking of the patella.   Effusion: mild  ROM: 0-135*, with significant pain.  Tender: medial retinaculum and medial plica  Non-tender: Medial facet of the patella and medial joint line  Ligaments:   Lachman's: Grade 3   Anterior/Posterior drawer: stable,   Varus/Valgus stress: stable to varus and valgus stress  Patellafemoral apprehension: Negative  Q-Angle: Normal  Tubercle sulcus: normal    X-rays:  Recent radiographs were reviewed with the patient, and demonstrate: minimal degenerative changes in the knee.    MRI:    From 12/6/2019 of the right knee showed:  1. Myxoid degenerative change in the posterior horn and mid body of the medial meniscus. No definite tear, but careful probing of this  region is recommended if arthroscopy is performed.  2. Chronic-appearing complete tear of the anterior cruciate ligament at the femoral attachment.  No bone marrow edema to indicate recent ACL injury.  3. Mild sprain medial patellar retinaculum.  4. Small joint space effusion.  5. No bone contusion, marrow edema, or chondromalacia identified.     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  She does not recall a significant knee injury in her past.  Without bone marrow edema, 1 can assume that this is a chronic ACL deficiency.  Another possibility is that her rheumatoid disease could have caused some damage to the ACL, or  weakened it.    Possible medial meniscus tear.    PLAN:   I recommended she attends her physical therapy appointments which she has coming up.  We discussed treatment options, which include: anti-inflammatories, Icing, wrapping and a  hinged knee brace which she would like to do.  We discussed that because of her low demands, age and because of the potentially chronic nature of her ACL tear, that therapy is the best initial option.  Another wrinkle in her picture is the presence of rheumatoid arthritis.  Normally at her age an ACL reconstruction would use an allograft tendon of some type for the reconstruction.  An inflammatory disease could be considered a relative contraindication to using an allograft, or doing ACL RECONSTRUCTION at all.    Return to clinic: 4-6 weeks, if she has instability we will discuss ACL surgery.    GLORIA Canela MD  Dept. Orthopedic Surgery  [facility]

## 2019-12-17 NOTE — LETTER
12/17/2019         RE: Carolina Mora  [address]  [facility] [address]        Dear Colleague,    Thank you for referring your patient, Carolina Mora, to the [facility]. Please see a copy of my visit note below.    SUBJECTIVE:   Carolina Mora is a 59 year old female who is seen in consultation at the request of Jessi Sweeney MD for evaluation of right knee pain. She had an injury on 11/21/2019 and went to urgent care on 11/24/2019 where xrays were taken. The injury occurred while she was trying to avoid a puddle and found herself with her right leg abducted away from her body. She had a valgus force to the knee. She felt sudden pain but she was able to walk with no issues. The next day she was in the yard, a simple pivot caused her knee to give out on her. She reports most of her knee pain today is anteromedially.     Over the last 3 weeks, the right knee has gradually gotten better. After the episode of giving way, it was too painful to put weight on it. However, the past 3 weeks, she adds there might be a feeling of occasional instability when the knee fully extends.     Treatments tried to this point: Knee sleeve, home exercise program and she has 2 physical therapy appointments coming up.     Orthopedic PMH: Rheumatoid Arthritis    Review of Systems:  Constitutional:  NEGATIVE for fever, chills, change in weight  Integumentary/Skin:  NEGATIVE for worrisome rashes, moles or lesions  Eyes:  NEGATIVE for vision changes or irritation  ENT/Mouth:  NEGATIVE for ear, mouth and throat problems  Resp:  NEGATIVE for significant cough or SOB  Breast:  NEGATIVE for masses, tenderness or discharge  CV:  NEGATIVE for chest pain, palpitations or peripheral edema  GI:  NEGATIVE for nausea, abdominal pain, heartburn, or change in bowel habits  :  Negative   Musculoskeletal:  See HPI above  Neuro:  NEGATIVE for weakness, dizziness or paresthesias  Endocrine:  NEGATIVE for temperature  intolerance, skin/hair changes  Heme/allergy/immune:  NEGATIVE for bleeding problems  Psychiatric:  NEGATIVE for changes in mood or affect    Past Medical History:   Past Medical History:   Diagnosis Date     Dysplasia of cervix     had cryotx     Hyperlipidemia LDL goal < 130 2014     Hypothyroidism      Menopause age 48     Nephrolithiasis     s/p ESWL     Rheumatoid arthritis (H) 6/15     Smoker      Ulcerative colitis (H) 1-06     Past Surgical History:   Past Surgical History:   Procedure Laterality Date     COLONOSCOPY  3-14-14     CRYOTHERAPY, CERVICAL  EARLY      HC REMOVAL GALLBLADDER      and repair ventral hernia     Family History:   Family History   Problem Relation Age of Onset     Breast Cancer Mother      Thyroid Disease Mother      Diabetes Maternal Grandmother      Hypertension Maternal Grandmother      Cerebrovascular Disease Maternal Grandmother      Cancer Maternal Grandmother      Hypertension Paternal Grandmother      Breast Cancer Paternal Grandmother      Cancer Paternal Grandmother      Thyroid Disease Sister      Skin Cancer Sister      Asthma Son      Melanoma No family hx of      Social History:   Social History     Tobacco Use     Smoking status: Former Smoker     Types: Cigarettes     Last attempt to quit: 2015     Years since quittin.5     Smokeless tobacco: Never Used     Tobacco comment: 1 pack a week   Substance Use Topics     Alcohol use: Not Currently     Comment: occasional wine (twice per year)     This document serves as a record of the services and decisions personally performed and made by GLORIA Canela MD. It was created on his behalf by Bharath Hutchinson, a trained medical scribe. The creation of this document is based the provider's statements to the medical scribe.    ScribStanton Hutchinson 11:04 AM 2019       OBJECTIVE:  Physical Exam:  /72 (BP Location: Left arm, Patient Position: Sitting, Cuff Size: Adult Regular)    "Pulse 89   Ht 1.607 m (5' 3.25\")   Wt 63.5 kg (140 lb)   SpO2 100%   BMI 24.60 kg/m     General Appearance: healthy, alert and no distress   Skin: no suspicious lesions or rashes  Neuro: Normal strength and tone, mentation intact and speech normal  Vascular: good pulses, and cappillary refill   Lymph: no lymphadenopathy   Psych:  mentation appears normal and affect normal/bright  Resp: no increased work of breathing     Right Knee Exam:  Gait: walks with normal gait  Alignment: normal   Squat: 90 % limited by pain.    Patellofemoral joint: minimal crepitations in the patellofemoral joint. Normal tracking of the patella.   Effusion: mild  ROM: 0-135*, with significant pain.  Tender: medial retinaculum and medial plica  Non-tender: Medial facet of the patella and medial joint line  Ligaments:   Lachman's: Grade 3   Anterior/Posterior drawer: stable,   Varus/Valgus stress: stable to varus and valgus stress  Patellafemoral apprehension: Negative  Q-Angle: Normal  Tubercle sulcus: normal    X-rays:  Recent radiographs were reviewed with the patient, and demonstrate: minimal degenerative changes in the knee.    MRI:    From 12/6/2019 of the right knee showed:  1. Myxoid degenerative change in the posterior horn and mid body of the medial meniscus. No definite tear, but careful probing of this  region is recommended if arthroscopy is performed.  2. Chronic-appearing complete tear of the anterior cruciate ligament at the femoral attachment.  No bone marrow edema to indicate recent ACL injury.  3. Mild sprain medial patellar retinaculum.  4. Small joint space effusion.  5. No bone contusion, marrow edema, or chondromalacia identified.     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  She does not recall a significant knee injury in her past.  Without bone marrow edema, 1 can assume that this is a chronic ACL deficiency.  Another possibility is that her rheumatoid disease could have caused some damage to the " ACL, or  weakened it.    Possible medial meniscus tear.    PLAN:   I recommended she attends her physical therapy appointments which she has coming up.  We discussed treatment options, which include: anti-inflammatories, Icing, wrapping and a hinged knee brace which she would like to do.  We discussed that because of her low demands, age and because of the potentially chronic nature of her ACL tear, that therapy is the best initial option.  Another wrinkle in her picture is the presence of rheumatoid arthritis.  Normally at her age an ACL reconstruction would use an allograft tendon of some type for the reconstruction.  An inflammatory disease could be considered a relative contraindication to using an allograft, or doing ACL RECONSTRUCTION at all.    Return to clinic: 4-6 weeks, if she has instability we will discuss ACL surgery.    GLORIA Canela MD  Dept. Orthopedic Surgery  [facility]     Again, thank you for allowing me to participate in the care of your patient.        Sincerely,        Bandar Canela MD

## 2019-12-23 ENCOUNTER — THERAPY VISIT (OUTPATIENT)
Dept: PHYSICAL THERAPY | Facility: CLINIC | Age: 59
End: 2019-12-23
Attending: PEDIATRICS
Payer: COMMERCIAL

## 2019-12-23 DIAGNOSIS — M25.561 ACUTE PAIN OF RIGHT KNEE: ICD-10-CM

## 2019-12-23 DIAGNOSIS — S83.511A RUPTURE OF ANTERIOR CRUCIATE LIGAMENT OF RIGHT KNEE, INITIAL ENCOUNTER: ICD-10-CM

## 2019-12-23 PROCEDURE — 97110 THERAPEUTIC EXERCISES: CPT | Mod: GP | Performed by: PHYSICAL THERAPIST

## 2019-12-23 PROCEDURE — 97161 PT EVAL LOW COMPLEX 20 MIN: CPT | Mod: GP | Performed by: PHYSICAL THERAPIST

## 2019-12-23 ASSESSMENT — ACTIVITIES OF DAILY LIVING (ADL)
LIMPING: THE SYMPTOM AFFECTS MY ACTIVITY SEVERELY
KNEE_ACTIVITY_OF_DAILY_LIVING_SUM: 14
KNEE_ACTIVITY_OF_DAILY_LIVING_SCORE: 20
GIVING WAY, BUCKLING OR SHIFTING OF KNEE: THE SYMPTOM AFFECTS MY ACTIVITY SEVERELY
WEAKNESS: THE SYMPTOM AFFECTS MY ACTIVITY SEVERELY
WALK: ACTIVITY IS VERY DIFFICULT
STAND: ACTIVITY IS VERY DIFFICULT
GO DOWN STAIRS: ACTIVITY IS VERY DIFFICULT
SIT WITH YOUR KNEE BENT: ACTIVITY IS VERY DIFFICULT
SWELLING: THE SYMPTOM AFFECTS MY ACTIVITY SEVERELY
SQUAT: ACTIVITY IS VERY DIFFICULT
RAW_SCORE: 14
PAIN: THE SYMPTOM AFFECTS MY ACTIVITY SEVERELY
GO UP STAIRS: ACTIVITY IS VERY DIFFICULT
KNEEL ON THE FRONT OF YOUR KNEE: ACTIVITY IS VERY DIFFICULT
STIFFNESS: THE SYMPTOM AFFECTS MY ACTIVITY SEVERELY
RISE FROM A CHAIR: ACTIVITY IS VERY DIFFICULT

## 2019-12-23 NOTE — PROGRESS NOTES
[facility] Initial Evaluation  Subjective:  The history is provided by the patient.   Type of problem:  Right knee   Condition occurred with:  A twist. This is a new condition   Problem details: 11/21/2019.  Legs abducted and pushed with R and felt pain.  Was doing well until today she twisted on knee and it gave out.  Feels like she is back to where she was.  .   Patient reports pain:  Posterior, lateral, anterior and medial.  Associated symptoms:  Buckling/giving out, loss of strength and loss of motion/stiffness (pain increased just today). Symptoms are exacerbated by activity, walking, descending stairs, ascending stairs and bending/squatting and relieved by ice and analgesics.    Where condition occurred: in the community.  and reported as 8/10 on pain scale. General health as reported by patient is good. Pertinent medical history includes:  Rheumatoid arthritis, smoking and thyroid problems.  Medical allergies: other (codeine).  Surgeries include:  Other (eye, kidney stones, gallbladder, hernia).  Current medications:  Thyroid medication.   Primary job tasks include:  Computer work, prolonged sitting and repetitive tasks.  Pain is described as aching and is constant (just since fall today).  Progression since onset: had been improving until today. Special tests:  MRI (old torn ACL). Past treatment: HEP - heel slide and prop;  prone flexion;  SAQ.    Occupation: office/ desk. Restrictions include:  Working in normal job without restrictions.    Barriers include:  None as reported by patient.  Red flags:  None as reported by patient.                      Objective:    Gait:  Arrives in wheelchair today due to incident at dentist today.  Notes that she had been walking normally until today.                                                          Knee Evaluation:  ROM:  Strength wnl knee: limited by pain today.  AROM      Extension:  Left: 0    Right:  34  Flexion: Left: 142    Right:  80          Ligament Testing:  Ligament testing knee: unable to test due to increased pain today.                  Palpation:      Right knee tenderness present at:  Medial Joint Line; Lateral Joint Line and Popliteal  Edema:  Edema of the knee: mild.            General     ROS    Assessment/Plan:    Patient is a 59 year old female with right side knee complaints.    Patient has the following significant findings with corresponding treatment plan.                Diagnosis 1:  R knee pain  Pain -  self management, education, directional preference exercise and home program  Decreased ROM/flexibility - manual therapy and therapeutic exercise  Decreased strength - therapeutic exercise and therapeutic activities  Impaired gait - gait training  Decreased function - therapeutic activities    Therapy Evaluation Codes:   1) History comprised of:   Personal factors that impact the plan of care:      None.    Comorbidity factors that impact the plan of care are:      Rheumatoid arthritis and Smoking.     Medications impacting care: None.  2) Examination of Body Systems comprised of:   Body structures and functions that impact the plan of care:      Knee.   Activity limitations that impact the plan of care are:      Squatting/kneeling, Stairs and Walking.  3) Clinical presentation characteristics are:   Stable/Uncomplicated.  4) Decision-Making    Low complexity using standardized patient assessment instrument and/or measureable assessment of functional outcome.  Cumulative Therapy Evaluation is: Low complexity.    Previous and current functional limitations:  (See Goal Flow Sheet for this information)    Short term and Long term goals: (See Goal Flow Sheet for this information)     Communication ability:  Patient appears to be able to clearly communicate and understand verbal and written communication and follow directions correctly.  Treatment Explanation - The following has been discussed with the patient:   RX ordered/plan  of care  Anticipated outcomes  Possible risks and side effects  This patient would benefit from PT intervention to resume normal activities.   Rehab potential is good.    Frequency:  1 X week, once daily  Duration:  for 6 weeks  Discharge Plan:  Achieve all LTG.  Independent in home treatment program.  Reach maximal therapeutic benefit.    Please refer to the daily flowsheet for treatment today, total treatment time and time spent performing 1:1 timed codes.

## 2019-12-24 PROBLEM — M25.561 ACUTE PAIN OF RIGHT KNEE: Status: ACTIVE | Noted: 2019-12-24

## 2019-12-31 ENCOUNTER — THERAPY VISIT (OUTPATIENT)
Dept: PHYSICAL THERAPY | Facility: CLINIC | Age: 59
End: 2019-12-31
Payer: COMMERCIAL

## 2019-12-31 DIAGNOSIS — M25.561 ACUTE PAIN OF RIGHT KNEE: ICD-10-CM

## 2019-12-31 PROCEDURE — 97110 THERAPEUTIC EXERCISES: CPT | Mod: GP | Performed by: PHYSICAL THERAPIST

## 2019-12-31 ASSESSMENT — ACTIVITIES OF DAILY LIVING (ADL)
SWELLING: THE SYMPTOM AFFECTS MY ACTIVITY MODERATELY
WEAKNESS: THE SYMPTOM AFFECTS MY ACTIVITY SLIGHTLY
SIT WITH YOUR KNEE BENT: ACTIVITY IS MINIMALLY DIFFICULT
RISE FROM A CHAIR: ACTIVITY IS MINIMALLY DIFFICULT
LIMPING: THE SYMPTOM AFFECTS MY ACTIVITY SLIGHTLY
GO UP STAIRS: ACTIVITY IS SOMEWHAT DIFFICULT
STAND: ACTIVITY IS SOMEWHAT DIFFICULT
KNEE_ACTIVITY_OF_DAILY_LIVING_SUM: 39
PAIN: THE SYMPTOM AFFECTS MY ACTIVITY SLIGHTLY
SQUAT: ACTIVITY IS FAIRLY DIFFICULT
KNEE_ACTIVITY_OF_DAILY_LIVING_SCORE: 55.71
KNEEL ON THE FRONT OF YOUR KNEE: ACTIVITY IS FAIRLY DIFFICULT
GIVING WAY, BUCKLING OR SHIFTING OF KNEE: THE SYMPTOM AFFECTS MY ACTIVITY SLIGHTLY
STIFFNESS: THE SYMPTOM AFFECTS MY ACTIVITY MODERATELY
WALK: ACTIVITY IS SOMEWHAT DIFFICULT
GO DOWN STAIRS: ACTIVITY IS FAIRLY DIFFICULT
RAW_SCORE: 39

## 2020-01-31 ENCOUNTER — ALLIED HEALTH/NURSE VISIT (OUTPATIENT)
Dept: NURSING | Facility: CLINIC | Age: 60
End: 2020-01-31
Payer: COMMERCIAL

## 2020-01-31 ENCOUNTER — MYC MEDICAL ADVICE (OUTPATIENT)
Dept: FAMILY MEDICINE | Facility: CLINIC | Age: 60
End: 2020-01-31

## 2020-01-31 DIAGNOSIS — M05.79 RHEUMATOID ARTHRITIS INVOLVING MULTIPLE SITES WITH POSITIVE RHEUMATOID FACTOR (H): ICD-10-CM

## 2020-01-31 DIAGNOSIS — Z79.899 ENCOUNTER FOR LONG-TERM (CURRENT) USE OF HIGH-RISK MEDICATION: ICD-10-CM

## 2020-01-31 DIAGNOSIS — Z79.631 LONG TERM METHOTREXATE USER: ICD-10-CM

## 2020-01-31 DIAGNOSIS — Z23 NEED FOR SHINGLES VACCINE: Primary | ICD-10-CM

## 2020-01-31 LAB
ALBUMIN SERPL-MCNC: 3.6 G/DL (ref 3.4–5)
ALT SERPL W P-5'-P-CCNC: 20 U/L (ref 0–50)
AST SERPL W P-5'-P-CCNC: 16 U/L (ref 0–45)
CREAT SERPL-MCNC: 0.71 MG/DL (ref 0.52–1.04)
ERYTHROCYTE [DISTWIDTH] IN BLOOD BY AUTOMATED COUNT: 13.2 % (ref 10–15)
GFR SERPL CREATININE-BSD FRML MDRD: >90 ML/MIN/{1.73_M2}
HCT VFR BLD AUTO: 40.6 % (ref 35–47)
HGB BLD-MCNC: 13.6 G/DL (ref 11.7–15.7)
MCH RBC QN AUTO: 31.8 PG (ref 26.5–33)
MCHC RBC AUTO-ENTMCNC: 33.5 G/DL (ref 31.5–36.5)
MCV RBC AUTO: 95 FL (ref 78–100)
PLATELET # BLD AUTO: 281 10E9/L (ref 150–450)
RBC # BLD AUTO: 4.28 10E12/L (ref 3.8–5.2)
WBC # BLD AUTO: 5.6 10E9/L (ref 4–11)

## 2020-01-31 PROCEDURE — 90750 HZV VACC RECOMBINANT IM: CPT

## 2020-01-31 PROCEDURE — 85027 COMPLETE CBC AUTOMATED: CPT | Performed by: NURSE PRACTITIONER

## 2020-01-31 PROCEDURE — 90471 IMMUNIZATION ADMIN: CPT

## 2020-01-31 PROCEDURE — 84450 TRANSFERASE (AST) (SGOT): CPT | Performed by: NURSE PRACTITIONER

## 2020-01-31 PROCEDURE — 36415 COLL VENOUS BLD VENIPUNCTURE: CPT | Performed by: NURSE PRACTITIONER

## 2020-01-31 PROCEDURE — 82040 ASSAY OF SERUM ALBUMIN: CPT | Performed by: NURSE PRACTITIONER

## 2020-01-31 PROCEDURE — 82565 ASSAY OF CREATININE: CPT | Performed by: NURSE PRACTITIONER

## 2020-01-31 PROCEDURE — 84460 ALANINE AMINO (ALT) (SGPT): CPT | Performed by: NURSE PRACTITIONER

## 2020-01-31 PROCEDURE — 99207 ZZC NO CHARGE NURSE ONLY: CPT

## 2020-01-31 NOTE — NURSING NOTE
Prior to immunization administration, verified patients identity using patient s name and date of birth. Please see Immunization Activity for additional information.     Screening Questionnaire for Adult Immunization    Are you sick today?   No   Do you have allergies to medications, food, a vaccine component or latex?   Yes   Have you ever had a serious reaction after receiving a vaccination?   No   Do you have a long-term health problem with heart, lung, kidney, or metabolic disease (e.g., diabetes), asthma, a blood disorder, no spleen, complement component deficiency, a cochlear implant, or a spinal fluid leak?  Are you on long-term aspirin therapy?   No   Do you have cancer, leukemia, HIV/AIDS, or any other immune system problem?   No   Do you have a parent, brother, or sister with an immune system problem?   No   In the past 3 months, have you taken medications that affect  your immune system, such as prednisone, other steroids, or anticancer drugs; drugs for the treatment of rheumatoid arthritis, Crohn s disease, or psoriasis; or have you had radiation treatments?   No   Have you had a seizure, or a brain or other nervous system problem?   No   During the past year, have you received a transfusion of blood or blood    products, or been given immune (gamma) globulin or antiviral drug?   No   For women: Are you pregnant or is there a chance you could become       pregnant during the next month?   No   Have you received any vaccinations in the past 4 weeks?   No     Immunization questionnaire was positive for at least one answer.  Notified ALEXSANDRA Markham.        Per orders of Dr. Beltrán, injection of Shingrix vaccines given by Chau Jones MA. Patient instructed to remain in clinic for 15 minutes afterwards, and to report any adverse reaction to me immediately.    Screening performed by Chau Jones MA on 1/31/2020 at 8:03 AM.  VIS for Shingles vaccines given on same date of administration.  Staff  signature/Title: Chau Jones MA on 1/31/2020 at 8:53 AM

## 2020-01-31 NOTE — TELEPHONE ENCOUNTER
Please see ecoATM message.     Routed to PCP to review and advise.     Breanna Agudelo RN, BSN, PHN  [facility] [place]

## 2020-02-26 ENCOUNTER — OFFICE VISIT (OUTPATIENT)
Dept: FAMILY MEDICINE | Facility: CLINIC | Age: 60
End: 2020-02-26
Payer: COMMERCIAL

## 2020-02-26 VITALS
TEMPERATURE: 98.7 F | HEART RATE: 89 BPM | HEIGHT: 63 IN | SYSTOLIC BLOOD PRESSURE: 116 MMHG | OXYGEN SATURATION: 97 % | DIASTOLIC BLOOD PRESSURE: 63 MMHG | BODY MASS INDEX: 25.34 KG/M2 | WEIGHT: 143 LBS

## 2020-02-26 DIAGNOSIS — J06.9 UPPER RESPIRATORY TRACT INFECTION, UNSPECIFIED TYPE: Primary | ICD-10-CM

## 2020-02-26 PROCEDURE — 99213 OFFICE O/P EST LOW 20 MIN: CPT | Performed by: PHYSICIAN ASSISTANT

## 2020-02-26 ASSESSMENT — MIFFLIN-ST. JEOR: SCORE: 1206.73

## 2020-02-26 NOTE — PROGRESS NOTES
Subjective     Carolina Mora is a 59 year old female who presents to clinic today for the following health issues:    HPI   ENT Symptoms             Symptoms: cc Present Absent Comment   Fever/Chills   x    Fatigue  x     Muscle Aches  x     Eye Irritation   x    Sneezing  x     Nasal Alejandro/Drg   x    Sinus Pressure/Pain   x    Loss of smell   x    Dental pain   x    Sore Throat  x     Swollen Glands  x     Ear Pain/Fullness   x    Cough  x     Wheeze   x    Chest Pain   x    Shortness of breath   x    Rash   x    Other    Headache      Symptom duration:  since yesterday    Symptom severity:  moderate   Treatments tried:  advilnyquil    Contacts:  daughter            Patient Active Problem List   Diagnosis     Ulcerative colitis (H)     History of nephrolithiasis     Hyperlipidemia with target LDL less than 130     Ventral hernia without obstruction or gangrene     Rheumatoid arthritis involving multiple sites with positive rheumatoid factor (H)     Encounter for long-term (current) use of high-risk medication     Hypothyroidism due to acquired atrophy of thyroid     Acute pain of right knee     Past Surgical History:   Procedure Laterality Date     COLONOSCOPY  3-14-14     CRYOTHERAPY, CERVICAL  EARLY      HC REMOVAL GALLBLADDER      and repair ventral hernia       Social History     Tobacco Use     Smoking status: Former Smoker     Types: Cigarettes     Last attempt to quit: 2015     Years since quittin.7     Smokeless tobacco: Never Used     Tobacco comment: 1 pack a week   Substance Use Topics     Alcohol use: Not Currently     Comment: occasional wine (twice per year)     Family History   Problem Relation Age of Onset     Breast Cancer Mother      Thyroid Disease Mother      Diabetes Maternal Grandmother      Hypertension Maternal Grandmother      Cerebrovascular Disease Maternal Grandmother      Cancer Maternal Grandmother      Hypertension Paternal Grandmother      Breast Cancer Paternal  "Grandmother      Cancer Paternal Grandmother      Thyroid Disease Sister      Skin Cancer Sister      Asthma Son      Melanoma No family hx of              Reviewed and updated as needed this visit by Provider  Allergies  Meds         Review of Systems   ROS COMP: Constitutional, HEENT, cardiovascular, pulmonary, gi and gu systems are negative, except as otherwise noted.      Objective    /63   Pulse 89   Temp 98.7  F (37.1  C) (Oral)   Ht 1.607 m (5' 3.25\")   Wt 64.9 kg (143 lb)   SpO2 97%   BMI 25.13 kg/m    Body mass index is 25.13 kg/m .  Physical Exam  Constitutional:       General: She is not in acute distress.  HENT:      Right Ear: Tympanic membrane, ear canal and external ear normal.      Left Ear: Tympanic membrane, ear canal and external ear normal.      Mouth/Throat:      Mouth: Mucous membranes are moist.      Pharynx: No oropharyngeal exudate or posterior oropharyngeal erythema.   Cardiovascular:      Rate and Rhythm: Normal rate and regular rhythm.   Pulmonary:      Effort: Pulmonary effort is normal.      Breath sounds: Normal breath sounds.   Neurological:      Mental Status: She is alert.            Diagnostic Test Results:  none         Assessment & Plan     1. Upper respiratory tract infection, unspecified type  Continue symptomatic treatment.  return to clinic if not improving.                Return in about 10 days (around 3/7/2020) for if not improving.    Donna Marcelino PA-C  [facility]      "

## 2020-02-27 PROBLEM — M25.561 ACUTE PAIN OF RIGHT KNEE: Status: RESOLVED | Noted: 2019-12-24 | Resolved: 2020-02-27

## 2020-02-27 NOTE — PROGRESS NOTES
Subjective:  HPI  Physical Exam       Knee Activity of Daily Living Score: 55.71            Objective:  System    Physical Exam    General     ROS    Assessment/Plan:    DISCHARGE REPORT    Progress reporting period is from 12/23/2019 to 12/31/2019.     SUBJECTIVE  Subjective: Feeling better.   Current Pain level: 4/10   Initial Pain level: 8/10        ;   ,     Patient has failed to return to therapy so current objective findings are unknown.  The subjective and objective information are from the last SOAP note on this patient.    OBJECTIVE  Objective: Arrives without any assistive device other than brace.  Flexed knee with gait.      ASSESSMENT/PLAN  Updated problem list and treatment plan:Diagnosis 1:  R knee pain  Pain -  self management, education, directional preference exercise and home program  Decreased ROM/flexibility - manual therapy and therapeutic exercise  Decreased strength - therapeutic exercise and therapeutic activities  Impaired gait - gait training  Decreased function - therapeutic activities      STG/LTGs have been met or progress has been made towards goals:  unknown  Assessment of Progress: The patient has not returned to therapy. Current status is unknown.  Self Management Plans:  Patient has been instructed in a home treatment program.    Carolina continues to require the following intervention to meet STG and LTG's: PT intervention is no longer required to meet STG/LTG.  The patient failed to complete scheduled/ordered appointments so current information is unknown.  We will discharge this patient from PT.    Recommendations:  Pt canceled last two visits and did not reschedule.  Discharge pt from PT.    Please refer to the daily flowsheet for treatment today, total treatment time and time spent performing 1:1 timed codes.

## 2020-03-03 DIAGNOSIS — M05.79 RHEUMATOID ARTHRITIS INVOLVING MULTIPLE SITES WITH POSITIVE RHEUMATOID FACTOR (H): ICD-10-CM

## 2020-04-10 DIAGNOSIS — Z79.631 LONG TERM METHOTREXATE USER: ICD-10-CM

## 2020-04-10 DIAGNOSIS — Z79.899 ENCOUNTER FOR LONG-TERM (CURRENT) USE OF HIGH-RISK MEDICATION: ICD-10-CM

## 2020-04-10 DIAGNOSIS — M05.79 RHEUMATOID ARTHRITIS INVOLVING MULTIPLE SITES WITH POSITIVE RHEUMATOID FACTOR (H): ICD-10-CM

## 2020-04-10 LAB
ALBUMIN SERPL-MCNC: 4 G/DL (ref 3.4–5)
ALT SERPL W P-5'-P-CCNC: 20 U/L (ref 0–50)
AST SERPL W P-5'-P-CCNC: 13 U/L (ref 0–45)
CREAT SERPL-MCNC: 0.74 MG/DL (ref 0.52–1.04)
ERYTHROCYTE [DISTWIDTH] IN BLOOD BY AUTOMATED COUNT: 13.2 % (ref 10–15)
GFR SERPL CREATININE-BSD FRML MDRD: 89 ML/MIN/{1.73_M2}
HCT VFR BLD AUTO: 37.2 % (ref 35–47)
HGB BLD-MCNC: 12.3 G/DL (ref 11.7–15.7)
MCH RBC QN AUTO: 31.3 PG (ref 26.5–33)
MCHC RBC AUTO-ENTMCNC: 33.1 G/DL (ref 31.5–36.5)
MCV RBC AUTO: 95 FL (ref 78–100)
PLATELET # BLD AUTO: 267 10E9/L (ref 150–450)
RBC # BLD AUTO: 3.93 10E12/L (ref 3.8–5.2)
WBC # BLD AUTO: 7.4 10E9/L (ref 4–11)

## 2020-04-10 PROCEDURE — 36415 COLL VENOUS BLD VENIPUNCTURE: CPT | Performed by: NURSE PRACTITIONER

## 2020-04-10 PROCEDURE — 85027 COMPLETE CBC AUTOMATED: CPT | Performed by: NURSE PRACTITIONER

## 2020-04-10 PROCEDURE — 82040 ASSAY OF SERUM ALBUMIN: CPT | Performed by: NURSE PRACTITIONER

## 2020-04-10 PROCEDURE — 82565 ASSAY OF CREATININE: CPT | Performed by: NURSE PRACTITIONER

## 2020-04-10 PROCEDURE — 84450 TRANSFERASE (AST) (SGOT): CPT | Performed by: NURSE PRACTITIONER

## 2020-04-10 PROCEDURE — 84460 ALANINE AMINO (ALT) (SGPT): CPT | Performed by: NURSE PRACTITIONER

## 2020-04-15 ENCOUNTER — VIRTUAL VISIT (OUTPATIENT)
Dept: RHEUMATOLOGY | Facility: CLINIC | Age: 60
End: 2020-04-15
Attending: NURSE PRACTITIONER
Payer: COMMERCIAL

## 2020-04-15 DIAGNOSIS — Z79.631 LONG TERM METHOTREXATE USER: ICD-10-CM

## 2020-04-15 DIAGNOSIS — M05.79 RHEUMATOID ARTHRITIS, SEROPOSITIVE, MULTIPLE SITES (H): Primary | ICD-10-CM

## 2020-04-15 DIAGNOSIS — M25.522 ELBOW PAIN, LEFT: ICD-10-CM

## 2020-04-15 DIAGNOSIS — M05.79 RHEUMATOID ARTHRITIS INVOLVING MULTIPLE SITES WITH POSITIVE RHEUMATOID FACTOR (H): ICD-10-CM

## 2020-04-15 DIAGNOSIS — Z79.899 ENCOUNTER FOR LONG-TERM (CURRENT) USE OF HIGH-RISK MEDICATION: ICD-10-CM

## 2020-04-15 RX ORDER — FOLIC ACID 1 MG/1
1 TABLET ORAL DAILY
Qty: 90 TABLET | Refills: 3 | Status: SHIPPED | OUTPATIENT
Start: 2020-04-15 | End: 2020-10-16

## 2020-04-15 RX ORDER — ADALIMUMAB 40MG/0.4ML
40 KIT SUBCUTANEOUS
Qty: 2 EACH | Refills: 1 | COMMUNITY
Start: 2020-04-15 | End: 2020-04-30

## 2020-04-15 ASSESSMENT — PAIN SCALES - GENERAL: PAINLEVEL: NO PAIN (0)

## 2020-04-15 NOTE — PROGRESS NOTES
"Carolina Mora is a 59 year old female who is being evaluated via a billable telephone visit.      The patient has been notified of following:     \"This telephone visit will be conducted via a call between you and your physician/provider. We have found that certain health care needs can be provided without the need for an in-person physical exam.  This service lets us provide the care you need with a telephone conversation.  If a prescription is necessary we can send it directly to your pharmacy.  If lab work is needed we can place an order for that and you can then stop by our lab to have the test done at a later time.    Telephone visits are billed at different rates depending on your insurance coverage.  Please reach out to your insurance provider with any questions.    If during the course of the call the physician/provider feels a Telephone visit is not appropriate, you will not be charged for this service.\"    Patient has given verbal consent for telephone visit? Yes    How would you like to obtain your AVS? Freddie    Patient would like the telephone invitation sent by: Text to cell phone: [phone]      Additional provider notes:  [facility] - Rheumatology Clinic Visit     Carolina Mora  is a 59 year old here for follow-up rheumatoid arthritis (RA) +RF 49 CCP >205 to co-manage with Rheum MD Dr. Fields. Poor prognosis      Review- +RF 49 CCP >205 -hep b/c 2015 12/2015 -MTB QG; XR hand 8-2016   Past-Pt initially referred to rheumatology 7/2015 for 6m history of polyarticular inflammatory arthritis and hand paresthesias. Found to have +RF/CCP and started on MTX and prednisone with significant improvement. Started on Humira 1/2016 given persistent inflammatory arthritis. Started to titrate down on MTX from 8 to 6 tabs spring 2017 with no flare. Tapered to 7.5 mg/wk in mid-2018. Humira frequency reduced in 2019 every 21 day 6-2019.     Initial visit October 14, 2019  Joe Medina APRN: " "  Rheumatoid arthritis (RA) controlled, no pain or swelling and the right knee no longer swollen this resolved 2-2019. No EAS. Left elbow contracture the same. No flares. No neck pain. No longer has nodules. No sores in the mouth or nose,. GI side-effects or hairloss associated with methotrexate use. Active and walks 30 to 60 minutes daily. She denies alcohol use. No bowel issues now for a long time.      Continue subcutaneous adalimumab (humira) citrate free 40mg every 2 weeks and methotrexate 7.5 mg weekly SAT, folic acid 2 mg day (had GI side-effects at higher doses). No prednisone or NSAID use. Use to take ibuprofen 800 mg QID at dx    April 15, 2020  Ruptured ACL, was deteriotarated. Would not do ACL replacement, uses brace. Did Physical Therapy. Will not be doing surgery. Doing HEP. Not ever had rheumatoid arthritis (RA) in this right knee issues or Inflammatory arthritis. Felt in past maybe from running. No remember injury. Rheumatoid arthritis (RA) controlled, no joint pain or swelling. FROM. No EAS. Doing great. Function is good. Stiff in knee right only walks it. No cyclical wearing down. No UC. Last colonoscopy  no evidence of this UC. Denies any fever, chills, SOB, THOMAS, night sweats, or chest pain, high fever, cough, travel in last 14 days or exposure to covid-19 (coronavirus). Reports healthy. Doing Covid-19 (coronavirus) guidelines. End of Feb was ill \"does not remember being that sick\". Fever, body aches, SOB and her daughter this as well, had cough as well with this. Both went to doc no clear dx --didn't test for this influenza or Covid-19 (coronavirus) at time went in temperature gone and no SOB.     On subcutaneous adalimumab (humira) citrate free 40mg every 3 weeks and methotrexate 7.5 mg weekly SAT, folic acid 2 mg day (had GI side-effects at higher doses). No prednisone or NSAID use. Use to take ibuprofen 800 mg QID at dx       PMH:  Injury-left 4th finger fracture now contracture PIP "   Past Medical History:  Dysplasia of cervix (had cryotx) 1990's  Hypothyroidism (07/31/2014) due to atrophy  Menopause  Nephrolithiasis (s/p ESWL) (01/11)  Rheumatoid arthritis   Ulcerative colitis originally dx after 3rd child 13 yr ago, treated follow-up  Colonoscopies not find now even had she had that not even seeing scar tissue, next check 5 yr 2023      High cholesterol -diet   Smoker    Ventral hernia   DEXA 2017 T-0.7 normal      Past Surgical History:  Colonoscopy (03/14/14)  Cryothearpy, cervical (early 1990's)  HC removal Gallbladder (and repair ventral hernia)(2/01)     Family History:   No psoriasis, UC, crohn's, SLE, RA, PsA, gout, autoimmune thyroid.  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 Right handed.      PMSH personally reviewed and updated by me.     ROS:  Negative raynaud s phenomena, hairloss, sun sensitivities, keratoconjunctivitis sicca, pleurisy, inflammatory joint symptoms, significant rashes like malar, oral/nasal or ulcerations, inflammatory eye disease, inflammatory bowel disease, dactylitis, tenosynovitis, or enthespathy. Negative for miscarriages over 2 months into pregnancy, blood clots, gout, psoriasis, UC, crohn's. No temporal headache, no jaw claudication, no scalp tenderness, vision changes, carotidynia, cough. No STD or pregnancy symptoms. No Parotid swelling. Denies international travel. Denies history of pneumonia, hepatitis, tuberculosis, shingles, sepsis, tick  bites.      CONSTITUTIONAL: No fevers, night sweats or unintentional weight change. No acute distress, swollen glands  EYES: No vision change, diplopia, pain in eyes or red eyes   EARS, NOSE, MOUTH, THROAT: No tinnitus or hearing change, no epistaxis or nasal discharge, no oral lesions, throat clear. Normal saliva pool.  No drymouth. No thyroid enlargement.   CARDIOVASCULAR: No chest pain, palpitations, or pain with walking, no orthopnea or PND   RESPIRATORY: No dyspnea, cough, shortness of breath or wheezing. No pleurisy.   GI: No nausea, vomiting, diarrhea or constipation, no abdominal pain, or blood in stools.   : No change in urine, no dysuria or hematuria   MUSCKL: No swollen, tender, red or painful joints. No nodules. No enthesitis, plantar fascitis or heel pain.   INTEGUMENTARY: No concerning lesions or moles   NEURO: No loss of strength or sensation, no numbness or tingling, no tremor, no dizziness, no headache. No falls   ENDO: No polyuria or polydipsia, no temperature intolerance   HEME/LYMPH:No concerning bumps, bleeding problems, or swollen lymph nodes.   ALLERGY: No environmental allergies   PSYCH:No depression or anxiety, no sleep problems.  Otherwise 14 point ROS obtained, reviewed and found negative.     Assessment via video:    Constitutional: WD-WN-WG cooperative  Eyes: nl EOM, PERRLA, vision, conjunctiva, sclera  ENT: nl external ears, nose, hearing, lips, teeth, gums, throat  Neck: no mass or thyroid enlargement  RESP: No cough, no audible wheezing, able to talk in full sentences  Neuro: nl cranial nerves, strength, sensation  Psych: nl judgement, orientation, memory, affect.  PSYCH: Alert and oriented times 3; coherent speech, normal   rate and volume, able to articulate logical thoughts, able   to abstract reason, no tangential thoughts, no hallucinations   or delusions  His affect is normal  Skin: no nail pitting, alopecia, rash  MS:   mild joint laxity of BL wrists   - L hand 4th PIP slight  contraction flexure 2/2 prior fracture.   - Laxity of PIP joints without synovitis.   - Left elbow shows 10 degrees of extension but full flexion, right elbow is normal.    - L elbow w/ 5 deg contraction flexure w/o active synovitis (unchanged)  - Bony enlargement at first MTP at both sides  All other TMJ, neck, shoulder, elbow, wrist, hand, spine, hip, knee, ankle, and foot joints found normal. No deformity or nodule.  Remainder of exam unable to be completed due to video visits       Labs/Imaging:  Component      Latest Ref Rng & Units 4/10/2020   WBC      4.0 - 11.0 10e9/L 7.4   RBC Count      3.8 - 5.2 10e12/L 3.93   Hemoglobin      11.7 - 15.7 g/dL 12.3   Hematocrit      35.0 - 47.0 % 37.2   MCV      78 - 100 fl 95   MCH      26.5 - 33.0 pg 31.3   MCHC      31.5 - 36.5 g/dL 33.1   RDW      10.0 - 15.0 % 13.2   Platelet Count      150 - 450 10e9/L 267   Creatinine      0.52 - 1.04 mg/dL 0.74   GFR Estimate      >60 mL/min/1.73:m2 89   GFR Estimate If Black      >60 mL/min/1.73:m2 >90   Albumin      3.4 - 5.0 g/dL 4.0   ALT      0 - 50 U/L 20   AST      0 - 45 U/L 13        Impression/Plan:  1. Seropositive RA (RF 49/ACPA 205), dx 7/2015. Hx rheumatoid nodules):  Rheumatoid arthritis (RA) remission. No flare, no longer has nodules and left elbow stable contracture. I recommend continuing methotrexate 7.5mg every 7 days, adalimumab (humira) every 21 days. I asked the patient to monitor for dosing cycle symptoms of stiffness and pain and to report any symptoms of stiffness to our office.     2. High risk medications. Labs every 12 weeks. Up-to-date with the pneumonia vaccines and flu vaccine. Labs are unremarkable in APril 2020; CBC, LFT were all negative or normal.      Plan:  - Continue methotrexate 7.5 mg per week. While on methotrexate:  --q 3 mo AST/ALT, Albumin, CBC with plts  --Limit EtOH intake to 2 drinks weekly; use folate 1 mg daily (reduce by 1 mg).  --Tylenol 500 mg tid prn nausea/HA associated with  dosing.  - adalimumab (HUMIRA PEN) citrate free 40 MG/0.8ML pen kit every 21 days if wears down go to every other week.  - Continue laboratory monitoring every 3-4 months; Cr, ALT, AST, CBC  - If any neck pain or surgeries, would want to do cervical x-rays to look for AA changes or instability      Follow-up: Follow up with me 6 mth, Dr. Fields 1 year.      The patient understood the rationale for the diagnosis and treatment plan. Patient shared in the decision making. All questions were answered to best of my ability and the patient's satisfaction and agrees with the plan.      Joe Medina APRN, CNP, MSN  [facility]  Department of Rheumatology & Autoimmune Disorders    Education:   1. Immunization: Reviewed CDC guidelines with patient updated, information provided to patient based on CDC guidelines for vaccination recommendations, patient will discuss with primary provider  2. Bone Health:Educated on adequate calcium and vitamin D intake, Educated on exercise, Glucocorticoid education discussed risks, benefits & potential long term side effects from use per primary provider  3. Discussed routine annual physicals, cancer screening, cardiac risk monitoring, bone health and primary care with primary care provider. Also, educated glucocorticoid increase change of infections including shingles.   4. Educated on diagnosis, prognosis, labs, imaging, treatment options (including risks, benefits, risk of no treatment), medications (use, dose, side-effects, risks of medications including infection/cancer/bone marrow suppression, lab monitoring), infections what to do, plan of cares, goals of treatment. Clinic cards given. Websites given for education and resources.   5. Educated in Rheumatology we do not prescribe narcotics or manage chronic pain, the patient will need to discuss with primary care or go to a pain clinic for management.   6. Discussed corovid-19 prevention high risk patient  precautions, social distancing, hand washing, follow CDC guidelines/resources and what to do for exposure signs or symptoms and where to go. Goal to avoid or minimize prednisone exposure to reduce infection risks,     Time start 400pm  Time end 415 PM  Duration 15 min

## 2020-04-29 DIAGNOSIS — M05.79 RHEUMATOID ARTHRITIS INVOLVING MULTIPLE SITES WITH POSITIVE RHEUMATOID FACTOR (H): ICD-10-CM

## 2020-04-30 RX ORDER — ADALIMUMAB 40MG/0.4ML
KIT SUBCUTANEOUS
Qty: 2 EACH | Refills: 1 | Status: SHIPPED | OUTPATIENT
Start: 2020-04-30 | End: 2020-09-28

## 2020-06-21 DIAGNOSIS — Z79.631 LONG TERM METHOTREXATE USER: ICD-10-CM

## 2020-06-21 DIAGNOSIS — M05.79 RHEUMATOID ARTHRITIS INVOLVING MULTIPLE SITES WITH POSITIVE RHEUMATOID FACTOR (H): ICD-10-CM

## 2020-06-21 DIAGNOSIS — Z79.899 ENCOUNTER FOR LONG-TERM (CURRENT) USE OF HIGH-RISK MEDICATION: ICD-10-CM

## 2020-06-24 RX ORDER — FOLIC ACID 1 MG/1
TABLET ORAL
Qty: 68 TABLET | Refills: 15 | OUTPATIENT
Start: 2020-06-24

## 2020-07-17 DIAGNOSIS — Z79.899 ENCOUNTER FOR LONG-TERM (CURRENT) USE OF HIGH-RISK MEDICATION: ICD-10-CM

## 2020-07-17 DIAGNOSIS — M05.79 RHEUMATOID ARTHRITIS INVOLVING MULTIPLE SITES WITH POSITIVE RHEUMATOID FACTOR (H): ICD-10-CM

## 2020-07-17 DIAGNOSIS — Z79.631 LONG TERM METHOTREXATE USER: ICD-10-CM

## 2020-07-17 LAB
ALBUMIN SERPL-MCNC: 3.6 G/DL (ref 3.4–5)
ALT SERPL W P-5'-P-CCNC: 17 U/L (ref 0–50)
AST SERPL W P-5'-P-CCNC: 15 U/L (ref 0–45)
CREAT SERPL-MCNC: 0.92 MG/DL (ref 0.52–1.04)
ERYTHROCYTE [DISTWIDTH] IN BLOOD BY AUTOMATED COUNT: 12.8 % (ref 10–15)
GFR SERPL CREATININE-BSD FRML MDRD: 68 ML/MIN/{1.73_M2}
HCT VFR BLD AUTO: 37.8 % (ref 35–47)
HGB BLD-MCNC: 13 G/DL (ref 11.7–15.7)
MCH RBC QN AUTO: 32.2 PG (ref 26.5–33)
MCHC RBC AUTO-ENTMCNC: 34.4 G/DL (ref 31.5–36.5)
MCV RBC AUTO: 94 FL (ref 78–100)
PLATELET # BLD AUTO: 299 10E9/L (ref 150–450)
RBC # BLD AUTO: 4.04 10E12/L (ref 3.8–5.2)
WBC # BLD AUTO: 6.2 10E9/L (ref 4–11)

## 2020-07-17 PROCEDURE — 36415 COLL VENOUS BLD VENIPUNCTURE: CPT | Performed by: NURSE PRACTITIONER

## 2020-07-17 PROCEDURE — 82565 ASSAY OF CREATININE: CPT | Performed by: NURSE PRACTITIONER

## 2020-07-17 PROCEDURE — 82040 ASSAY OF SERUM ALBUMIN: CPT | Performed by: NURSE PRACTITIONER

## 2020-07-17 PROCEDURE — 85027 COMPLETE CBC AUTOMATED: CPT | Performed by: NURSE PRACTITIONER

## 2020-07-17 PROCEDURE — 84460 ALANINE AMINO (ALT) (SGPT): CPT | Performed by: NURSE PRACTITIONER

## 2020-07-17 PROCEDURE — 84450 TRANSFERASE (AST) (SGOT): CPT | Performed by: NURSE PRACTITIONER

## 2020-08-10 DIAGNOSIS — M05.79 RHEUMATOID ARTHRITIS INVOLVING MULTIPLE SITES WITH POSITIVE RHEUMATOID FACTOR (H): ICD-10-CM

## 2020-08-10 DIAGNOSIS — M25.522 ELBOW PAIN, LEFT: ICD-10-CM

## 2020-08-10 DIAGNOSIS — Z79.899 ENCOUNTER FOR LONG-TERM (CURRENT) USE OF HIGH-RISK MEDICATION: ICD-10-CM

## 2020-08-12 ENCOUNTER — MYC REFILL (OUTPATIENT)
Dept: FAMILY MEDICINE | Facility: CLINIC | Age: 60
End: 2020-08-12

## 2020-08-12 DIAGNOSIS — M25.522 ELBOW PAIN, LEFT: ICD-10-CM

## 2020-08-12 DIAGNOSIS — Z79.899 ENCOUNTER FOR LONG-TERM (CURRENT) USE OF HIGH-RISK MEDICATION: ICD-10-CM

## 2020-08-12 DIAGNOSIS — M05.79 RHEUMATOID ARTHRITIS INVOLVING MULTIPLE SITES WITH POSITIVE RHEUMATOID FACTOR (H): ICD-10-CM

## 2020-08-13 NOTE — TELEPHONE ENCOUNTER
methotrexate 2.5 MG tablet  Last Written Prescription Date:  4/15/20  Last Fill Quantity: 36,   # refills: 1  Last Office Visit: 4/15/20  Future Office visit:  10/16/20    CBC RESULTS:   Recent Labs   Lab Test 07/17/20  0851   WBC 6.2   RBC 4.04   HGB 13.0   HCT 37.8   MCV 94   MCH 32.2   MCHC 34.4   RDW 12.8          Creatinine   Date Value Ref Range Status   07/17/2020 0.92 0.52 - 1.04 mg/dL Final   ]    Liver Function Studies -   Recent Labs   Lab Test 07/17/20  0851  09/13/19  0859   PROTTOTAL  --   --  7.2   ALBUMIN 3.6   < > 3.7   BILITOTAL  --   --  0.7   ALKPHOS  --   --  66   AST 15   < > 15   ALT 17   < > 18    < > = values in this interval not displayed.       Routing refill request to provider for review/approval because:provider review.

## 2020-09-01 ENCOUNTER — ANCILLARY PROCEDURE (OUTPATIENT)
Dept: MAMMOGRAPHY | Facility: CLINIC | Age: 60
End: 2020-09-01
Attending: FAMILY MEDICINE
Payer: COMMERCIAL

## 2020-09-01 DIAGNOSIS — Z12.31 VISIT FOR SCREENING MAMMOGRAM: ICD-10-CM

## 2020-09-01 PROCEDURE — 77067 SCR MAMMO BI INCL CAD: CPT | Mod: TC

## 2020-09-01 PROCEDURE — 77063 BREAST TOMOSYNTHESIS BI: CPT | Mod: TC

## 2020-09-24 DIAGNOSIS — M05.79 RHEUMATOID ARTHRITIS INVOLVING MULTIPLE SITES WITH POSITIVE RHEUMATOID FACTOR (H): ICD-10-CM

## 2020-09-28 RX ORDER — ADALIMUMAB 40MG/0.4ML
KIT SUBCUTANEOUS
Qty: 2 EACH | Refills: 0 | Status: SHIPPED | OUTPATIENT
Start: 2020-09-28 | End: 2020-10-16

## 2020-10-12 DIAGNOSIS — Z79.631 LONG TERM METHOTREXATE USER: ICD-10-CM

## 2020-10-12 DIAGNOSIS — M05.79 RHEUMATOID ARTHRITIS INVOLVING MULTIPLE SITES WITH POSITIVE RHEUMATOID FACTOR (H): ICD-10-CM

## 2020-10-12 DIAGNOSIS — Z79.899 ENCOUNTER FOR LONG-TERM (CURRENT) USE OF HIGH-RISK MEDICATION: ICD-10-CM

## 2020-10-12 LAB
ALBUMIN SERPL-MCNC: 3.6 G/DL (ref 3.4–5)
ALT SERPL W P-5'-P-CCNC: 25 U/L (ref 0–50)
AST SERPL W P-5'-P-CCNC: 17 U/L (ref 0–45)
CREAT SERPL-MCNC: 0.76 MG/DL (ref 0.52–1.04)
ERYTHROCYTE [DISTWIDTH] IN BLOOD BY AUTOMATED COUNT: 13.3 % (ref 10–15)
GFR SERPL CREATININE-BSD FRML MDRD: 86 ML/MIN/{1.73_M2}
HCT VFR BLD AUTO: 39.2 % (ref 35–47)
HGB BLD-MCNC: 13.6 G/DL (ref 11.7–15.7)
MCH RBC QN AUTO: 32 PG (ref 26.5–33)
MCHC RBC AUTO-ENTMCNC: 34.7 G/DL (ref 31.5–36.5)
MCV RBC AUTO: 92 FL (ref 78–100)
PLATELET # BLD AUTO: 254 10E9/L (ref 150–450)
RBC # BLD AUTO: 4.25 10E12/L (ref 3.8–5.2)
WBC # BLD AUTO: 5.7 10E9/L (ref 4–11)

## 2020-10-12 PROCEDURE — 85027 COMPLETE CBC AUTOMATED: CPT | Performed by: NURSE PRACTITIONER

## 2020-10-12 PROCEDURE — 84460 ALANINE AMINO (ALT) (SGPT): CPT | Performed by: NURSE PRACTITIONER

## 2020-10-12 PROCEDURE — 82565 ASSAY OF CREATININE: CPT | Performed by: NURSE PRACTITIONER

## 2020-10-12 PROCEDURE — 82040 ASSAY OF SERUM ALBUMIN: CPT | Performed by: NURSE PRACTITIONER

## 2020-10-12 PROCEDURE — 84450 TRANSFERASE (AST) (SGOT): CPT | Performed by: NURSE PRACTITIONER

## 2020-10-15 NOTE — PROGRESS NOTES
Rheumatology Clinic Visit-remote  Augustine Fields M.D.     Carolina Mora MRN# [id number]   YOB: 1960 Age: 60 year old     Date of Visit: October 16, 2020  Primary care provider: Michael Beltrán     Assessment:    # Seropositive RA (RF 49/ACPA 205), dx 7/2015. Hx rheumatoid nodules):  Patient relates continued complete freedom from joint pain, stiffness and swelling. Exam shows only chronic left elbow contracture and no inflammatory changes. Laboratory evaluation on October 12, 2020 showed creatinine, transaminases, CBC all normal.    Rheumatoid arthritis is well controlled. I recommend continuing methotrexate 7.5mg weekly. I recommend a trial of reducing the frequency of Humira from every 3 weeks to every 4 weeks. I asked the patient to monitor for dosing cycle symptoms of stiffness and pain and to report any symptoms of stiffness to our office. If symptoms continue well controlled, she may continue Humira every 28 days until follow up.    While on methotrexate:   -- Check labs every 3 months (AST/ALT, Albumin, CBC with platelets)   -- Limit alcohol intake to 2 drinks weekly; use folate 1 mg daily.  --Tylenol 500-1000 mg can be used as needed up to three times daily for nausea/headache associated with dosing.    # High risk medications. Labs every 12-16 weeks. Up-to-date with the pneumonia vaccines and flu vaccine.    Follow-up: Follow up with Joe Medina in 6 months.      HPI: Carolina Mora  is a 60 year old here for follow-up rheumatoid arthritis (RA) +RF 49 CCP >205.  She was last seen by Joe Medina in April 2020 by video evaluation.  Arthritis was judged in remission at that time, and patient was continued on low-dose methotrexate and Humira every 21 days.     Review- +RF 49 CCP >205 -hep b/c 2015 12/2015 -MTB QG; XR hand 8-2016   Past: Pt initially referred to rheumatology 7/2015 for 6m history of polyarticular inflammatory arthritis and hand paresthesias. Found to have +RF/CCP and started  "on MTX and prednisone with significant improvement. Started on Humira 1/2016 given persistent inflammatory arthritis. Started to titrate down on MTX from 8 to 6 tabs spring 2017 with no flare. Tapered to 7.5 mg/wk in mid-2018. Humira frequency reduced in 2019 every 21 day 6-2019.     Interval history 10-:    She is working from home; health is good. No change in how her joints are feeling despite tapering both humira and methortrexate. Early morning stiffness is 10-15 minutes in the feet and knees. No joint pain. No fevers; appetite is good. No symptoms that cycle with humira dosing. She is working outside in the yard, digging, weeding.     humira q 3 wks; methotrexate 3 tabs per week, stable.    She had a knee injury in 11-19; absence of ACL was diagnosed. she underwent physical therapy and bracing. Knee continues well controlled now.    April 15, 2020  Ruptured ACL, was deteriotarated. Would not do ACL replacement, uses brace. Did Physical Therapy. Will not be doing surgery. Doing HEP. Not ever had rheumatoid arthritis (RA) in this right knee issues or Inflammatory arthritis. Felt in past maybe from running. No remember injury. Rheumatoid arthritis (RA) controlled, no joint pain or swelling. FROM. No EAS. Doing great. Function is good. Stiff in knee right only walks it. No cyclical wearing down. No UC. Last colonoscopy  no evidence of this UC. Denies any fever, chills, SOB, THOMAS, night sweats, or chest pain, high fever, cough, travel in last 14 days or exposure to covid-19 (coronavirus). Reports healthy. Doing Covid-19 (coronavirus) guidelines. End of Feb was ill \"does not remember being that sick\". Fever, body aches, SOB and her daughter this as well, had cough as well with this. Both went to doc no clear dx --didn't test for this influenza or Covid-19 (coronavirus) at time went in temperature gone and no SOB.     On subcutaneous adalimumab (humira) citrate free 40mg every 3 weeks and methotrexate " 7.5 mg weekly SAT, folic acid 2 mg day (had GI side-effects at higher doses). No prednisone or NSAID use. Use to take ibuprofen 800 mg QID at dx       PMH:  Injury-left 4th finger fracture now contracture PIP   Past Medical History:  Dysplasia of cervix (had cryotx) 1990's  Hypothyroidism (07/31/2014) due to atrophy  Menopause  Nephrolithiasis (s/p ESWL) (01/11)  Rheumatoid arthritis   Ulcerative colitis originally dx after 3rd child 13 yr ago, treated follow-up  Colonoscopies not find now even had she had that not even seeing scar tissue, next check 5 yr 2023      High cholesterol -diet   Smoker    Ventral hernia   DEXA 2017 T-0.7 normal      Past Surgical History:  Colonoscopy (03/14/14)  Cryothearpy, cervical (early 1990's)  HC removal Gallbladder (and repair ventral hernia)(2/01)     Family History:   No psoriasis, UC, crohn's, SLE, RA, PsA, gout, autoimmune thyroid.  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 Right handed.      Current Outpatient Medications   Medication     adalimumab (HUMIRA *CF* PEN) 40 MG/0.4ML pen kit     folic acid (FOLVITE) 1 MG tablet     levothyroxine (SYNTHROID/LEVOTHROID) 112 MCG tablet     methotrexate 2.5 MG tablet     No current facility-administered medications for this visit.         ROS:  Negative raynaud s phenomena, hairloss, sun sensitivities, keratoconjunctivitis sicca, pleurisy, inflammatory joint symptoms, significant rashes like malar, oral/nasal or ulcerations,  inflammatory eye disease, inflammatory bowel disease, dactylitis, tenosynovitis, or enthespathy. Negative for miscarriages over 2 months into pregnancy, blood clots, gout, psoriasis, UC, crohn's. No temporal headache, no jaw claudication, no scalp tenderness, vision changes, carotidynia, cough. No STD or pregnancy symptoms. No Parotid swelling. Denies international travel. Denies history of pneumonia, hepatitis, tuberculosis, shingles, sepsis, tick bites.      CONSTITUTIONAL: No fevers, night sweats or unintentional weight change. No acute distress, swollen glands  EYES: No vision change, diplopia, pain in eyes or red eyes   EARS, NOSE, MOUTH, THROAT: No tinnitus or hearing change, no epistaxis or nasal discharge, no oral lesions, throat clear. Normal saliva pool.  No drymouth. No thyroid enlargement.   CARDIOVASCULAR: No chest pain, palpitations, or pain with walking, no orthopnea or PND   RESPIRATORY: No dyspnea, cough, shortness of breath or wheezing. No pleurisy.   GI: No nausea, vomiting, diarrhea or constipation, no abdominal pain, or blood in stools.   : No change in urine, no dysuria or hematuria   MUSCKL: No swollen, tender, red or painful joints. No nodules. No enthesitis, plantar fascitis or heel pain.   INTEGUMENTARY: No concerning lesions or moles   NEURO: No loss of strength or sensation, no numbness or tingling, no tremor, no dizziness, no headache. No falls   ENDO: No polyuria or polydipsia, no temperature intolerance   HEME/LYMPH:No concerning bumps, bleeding problems, or swollen lymph nodes.   ALLERGY: No environmental allergies   PSYCH:No depression or anxiety, no sleep problems.  Otherwise 14 point ROS obtained, reviewed and found negative.     Assessment via video:    Constitutional: WD-WN-WG cooperative  Eyes: nl EOM, PERRLA, vision, conjunctiva, sclera  ENT: nl external ears, nose, hearing, lips, teeth, gums, throat  Neck: no mass or thyroid enlargement  RESP: No cough, no audible wheezing,  "able to talk in full sentences  Neuro: nl cranial nerves, strength, sensation  Psych: nl judgement, orientation, memory, affect.  PSYCH: Alert and oriented times 3; coherent speech, normal   rate and volume, able to articulate logical thoughts, able   to abstract reason, no tangential thoughts, no hallucinations   or delusions  His affect is normal  Skin: no nail pitting, alopecia, rash  MS:   mild joint laxity of BL wrists   - L hand 4th PIP slight contraction flexure 2/2 prior fracture.   - Laxity of PIP joints without synovitis.   - Left elbow shows 10 degrees of extension but full flexion, right elbow is normal.    - L elbow w/ 5 deg contraction flexure w/o active synovitis (unchanged)  - Bony enlargement at first MTP at both sides    Remainder of exam unable to be completed due to video visits       Labs/Imaging:  Component      Latest Ref Rng & Units 4/10/2020   WBC      4.0 - 11.0 10e9/L 7.4   RBC Count      3.8 - 5.2 10e12/L 3.93   Hemoglobin      11.7 - 15.7 g/dL 12.3   Hematocrit      35.0 - 47.0 % 37.2   MCV      78 - 100 fl 95   MCH      26.5 - 33.0 pg 31.3   MCHC      31.5 - 36.5 g/dL 33.1   RDW      10.0 - 15.0 % 13.2   Platelet Count      150 - 450 10e9/L 267   Creatinine      0.52 - 1.04 mg/dL 0.74   GFR Estimate      >60 mL/min/1.73:m2 89   GFR Estimate If Black      >60 mL/min/1.73:m2 >90   Albumin      3.4 - 5.0 g/dL 4.0   ALT      0 - 50 U/L 20   AST      0 - 45 U/L 13         Carolina Mora is a 60 year old female who is being evaluated via a billable video visit.      The patient has been notified of following:     \"This video visit will be conducted via a call between you and your physician/provider. We have found that certain health care needs can be provided without the need for an in-person physical exam.  This service lets us provide the care you need with a video conversation.  If a prescription is necessary we can send it directly to your pharmacy.  If lab work is needed we can place an " "order for that and you can then stop by our lab to have the test done at a later time.    Video visits are billed at different rates depending on your insurance coverage.  Please reach out to your insurance provider with any questions.    If during the course of the call the physician/provider feels a video visit is not appropriate, you will not be charged for this service.\"    Patient has given verbal consent for Video visit? Yes  How would you like to obtain your AVS? MyChart     Will anyone else be joining your video visit? No        Video-Visit Details    Type of service:  Video Visit    Video Start Time: 7:51 AM  Video End Time: 8:10 AM    Originating Location (pt. Location): Home    Distant Location (provider location):  [facility] [place]     Platform used for Video Visit: Jovanny Fields MD        "

## 2020-10-16 ENCOUNTER — VIRTUAL VISIT (OUTPATIENT)
Dept: RHEUMATOLOGY | Facility: CLINIC | Age: 60
End: 2020-10-16
Attending: INTERNAL MEDICINE
Payer: COMMERCIAL

## 2020-10-16 DIAGNOSIS — M05.79 RHEUMATOID ARTHRITIS INVOLVING MULTIPLE SITES WITH POSITIVE RHEUMATOID FACTOR (H): ICD-10-CM

## 2020-10-16 DIAGNOSIS — Z79.899 ENCOUNTER FOR LONG-TERM (CURRENT) USE OF HIGH-RISK MEDICATION: ICD-10-CM

## 2020-10-16 DIAGNOSIS — M25.522 ELBOW PAIN, LEFT: ICD-10-CM

## 2020-10-16 DIAGNOSIS — Z79.631 LONG TERM METHOTREXATE USER: ICD-10-CM

## 2020-10-16 PROCEDURE — 99214 OFFICE O/P EST MOD 30 MIN: CPT | Mod: 95 | Performed by: INTERNAL MEDICINE

## 2020-10-16 RX ORDER — FOLIC ACID 1 MG/1
1 TABLET ORAL DAILY
Qty: 90 TABLET | Refills: 3 | Status: SHIPPED | OUTPATIENT
Start: 2020-10-16 | End: 2021-04-14

## 2020-10-16 RX ORDER — ADALIMUMAB 40MG/0.4ML
KIT SUBCUTANEOUS
Qty: 2 EACH | Refills: 4 | Status: SHIPPED | OUTPATIENT
Start: 2020-10-16 | End: 2021-02-21

## 2020-10-16 ASSESSMENT — PAIN SCALES - GENERAL: PAINLEVEL: NO PAIN (0)

## 2020-10-16 NOTE — LETTER
10/16/2020       RE: Carolina Mora  [address]  [facility] [address]     Dear Colleague,    Thank you for referring your patient, Carolina Mora, to the [facility] RHEUMATOLOGY CLINIC [place] at [facility]. Please see a copy of my visit note below.    Rheumatology Clinic Visit-remote  Augustine Fields M.D.     Carolina Mora MRN# [id number]   YOB: 1960 Age: 60 year old     Date of Visit: October 16, 2020  Primary care provider: Michael Beltrán     Assessment:    # Seropositive RA (RF 49/ACPA 205), dx 7/2015. Hx rheumatoid nodules):  Patient relates continued complete freedom from joint pain, stiffness and swelling. Exam shows only chronic left elbow contracture and no inflammatory changes. Laboratory evaluation on October 12, 2020 showed creatinine, transaminases, CBC all normal.    Rheumatoid arthritis is well controlled. I recommend continuing methotrexate 7.5mg weekly. I recommend a trial of reducing the frequency of Humira from every 3 weeks to every 4 weeks. I asked the patient to monitor for dosing cycle symptoms of stiffness and pain and to report any symptoms of stiffness to our office. If symptoms continue well controlled, she may continue Humira every 28 days until follow up.    While on methotrexate:   -- Check labs every 3 months (AST/ALT, Albumin, CBC with platelets)   -- Limit alcohol intake to 2 drinks weekly; use folate 1 mg daily.  --Tylenol 500-1000 mg can be used as needed up to three times daily for nausea/headache associated with dosing.    # High risk medications. Labs every 12-16 weeks. Up-to-date with the pneumonia vaccines and flu vaccine.    Follow-up: Follow up with Joe Medina in 6 months.      HPI: Carolina Mora  is a 60 year old here for follow-up rheumatoid arthritis (RA) +RF 49 CCP >205.  She was last seen by Joe Medina in April 2020 by video evaluation.  Arthritis was judged in remission at that time,  "and patient was continued on low-dose methotrexate and Humira every 21 days.     Review- +RF 49 CCP >205 -hep b/c 2015 12/2015 -MTB QG; XR hand 8-2016   Past: Pt initially referred to rheumatology 7/2015 for 6m history of polyarticular inflammatory arthritis and hand paresthesias. Found to have +RF/CCP and started on MTX and prednisone with significant improvement. Started on Humira 1/2016 given persistent inflammatory arthritis. Started to titrate down on MTX from 8 to 6 tabs spring 2017 with no flare. Tapered to 7.5 mg/wk in mid-2018. Humira frequency reduced in 2019 every 21 day 6-2019.     Interval history 10-:    She is working from home; health is good. No change in how her joints are feeling despite tapering both humira and methortrexate. Early morning stiffness is 10-15 minutes in the feet and knees. No joint pain. No fevers; appetite is good. No symptoms that cycle with humira dosing. She is working outside in the yard, digging, weeding.     humira q 3 wks; methotrexate 3 tabs per week, stable.    She had a knee injury in 11-19; absence of ACL was diagnosed. she underwent physical therapy and bracing. Knee continues well controlled now.    April 15, 2020  Ruptured ACL, was deteriotarated. Would not do ACL replacement, uses brace. Did Physical Therapy. Will not be doing surgery. Doing HEP. Not ever had rheumatoid arthritis (RA) in this right knee issues or Inflammatory arthritis. Felt in past maybe from running. No remember injury. Rheumatoid arthritis (RA) controlled, no joint pain or swelling. FROM. No EAS. Doing great. Function is good. Stiff in knee right only walks it. No cyclical wearing down. No UC. Last colonoscopy  no evidence of this UC. Denies any fever, chills, SOB, THOMAS, night sweats, or chest pain, high fever, cough, travel in last 14 days or exposure to covid-19 (coronavirus). Reports healthy. Doing Covid-19 (coronavirus) guidelines. End of Feb was ill \"does not remember being " "that sick\". Fever, body aches, SOB and her daughter this as well, had cough as well with this. Both went to doc no clear dx --didn't test for this influenza or Covid-19 (coronavirus) at time went in temperature gone and no SOB.     On subcutaneous adalimumab (humira) citrate free 40mg every 3 weeks and methotrexate 7.5 mg weekly SAT, folic acid 2 mg day (had GI side-effects at higher doses). No prednisone or NSAID use. Use to take ibuprofen 800 mg QID at dx       PMH:  Injury-left 4th finger fracture now contracture PIP   Past Medical History:  Dysplasia of cervix (had cryotx) 1990's  Hypothyroidism (07/31/2014) due to atrophy  Menopause  Nephrolithiasis (s/p ESWL) (01/11)  Rheumatoid arthritis   Ulcerative colitis originally dx after 3rd child 13 yr ago, treated follow-up  Colonoscopies not find now even had she had that not even seeing scar tissue, next check 5 yr 2023      High cholesterol -diet   Smoker    Ventral hernia   DEXA 2017 T-0.7 normal      Past Surgical History:  Colonoscopy (03/14/14)  Cryothearpy, cervical (early 1990's)  HC removal Gallbladder (and repair ventral hernia)(2/01)     Family History:   No psoriasis, UC, crohn's, SLE, RA, PsA, gout, autoimmune thyroid.  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 Right handed.      Current Outpatient Medications   Medication     adalimumab (HUMIRA *CF* PEN) 40 MG/0.4ML pen kit     folic acid (FOLVITE) 1 MG tablet     levothyroxine " (SYNTHROID/LEVOTHROID) 112 MCG tablet     methotrexate 2.5 MG tablet     No current facility-administered medications for this visit.         ROS:  Negative raynaud s phenomena, hairloss, sun sensitivities, keratoconjunctivitis sicca, pleurisy, inflammatory joint symptoms, significant rashes like malar, oral/nasal or ulcerations, inflammatory eye disease, inflammatory bowel disease, dactylitis, tenosynovitis, or enthespathy. Negative for miscarriages over 2 months into pregnancy, blood clots, gout, psoriasis, UC, crohn's. No temporal headache, no jaw claudication, no scalp tenderness, vision changes, carotidynia, cough. No STD or pregnancy symptoms. No Parotid swelling. Denies international travel. Denies history of pneumonia, hepatitis, tuberculosis, shingles, sepsis, tick bites.      CONSTITUTIONAL: No fevers, night sweats or unintentional weight change. No acute distress, swollen glands  EYES: No vision change, diplopia, pain in eyes or red eyes   EARS, NOSE, MOUTH, THROAT: No tinnitus or hearing change, no epistaxis or nasal discharge, no oral lesions, throat clear. Normal saliva pool.  No drymouth. No thyroid enlargement.   CARDIOVASCULAR: No chest pain, palpitations, or pain with walking, no orthopnea or PND   RESPIRATORY: No dyspnea, cough, shortness of breath or wheezing. No pleurisy.   GI: No nausea, vomiting, diarrhea or constipation, no abdominal pain, or blood in stools.   : No change in urine, no dysuria or hematuria   MUSCKL: No swollen, tender, red or painful joints. No nodules. No enthesitis, plantar fascitis or heel pain.   INTEGUMENTARY: No concerning lesions or moles   NEURO: No loss of strength or sensation, no numbness or tingling, no tremor, no dizziness, no headache. No falls   ENDO: No polyuria or polydipsia, no temperature intolerance   HEME/LYMPH:No concerning bumps, bleeding problems, or swollen lymph nodes.   ALLERGY: No environmental allergies   PSYCH:No depression or anxiety, no sleep  "problems.  Otherwise 14 point ROS obtained, reviewed and found negative.     Assessment via video:    Constitutional: WD-WN-WG cooperative  Eyes: nl EOM, PERRLA, vision, conjunctiva, sclera  ENT: nl external ears, nose, hearing, lips, teeth, gums, throat  Neck: no mass or thyroid enlargement  RESP: No cough, no audible wheezing, able to talk in full sentences  Neuro: nl cranial nerves, strength, sensation  Psych: nl judgement, orientation, memory, affect.  PSYCH: Alert and oriented times 3; coherent speech, normal   rate and volume, able to articulate logical thoughts, able   to abstract reason, no tangential thoughts, no hallucinations   or delusions  His affect is normal  Skin: no nail pitting, alopecia, rash  MS:   mild joint laxity of BL wrists   - L hand 4th PIP slight contraction flexure 2/2 prior fracture.   - Laxity of PIP joints without synovitis.   - Left elbow shows 10 degrees of extension but full flexion, right elbow is normal.    - L elbow w/ 5 deg contraction flexure w/o active synovitis (unchanged)  - Bony enlargement at first MTP at both sides    Remainder of exam unable to be completed due to video visits       Labs/Imaging:  Component      Latest Ref Rng & Units 4/10/2020   WBC      4.0 - 11.0 10e9/L 7.4   RBC Count      3.8 - 5.2 10e12/L 3.93   Hemoglobin      11.7 - 15.7 g/dL 12.3   Hematocrit      35.0 - 47.0 % 37.2   MCV      78 - 100 fl 95   MCH      26.5 - 33.0 pg 31.3   MCHC      31.5 - 36.5 g/dL 33.1   RDW      10.0 - 15.0 % 13.2   Platelet Count      150 - 450 10e9/L 267   Creatinine      0.52 - 1.04 mg/dL 0.74   GFR Estimate      >60 mL/min/1.73:m2 89   GFR Estimate If Black      >60 mL/min/1.73:m2 >90   Albumin      3.4 - 5.0 g/dL 4.0   ALT      0 - 50 U/L 20   AST      0 - 45 U/L 13         Carolina Mora is a 60 year old female who is being evaluated via a billable video visit.      The patient has been notified of following:     \"This video visit will be conducted via a call between " "you and your physician/provider. We have found that certain health care needs can be provided without the need for an in-person physical exam.  This service lets us provide the care you need with a video conversation.  If a prescription is necessary we can send it directly to your pharmacy.  If lab work is needed we can place an order for that and you can then stop by our lab to have the test done at a later time.    Video visits are billed at different rates depending on your insurance coverage.  Please reach out to your insurance provider with any questions.    If during the course of the call the physician/provider feels a video visit is not appropriate, you will not be charged for this service.\"    Patient has given verbal consent for Video visit? Yes  How would you like to obtain your AVS? MyChart     Will anyone else be joining your video visit? No        Video-Visit Details    Type of service:  Video Visit    Video Start Time: 7:51 AM  Video End Time: 8:10 AM    Originating Location (pt. Location): Home    Distant Location (provider location):  [facility] RHEUMATOLOGY CLINIC [place]     Platform used for Video Visit: Jovanny Fields MD        "

## 2020-10-16 NOTE — PATIENT INSTRUCTIONS
Diagnosis:  1.  Rheumatoid arthritis with positive rheumatoid factor: No symptoms to suggest either joint inflammation or inflammation in the body as a whole.  Rheumatoid arthritis remains in remission, and I think that further cautious tapering of the Humira is warranted.  Continuation of combination treatment is optimal for suppressing symptoms and protecting joints from damage.  2.  Knee pain and instability since 2019 associated with anterior cruciate ligament absence/tear.  I do not think that active rheumatoid arthritis is the cause of anterior cruciate pathology.  I agree with continued conservative therapy with bracing and physical therapy.    Plan:  1.  Continue methotrexate 7.5 mg once weekly. While on methotrexate:   -- Check labs every 3 months (AST/ALT, Albumin, CBC with platelets)   -- Limit alcohol intake to 2 drinks weekly; use folate 1 mg daily.  --Tylenol 500-1000 mg can be used as needed up to three times daily for nausea/headache associated with dosing.  2.  Continue Humira 40 mg subcutaneously; make a trial of decreasing frequency to every 4 weeks.  3.  Continue knee bracing while physically active, and perform dedicated quadricep strengthening exercises daily.

## 2020-10-22 ENCOUNTER — TELEPHONE (OUTPATIENT)
Dept: RHEUMATOLOGY | Facility: CLINIC | Age: 60
End: 2020-10-22

## 2020-10-22 DIAGNOSIS — M05.79 RHEUMATOID ARTHRITIS INVOLVING MULTIPLE SITES WITH POSITIVE RHEUMATOID FACTOR (H): ICD-10-CM

## 2020-10-22 DIAGNOSIS — M25.522 ELBOW PAIN, LEFT: ICD-10-CM

## 2020-10-22 DIAGNOSIS — Z79.899 ENCOUNTER FOR LONG-TERM (CURRENT) USE OF HIGH-RISK MEDICATION: ICD-10-CM

## 2020-10-22 NOTE — TELEPHONE ENCOUNTER
Central Prior Authorization Team   [phone]    PA Initiation    Medication: Alternative Requested - methotrexate 2.5 MG tablet  Insurance Company: [facility] [employer] - Phone [phone] Fax [phone]  Pharm[id number] [place], [place] - [address]  Filling Pharmacy Phone: [phone]  Filling Pharmacy Fax: [phone]  Start Date: 10/22/2020    Initiate PA by phone at [phone]. Case # [id number]

## 2020-10-22 NOTE — TELEPHONE ENCOUNTER
Prior Authorization Approval    Authorization Effective Date: 10/22/2020  Authorization Expiration Date: 10/22/2021  Medication: Alternative Requested - methotrexate 2.5 MG tablet-PA APPROVED   Approved Dose/Quantity:   Reference #:     Insurance Company: [facility] [employer] - Phone [phone] Fax [phone]  Expected CoPay:       CoPay Card Available:      Foundation Assistance Needed:    Which Pharmacy is filling the prescription (Not needed for infusion/clinic administered): [id number] [place], [place] - [address]  Pharmacy Notified: Yes- **Instructed pharmacy to notify patient when script is ready to /ship.**  Patient Notified: Yes

## 2020-11-13 DIAGNOSIS — E03.4 HYPOTHYROIDISM DUE TO ACQUIRED ATROPHY OF THYROID: ICD-10-CM

## 2020-11-16 RX ORDER — LEVOTHYROXINE SODIUM 112 UG/1
TABLET ORAL
Qty: 90 TABLET | Refills: 0 | Status: SHIPPED | OUTPATIENT
Start: 2020-11-16 | End: 2020-11-24

## 2020-11-16 NOTE — TELEPHONE ENCOUNTER
"Requested Prescriptions   Pending Prescriptions Disp Refills    levothyroxine (SYNTHROID/LEVOTHROID) 112 MCG tablet [Pharmacy Med Name: LEVOTHYROXINE 112 MCG TABLET] 90 tablet 3     Sig: TAKE 1 TABLET BY MOUTH DAILY       Thyroid Protocol Failed - 11/13/2020  1:22 AM        Failed - Normal TSH on file in past 12 months     Recent Labs   Lab Test 09/13/19  0859   TSH 0.57              Passed - Patient is 12 years or older        Passed - Recent (12 mo) or future (30 days) visit within the authorizing provider's specialty     Patient has had an office visit with the authorizing provider or a provider within the authorizing providers department within the previous 12 mos or has a future within next 30 days. See \"Patient Info\" tab in inbasket, or \"Choose Columns\" in Meds & Orders section of the refill encounter.              Passed - Medication is active on med list        Passed - No active pregnancy on record     If patient is pregnant or has had a positive pregnancy test, please check TSH.          Passed - No positive pregnancy test in past 12 months     If patient is pregnant or has had a positive pregnancy test, please check TSH.             Routing refill request to provider for review/approval because:  Failed protocol.  Karyn VOSS-RN  [facility]    "

## 2020-11-22 ASSESSMENT — ENCOUNTER SYMPTOMS
HEARTBURN: 0
HEADACHES: 0
FEVER: 0
WEAKNESS: 0
FREQUENCY: 0
SHORTNESS OF BREATH: 0
CHILLS: 0
EYE PAIN: 0
COUGH: 0
ARTHRALGIAS: 0
NERVOUS/ANXIOUS: 0
BREAST MASS: 0
SORE THROAT: 0
MYALGIAS: 0
PALPITATIONS: 0
PARESTHESIAS: 0
CONSTIPATION: 0
JOINT SWELLING: 0
DIARRHEA: 0
NAUSEA: 0
DYSURIA: 0
DIZZINESS: 0
HEMATOCHEZIA: 0
ABDOMINAL PAIN: 0
HEMATURIA: 0

## 2020-11-23 ENCOUNTER — OFFICE VISIT (OUTPATIENT)
Dept: FAMILY MEDICINE | Facility: CLINIC | Age: 60
End: 2020-11-23
Payer: COMMERCIAL

## 2020-11-23 VITALS
TEMPERATURE: 97.8 F | WEIGHT: 157.13 LBS | SYSTOLIC BLOOD PRESSURE: 129 MMHG | BODY MASS INDEX: 27.84 KG/M2 | HEART RATE: 69 BPM | HEIGHT: 63 IN | DIASTOLIC BLOOD PRESSURE: 77 MMHG

## 2020-11-23 DIAGNOSIS — E03.9 HYPOTHYROIDISM, UNSPECIFIED TYPE: ICD-10-CM

## 2020-11-23 DIAGNOSIS — Z00.00 ROUTINE GENERAL MEDICAL EXAMINATION AT A HEALTH CARE FACILITY: Primary | ICD-10-CM

## 2020-11-23 DIAGNOSIS — Z12.11 SCREEN FOR COLON CANCER: ICD-10-CM

## 2020-11-23 DIAGNOSIS — E78.5 HYPERLIPIDEMIA LDL GOAL <100: ICD-10-CM

## 2020-11-23 DIAGNOSIS — Z13.1 SCREENING FOR DIABETES MELLITUS: ICD-10-CM

## 2020-11-23 LAB
CHOLEST SERPL-MCNC: 222 MG/DL
HBA1C MFR BLD: 4.9 % (ref 0–5.6)
HDLC SERPL-MCNC: 58 MG/DL
LDLC SERPL CALC-MCNC: 142 MG/DL
NONHDLC SERPL-MCNC: 164 MG/DL
T4 FREE SERPL-MCNC: 1.33 NG/DL (ref 0.76–1.46)
TRIGL SERPL-MCNC: 108 MG/DL
TSH SERPL DL<=0.005 MIU/L-ACNC: 0.28 MU/L (ref 0.4–4)

## 2020-11-23 PROCEDURE — 84443 ASSAY THYROID STIM HORMONE: CPT | Performed by: FAMILY MEDICINE

## 2020-11-23 PROCEDURE — 83036 HEMOGLOBIN GLYCOSYLATED A1C: CPT | Performed by: FAMILY MEDICINE

## 2020-11-23 PROCEDURE — 84439 ASSAY OF FREE THYROXINE: CPT | Performed by: FAMILY MEDICINE

## 2020-11-23 PROCEDURE — 80061 LIPID PANEL: CPT | Performed by: FAMILY MEDICINE

## 2020-11-23 PROCEDURE — 99396 PREV VISIT EST AGE 40-64: CPT | Performed by: FAMILY MEDICINE

## 2020-11-23 PROCEDURE — 36415 COLL VENOUS BLD VENIPUNCTURE: CPT | Performed by: FAMILY MEDICINE

## 2020-11-23 ASSESSMENT — MIFFLIN-ST. JEOR: SCORE: 1265.8

## 2020-11-23 ASSESSMENT — ENCOUNTER SYMPTOMS
HEARTBURN: 0
WEAKNESS: 0
DYSURIA: 0
HEADACHES: 0
NERVOUS/ANXIOUS: 0
PALPITATIONS: 0
ABDOMINAL PAIN: 0
FREQUENCY: 0
COUGH: 0
BREAST MASS: 0
DIARRHEA: 0
PARESTHESIAS: 0
SHORTNESS OF BREATH: 0
FEVER: 0
SORE THROAT: 0
NAUSEA: 0
JOINT SWELLING: 0
EYE PAIN: 0
CONSTIPATION: 0
HEMATOCHEZIA: 0
DIZZINESS: 0
MYALGIAS: 0
HEMATURIA: 0
CHILLS: 0
ARTHRALGIAS: 0

## 2020-11-23 NOTE — PATIENT INSTRUCTIONS
Keep working on healthy diet/exercise     We will sent you lab results    Call to schedule colonoscopy

## 2020-11-23 NOTE — PROGRESS NOTES
SUBJECTIVE:   CC: Carolina Mora is an 60 year old woman who presents for preventive health visit.        Patient has been advised of split billing requirements and indicates understanding: Yes  Healthy Habits:     Getting at least 3 servings of Calcium per day:  NO    Bi-annual eye exam:  Yes    Dental care twice a year:  Yes    Sleep apnea or symptoms of sleep apnea:  None    Diet:  Regular (no restrictions)    Frequency of exercise:  6-7 days/week    Duration of exercise:  Greater than 60 minutes    Taking medications regularly:  Yes    Medication side effects:  None    PHQ-2 Total Score: 0    Additional concerns today:  No               Today's PHQ-2 Score:   PHQ-2 (  [employer]) 2020   Q1: Little interest or pleasure in doing things 0   Q2: Feeling down, depressed or hopeless 0   PHQ-2 Score 0   Q1: Little interest or pleasure in doing things Not at all   Q2: Feeling down, depressed or hopeless Not at all   PHQ-2 Score 0       Abuse: Current or Past (Physical, Sexual or Emotional) - No  Do you feel safe in your environment? Yes        Social History     Tobacco Use     Smoking status: Former Smoker     Types: Cigarettes     Quit date: 2015     Years since quittin.5     Smokeless tobacco: Never Used     Tobacco comment: 1 pack a week   Substance Use Topics     Alcohol use: Not Currently     Comment: occasional wine (twice per year)     If you drink alcohol do you typically have >3 drinks per day or >7 drinks per week? Not applicable     No flowsheet data found.    Reviewed orders with patient.  Reviewed health maintenance and updated orders accordingly - Yes       Mammogram Screening: Patient over age 50, mutual decision to screen reflected in health maintenance.    Pertinent mammograms are reviewed under the imaging tab.  History of abnormal Pap smear: NO - age 30- 65 PAP every 5 years recommended  PAP / HPV Latest Ref Rng & Units 2017   PAP - NIL NIL NIL   HPV 16 DNA  "NEG Negative - -   HPV 18 DNA NEG Negative - -   OTHER HR HPV NEG Negative - -     Reviewed and updated as needed this visit by clinical staff  Tobacco  Allergies  Meds   Med Hx  Surg Hx  Fam Hx  Soc Hx        Reviewed and updated as needed this visit by Provider                    Review of Systems   Constitutional: Negative for chills and fever.   HENT: Negative for congestion, ear pain, hearing loss and sore throat.    Eyes: Negative for pain and visual disturbance.   Respiratory: Negative for cough and shortness of breath.    Cardiovascular: Negative for chest pain, palpitations and peripheral edema.   Gastrointestinal: Negative for abdominal pain, constipation, diarrhea, heartburn, hematochezia and nausea.   Breasts:  Negative for tenderness, breast mass and discharge.   Genitourinary: Negative for dysuria, frequency, genital sores, hematuria, pelvic pain, urgency, vaginal bleeding and vaginal discharge.   Musculoskeletal: Negative for arthralgias, joint swelling and myalgias.   Skin: Negative for rash.   Neurological: Negative for dizziness, weakness, headaches and paresthesias.   Psychiatric/Behavioral: Negative for mood changes. The patient is not nervous/anxious.      Just saw rheumatology    michael was every2 then 3 weeks    Now going to monthly     Working well    Patient will  Be  Calling for colonoscopy    Working  From  Home    Walk for exercise    Kids in good health     OBJECTIVE:   /77 (BP Location: Right arm, Patient Position: Chair, Cuff Size: Adult Regular)   Pulse 69   Temp 97.8  F (36.6  C) (Oral)   Ht 1.607 m (5' 3.25\")   Wt 71.3 kg (157 lb 2 oz)   Breastfeeding No   BMI 27.61 kg/m    Physical Exam  GENERAL: healthy, alert and no distress  EYES: Eyes grossly normal to inspection, PERRL and conjunctivae and sclerae normal  HENT: ear canals and TM's normal, nose and mouth without ulcers or lesions  NECK: no adenopathy, no asymmetry, masses, or scars and thyroid normal to " "palpation  RESP: lungs clear to auscultation - no rales, rhonchi or wheezes  CV: regular rate and rhythm, normal S1 S2, no S3 or S4, no murmur, click or rub, no peripheral edema and peripheral pulses strong  ABDOMEN: soft, nontender, no hepatosplenomegaly, no masses and bowel sounds normal  MS: no gross musculoskeletal defects noted, no edema  SKIN: no suspicious lesions or rashes  NEURO: Normal strength and tone, mentation intact and speech normal  PSYCH: mentation appears normal, affect normal/bright    Diagnostic Test Results:  Labs reviewed in Epic    ASSESSMENT/PLAN:   Carolina was seen today for physical and health maintenance.    Diagnoses and all orders for this visit:    Routine general medical examination at a health care facility    Screen for colon cancer  -     GASTROENTEROLOGY ADULT REF PROCEDURE ONLY; Future    Hyperlipidemia LDL goal <100  -     Lipid panel reflex to direct LDL Fasting    Hypothyroidism, unspecified type  -     TSH  -     T4 free    Screening for diabetes mellitus  -     Hemoglobin A1c    discussed several things  With patient   Up to date on mammagram  Patient to call [facility] to schedule  Check labs fasting ; moderate hi chol in past  Some family history diab grandparent  No std concern  Adjust thyroid  Med if needed based on lab results  RA per rheumatology     Patient has been advised of split billing requirements and indicates understanding: Yes  COUNSELING:  Reviewed preventive health counseling, as reflected in patient instructions       Regular exercise       Healthy diet/nutrition       Vision screening       Safe sex practices/STD prevention    Estimated body mass index is 27.61 kg/m  as calculated from the following:    Height as of this encounter: 1.607 m (5' 3.25\").    Weight as of this encounter: 71.3 kg (157 lb 2 oz).    Weight management plan: Discussed healthy diet and exercise guidelines    She reports that she quit smoking about 5 years ago. Her smoking use included " cigarettes. She has never used smokeless tobacco.      Counseling Resources:  ATP IV Guidelines  Pooled Cohorts Equation Calculator  Breast Cancer Risk Calculator  BRCA-Related Cancer Risk Assessment: FHS-7 Tool  FRAX Risk Assessment  ICSI Preventive Guidelines  Dietary Guidelines for Americans, 2010  USDA's MyPlate  ASA Prophylaxis  Lung CA Screening    Macario Soler MD  [facility]

## 2020-11-24 DIAGNOSIS — E03.4 HYPOTHYROIDISM DUE TO ACQUIRED ATROPHY OF THYROID: ICD-10-CM

## 2020-11-24 RX ORDER — LEVOTHYROXINE SODIUM 112 UG/1
112 TABLET ORAL DAILY
Qty: 90 TABLET | Refills: 1 | Status: SHIPPED | OUTPATIENT
Start: 2020-11-24 | End: 2021-06-08

## 2020-11-24 NOTE — RESULT ENCOUNTER NOTE
Hi Ms Mora  The TSH is a bit off but the free T4 is normal, so stay on same dose of thyroid medication.  I sent in refills for you. Advise rechecking this in about 6 months.    Take care  Macario Soler MD

## 2020-11-24 NOTE — RESULT ENCOUNTER NOTE
Carolina,     Your labs are stable. If Dr. Soler has any further recommendations for you he will message when he is back in clinic next week.   Shahrzad Mckinley PA-C

## 2020-12-31 ENCOUNTER — TRANSFERRED RECORDS (OUTPATIENT)
Dept: HEALTH INFORMATION MANAGEMENT | Facility: CLINIC | Age: 60
End: 2020-12-31

## 2021-02-11 DIAGNOSIS — Z79.899 ENCOUNTER FOR LONG-TERM (CURRENT) USE OF HIGH-RISK MEDICATION: ICD-10-CM

## 2021-02-11 DIAGNOSIS — M05.79 RHEUMATOID ARTHRITIS INVOLVING MULTIPLE SITES WITH POSITIVE RHEUMATOID FACTOR (H): ICD-10-CM

## 2021-02-11 DIAGNOSIS — M25.522 ELBOW PAIN, LEFT: ICD-10-CM

## 2021-02-17 ENCOUNTER — TELEPHONE (OUTPATIENT)
Dept: RHEUMATOLOGY | Facility: CLINIC | Age: 61
End: 2021-02-17

## 2021-02-17 NOTE — TELEPHONE ENCOUNTER
PA Initiation    Medication: HUMIRA NEW [facility]  Insurance Company: [employer] - Phone [phone] Fax [phone]  [facility] [address]  Filling Pharmacy Phone:    Filling Pharmacy Fax:    Start Date: 2/17/2021    LISA BOLAND (Caicedo: [id number])

## 2021-02-17 NOTE — TELEPHONE ENCOUNTER
Please start a PA for the Humira with her new insurance, [employer].  I did update her insurance in Epic as well.  Thanks

## 2021-02-18 DIAGNOSIS — M05.79 RHEUMATOID ARTHRITIS INVOLVING MULTIPLE SITES WITH POSITIVE RHEUMATOID FACTOR (H): ICD-10-CM

## 2021-02-18 NOTE — TELEPHONE ENCOUNTER
Prior Authorization Approval    Authorization Effective Date: 2/18/2021  Authorization Expiration Date: 2/17/2022  Medication: HUMIRA NEW INS - approved   Approved Dose/Quantity:  1 FOR 21 DAYS   Reference #:     Insurance Company: [employer] - Phone [phone] Fax [phone]  Expected CoPay: $5     CoPay Card Available: Yes    Foundation Assistance Needed:    Which Pharmacy is filling the prescription (Not needed for infusion/clinic administered): [facility] [address]  Pharmacy Notified: Yes  Patient Notified: Yes

## 2021-02-21 RX ORDER — ADALIMUMAB 40MG/0.4ML
KIT SUBCUTANEOUS
Qty: 2 EACH | Refills: 4 | Status: SHIPPED | OUTPATIENT
Start: 2021-02-21 | End: 2021-10-14

## 2021-02-21 NOTE — TELEPHONE ENCOUNTER
adalimumab (HUMIRA *CF* PEN) 40 MG/0.4ML pen kit  Last Written Prescription Date:  10/16/20  Last Fill Quantity: 2ea,   # refills: 4  Last Office Visit : 10/16/20  Future Office visit:  4/14/21

## 2021-03-02 DIAGNOSIS — M05.79 RHEUMATOID ARTHRITIS INVOLVING MULTIPLE SITES WITH POSITIVE RHEUMATOID FACTOR (H): Primary | ICD-10-CM

## 2021-03-02 RX ORDER — FOLIC ACID 1 MG/1
1 TABLET ORAL DAILY
Qty: 90 TABLET | Refills: 3 | Status: SHIPPED | OUTPATIENT
Start: 2021-03-02 | End: 2021-10-14

## 2021-03-02 NOTE — TELEPHONE ENCOUNTER
[facility] called and patient would like methotrexate and folic acid sent to [facility] home [facility], #90 days on each with 3 refills * ORDERS PENDED*

## 2021-03-31 ENCOUNTER — IMMUNIZATION (OUTPATIENT)
Dept: NURSING | Facility: CLINIC | Age: 61
End: 2021-03-31
Payer: COMMERCIAL

## 2021-03-31 PROCEDURE — 91300 PR COVID VAC PFIZER DIL RECON 30 MCG/0.3 ML IM: CPT

## 2021-03-31 PROCEDURE — 0001A PR COVID VAC PFIZER DIL RECON 30 MCG/0.3 ML IM: CPT

## 2021-04-05 DIAGNOSIS — Z79.631 LONG TERM METHOTREXATE USER: ICD-10-CM

## 2021-04-05 DIAGNOSIS — M05.79 RHEUMATOID ARTHRITIS INVOLVING MULTIPLE SITES WITH POSITIVE RHEUMATOID FACTOR (H): ICD-10-CM

## 2021-04-05 DIAGNOSIS — Z79.899 ENCOUNTER FOR LONG-TERM (CURRENT) USE OF HIGH-RISK MEDICATION: ICD-10-CM

## 2021-04-05 LAB
ERYTHROCYTE [DISTWIDTH] IN BLOOD BY AUTOMATED COUNT: 13.4 % (ref 10–15)
HCT VFR BLD AUTO: 41.9 % (ref 35–47)
HGB BLD-MCNC: 14.3 G/DL (ref 11.7–15.7)
MCH RBC QN AUTO: 31.4 PG (ref 26.5–33)
MCHC RBC AUTO-ENTMCNC: 34.1 G/DL (ref 31.5–36.5)
MCV RBC AUTO: 92 FL (ref 78–100)
PLATELET # BLD AUTO: 268 10E9/L (ref 150–450)
RBC # BLD AUTO: 4.56 10E12/L (ref 3.8–5.2)
WBC # BLD AUTO: 7.7 10E9/L (ref 4–11)

## 2021-04-05 PROCEDURE — 82040 ASSAY OF SERUM ALBUMIN: CPT | Performed by: NURSE PRACTITIONER

## 2021-04-05 PROCEDURE — 36415 COLL VENOUS BLD VENIPUNCTURE: CPT | Performed by: NURSE PRACTITIONER

## 2021-04-05 PROCEDURE — 82565 ASSAY OF CREATININE: CPT | Performed by: NURSE PRACTITIONER

## 2021-04-05 PROCEDURE — 84450 TRANSFERASE (AST) (SGOT): CPT | Performed by: NURSE PRACTITIONER

## 2021-04-05 PROCEDURE — 85027 COMPLETE CBC AUTOMATED: CPT | Performed by: NURSE PRACTITIONER

## 2021-04-05 PROCEDURE — 84460 ALANINE AMINO (ALT) (SGPT): CPT | Performed by: NURSE PRACTITIONER

## 2021-04-06 LAB
ALBUMIN SERPL-MCNC: 3.7 G/DL (ref 3.4–5)
ALT SERPL W P-5'-P-CCNC: 22 U/L (ref 0–50)
AST SERPL W P-5'-P-CCNC: 16 U/L (ref 0–45)
CREAT SERPL-MCNC: 0.84 MG/DL (ref 0.52–1.04)
GFR SERPL CREATININE-BSD FRML MDRD: 77 ML/MIN/{1.73_M2}

## 2021-04-14 ENCOUNTER — VIRTUAL VISIT (OUTPATIENT)
Dept: RHEUMATOLOGY | Facility: CLINIC | Age: 61
End: 2021-04-14
Attending: NURSE PRACTITIONER
Payer: COMMERCIAL

## 2021-04-14 VITALS — HEIGHT: 63 IN | WEIGHT: 152 LBS | BODY MASS INDEX: 26.93 KG/M2

## 2021-04-14 DIAGNOSIS — M05.79 RHEUMATOID ARTHRITIS, SEROPOSITIVE, MULTIPLE SITES (H): Primary | ICD-10-CM

## 2021-04-14 DIAGNOSIS — G56.01 RIGHT CARPAL TUNNEL SYNDROME: ICD-10-CM

## 2021-04-14 DIAGNOSIS — D84.9 IMMUNOCOMPROMISED (H): ICD-10-CM

## 2021-04-14 DIAGNOSIS — Z79.631 LONG TERM METHOTREXATE USER: ICD-10-CM

## 2021-04-14 PROCEDURE — 99214 OFFICE O/P EST MOD 30 MIN: CPT | Mod: 95 | Performed by: NURSE PRACTITIONER

## 2021-04-14 ASSESSMENT — PAIN SCALES - GENERAL: PAINLEVEL: NO PAIN (0)

## 2021-04-14 ASSESSMENT — MIFFLIN-ST. JEOR: SCORE: 1238.6

## 2021-04-14 NOTE — PROGRESS NOTES
"Chief Complaint   Patient presents with     Follow Up     6 months     Height 1.6 m (5' 3\"), weight 68.9 kg (152 lb), not currently breastfeeding.    Carolina is a 60 year old who is being evaluated via a billable video visit.      How would you like to obtain your AVS? IP [employer]  If the video visit is dropped, the invitation should be resent by: Other e-mail: USE [employer]- PATIENT IN THE WAITING ROOM  Will anyone else be joining your video visit? No      Video Start Time: 858 AM  Video-Visit Details    Type of service:  Video Visit    Video End Time:9:22 AM    Originating Location (pt. Location): Home in [place]     Distant Location (provider location):  [facility] RHEUMATOLOGY CLINIC [place]     Platform used for Video Visit: Jovanny      "

## 2021-04-14 NOTE — PROGRESS NOTES
Carolina Mora  is a 60 year old here for follow-up rheumatoid arthritis (RA) +RF 49 CCP >205 to co-manage with Rheum MD Dr. Fields. Poor prognosis      Review- +RF 49 CCP >205 -hep b/c 2015 12/2015 -MTB QG; XR hand 8-2016   Past-Pt initially referred to rheumatology 7/2015 for 6m history of polyarticular inflammatory arthritis and hand paresthesias. Found to have +RF/CCP and started on MTX and prednisone with significant improvement. Started on Humira 1/2016 given persistent inflammatory arthritis. Started to titrate down on MTX from 8 to 6 tabs spring 2017 with no flare. Tapered to 7.5 mg/wk in mid-2018. Humira frequency reduced in 2019 every 21 day 6-2019,  adalimumab (humira) every 4 week.     Initial visit October 14, 2019  Joe Medina APRN:   Rheumatoid arthritis (RA) controlled, no pain or swelling and the right knee no longer swollen this resolved 2-2019. No EAS. Left elbow contracture the same. No flares. No neck pain. No longer has nodules. No sores in the mouth or nose,. GI side-effects or hairloss associated with methotrexate use. Active and walks 30 to 60 minutes daily. She denies alcohol use. No bowel issues now for a long time.      Continue subcutaneous adalimumab (humira) citrate free 40mg every 2 weeks and methotrexate 7.5 mg weekly SAT, folic acid 2 mg day (had GI side-effects at higher doses). No prednisone or NSAID use. Use to take ibuprofen 800 mg QID at dx    April 14, 2021  Ruptured ACL, was deteriotarated in Spril 2021. Right knee leg are doing well. Walks a lot, and feels if good, did Physical Therapy. Will not be doing surgery.    Rheumatoid arthritis (RA) controlled, no joint pain or swelling. FROM. No EAS. Doing great. Function is good. Stiff in joints about 2 1/2 week into adalimumab (humira). No swelling loss ROM just few minutes in AM. Denies any fever, chills, SOB, THOMAS, night sweats, or chest pain, high fever, cough, travel in last 14 days or exposure to covid-19  (coronavirus). Reports healthy. Doing Covid-19 (coronavirus) guidelines. Right inner wrist and right elbow ache due to CTS, not using split key board. Working with boss to get this back. No hand weakness    On subcutaneous adalimumab (humira) citrate free 40mg every 3 weeks and methotrexate 7.5 mg weekly SAT, folic acid 2 mg day (had GI side-effects at higher doses). No prednisone or NSAID use.        PMH:  Injury-left 4th finger fracture now contracture PIP   Past Medical History:  Dysplasia of cervix (had cryotx) 1990's  Hypothyroidism (07/31/2014) due to atrophy  Menopause  Nephrolithiasis (s/p ESWL) (01/11)  Rheumatoid arthritis   Right CTS  Ulcerative colitis originally dx after 3rd child 13 yr ago, treated follow-up  Colonoscopies not find now even had she had that not even seeing scar tissue, next check 5 yr 2023      High cholesterol -diet   Smoker    Ventral hernia   DEXA 2017 T-0.7 normal      Past Surgical History:  Colonoscopy (03/14/14)  Cryothearpy, cervical (early 1990's)  HC removal Gallbladder (and repair ventral hernia)(2/01)     Family History:   No psoriasis, UC, crohn's, SLE, RA, PsA, gout, autoimmune thyroid.  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 Right handed.      PMSH personally reviewed and updated by me.     ROS:  +see above    Otherwise 14 point ROS obtained, reviewed and found negative.     Assessment via video:    Constitutional: WD-WN-WG cooperative  Eyes: nl EOM, PASCUAL,  vision, conjunctiva, sclera  ENT: nl external ears, nose, hearing, lips, teeth, gums, throat  Neck: no mass or thyroid enlargement  RESP: No cough, no audible wheezing, able to talk in full sentences  Neuro: nl cranial nerves, strength, sensation  Psych: nl judgement, orientation, memory, affect.  PSYCH: Alert and oriented times 3; coherent speech, normal   rate and volume, able to articulate logical thoughts, able   to abstract reason, no tangential thoughts, no hallucinations   or delusions  His affect is normal  Skin: no nail pitting, alopecia, rash  MS:   mild joint laxity of BL wrists   - L hand 4th PIP slight contraction flexure 2/2 prior fracture.   - Laxity of PIP joints without synovitis.   - Left elbow shows 10 degrees of extension but full flexion, right elbow is normal.    - L elbow w/ 5 deg contraction flexure w/o active synovitis (unchanged)  - Bony enlargement at first MTP at both sides  -right wrist subluxation   -+Right CTS sign   All other TMJ, neck, shoulder, elbow, wrist, hand, spine, hip, knee, ankle, and foot joints found normal. No deformity or nodule.  Remainder of exam unable to be completed due to video visits       Labs/Imaging:  Component      Latest Ref Rng & Units 4/5/2021   WBC      4.0 - 11.0 10e9/L 7.7   RBC Count      3.8 - 5.2 10e12/L 4.56   Hemoglobin      11.7 - 15.7 g/dL 14.3   Hematocrit      35.0 - 47.0 % 41.9   MCV      78 - 100 fl 92   MCH      26.5 - 33.0 pg 31.4   MCHC      31.5 - 36.5 g/dL 34.1   RDW      10.0 - 15.0 % 13.4   Platelet Count      150 - 450 10e9/L 268   Creatinine      0.52 - 1.04 mg/dL 0.84   GFR Estimate      >60 mL/min/1.73:m2 77   GFR Estimate If Black      >60 mL/min/1.73:m2 89   Albumin      3.4 - 5.0 g/dL 3.7   ALT      0 - 50 U/L 22   AST      0 - 45 U/L 16      Impression/Plan:  1. Seropositive RA (RF 49/ACPA 205), dx 7/2015. Hx rheumatoid nodules):  Rheumatoid arthritis (RA) remission. No flare, no longer has nodules and left elbow stable  contracture, right wrist subluxastion. I recommend continuing methotrexate 7.5mg every 7 days, adalimumab (humira) every 21 days. I asked the patient to monitor for dosing cycle symptoms of stiffness and pain and to report any symptoms of stiffness to our office. Discussed risk of rheum lung, no symptoms. Continue plan      2. High risk medications. Labs every 12 weeks. Up-to-date with the pneumonia vaccines and flu vaccine. discussed reviewed Labs are unremarkable in April 2021; CBC, LFT were all negative or normal.     3. Right wrist CTS, due to subluxation and office work use. Declined hand therapy,. Agree with split keyboard      Plan:  - Continue methotrexate 7.5 mg per week. While on methotrexate:  --q 3 mo AST/ALT, Albumin, CBC with plts  --Limit EtOH intake to 2 drinks weekly; use folate 1 mg daily (reduce by 1 mg).  --Tylenol 500 mg tid prn nausea/HA associated with dosing.  - adalimumab (HUMIRA PEN) citrate free 40 MG/0.8ML pen kit every 21 days if wears down go to every other week.  - Continue laboratory monitoring every 3-4 months; Cr, ALT, AST, CBC  - If any neck pain or surgeries, would want to do cervical x-rays to look for AA changes or instability      Follow-up: Follow up with 6 mth Dr. Augustine Fields with x-rays hands. wrists  Hold methotrexate week after Covid-19 (coronavirus) vaccine      The patient understood the rationale for the diagnosis and treatment plan. Patient shared in the decision making. All questions were answered to best of my ability and the patient's satisfaction and agrees with the plan.      Joe Medina APRN, CNP, MSN  [facility]  Department of Rheumatology & Autoimmune Disorders    Education:   1. Immunization: Reviewed CDC guidelines with patient updated, information provided to patient based on CDC guidelines for vaccination recommendations, patient will discuss with primary provider  2. Bone Health:Educated on adequate calcium and vitamin D intake,  Educated on exercise, Glucocorticoid education discussed risks, benefits & potential long term side effects from use per primary provider  3. Discussed routine annual physicals, cancer screening, cardiac risk monitoring, bone health and primary care with primary care provider. Also, educated glucocorticoid increase change of infections including shingles.   4. Educated on diagnosis, prognosis, labs, imaging, treatment options (including risks, benefits, risk of no treatment), medications (use, dose, side-effects, risks of medications including infection/cancer/bone marrow suppression, lab monitoring), infections what to do, plan of cares, goals of treatment. Clinic cards given. Websites given for education and resources.   5. Educated in Rheumatology we do not prescribe narcotics or manage chronic pain, the patient will need to discuss with primary care or go to a pain clinic for management.   6. Discussed corovid-19 prevention high risk patient precautions, social distancing, hand washing, follow CDC guidelines/resources and what to do for exposure signs or symptoms and where to go. Goal to avoid or minimize prednisone exposure to reduce infection risks,     Time 22 min video, 10 min chart reviewe, history and exam,documentation. =total 32 min

## 2021-04-14 NOTE — LETTER
"4/14/2021       RE: Carolina Mora  [address]  [facility] [address]     Dear Colleague,    Thank you for referring your patient, Carolina Mora, to the [facility] [place] at [facility]. Please see a copy of my visit note below.    Chief Complaint   Patient presents with     Follow Up     6 months     Height 1.6 m (5' 3\"), weight 68.9 kg (152 lb), not currently breastfeeding.    Carolina is a 60 year old who is being evaluated via a billable video visit.      How would you like to obtain your AVS? [employer]  If the video visit is dropped, the invitation should be resent by: Other e-mail: USE [employer]- PATIENT IN THE WAITING ROOM  Will anyone else be joining your video visit? No      Video Start Time: 858 AM  Video-Visit Details    Type of service:  Video Visit    Video End Time:9:22 AM    Originating Location (pt. Location): Home in [place]     Distant Location (provider location):  [facility] [place]     Platform used for Video Visit: Jovanny        Carolina Mora  is a 60 year old here for follow-up rheumatoid arthritis (RA) +RF 49 CCP >205 to co-manage with Rheum MD Dr. Fields. Poor prognosis      Review- +RF 49 CCP >205 -hep b/c 2015 12/2015 -MTB QG; XR hand 8-2016   Past-Pt initially referred to rheumatology 7/2015 for 6m history of polyarticular inflammatory arthritis and hand paresthesias. Found to have +RF/CCP and started on MTX and prednisone with significant improvement. Started on Humira 1/2016 given persistent inflammatory arthritis. Started to titrate down on MTX from 8 to 6 tabs spring 2017 with no flare. Tapered to 7.5 mg/wk in mid-2018. Humira frequency reduced in 2019 every 21 day 6-2019,  adalimumab (humira) every 4 week.     Initial visit October 14, 2019  Joe Medina APRN:   Rheumatoid arthritis (RA) controlled, no pain or swelling and the right knee no longer swollen this " resolved 2-2019. No EAS. Left elbow contracture the same. No flares. No neck pain. No longer has nodules. No sores in the mouth or nose,. GI side-effects or hairloss associated with methotrexate use. Active and walks 30 to 60 minutes daily. She denies alcohol use. No bowel issues now for a long time.      Continue subcutaneous adalimumab (humira) citrate free 40mg every 2 weeks and methotrexate 7.5 mg weekly SAT, folic acid 2 mg day (had GI side-effects at higher doses). No prednisone or NSAID use. Use to take ibuprofen 800 mg QID at dx    April 14, 2021  Ruptured ACL, was deteriotarated in Spril 2021. Right knee leg are doing well. Walks a lot, and feels if good, did Physical Therapy. Will not be doing surgery.    Rheumatoid arthritis (RA) controlled, no joint pain or swelling. FROM. No EAS. Doing great. Function is good. Stiff in joints about 2 1/2 week into adalimumab (humira). No swelling loss ROM just few minutes in AM. Denies any fever, chills, SOB, THOMAS, night sweats, or chest pain, high fever, cough, travel in last 14 days or exposure to covid-19 (coronavirus). Reports healthy. Doing Covid-19 (coronavirus) guidelines. Right inner wrist and right elbow ache due to CTS, not using split key board. Working with boss to get this back. No hand weakness    On subcutaneous adalimumab (humira) citrate free 40mg every 3 weeks and methotrexate 7.5 mg weekly SAT, folic acid 2 mg day (had GI side-effects at higher doses). No prednisone or NSAID use.        PMH:  Injury-left 4th finger fracture now contracture PIP   Past Medical History:  Dysplasia of cervix (had cryotx) 1990's  Hypothyroidism (07/31/2014) due to atrophy  Menopause  Nephrolithiasis (s/p ESWL) (01/11)  Rheumatoid arthritis   Right CTS  Ulcerative colitis originally dx after 3rd child 13 yr ago, treated follow-up  Colonoscopies not find now even had she had that not even seeing scar tissue, next check 5 yr 2023      High cholesterol -diet   Smoker    Ventral  hernia   DEXA 2017 T-0.7 normal      Past Surgical History:  Colonoscopy (03/14/14)  Cryothearpy, cervical (early 1990's)  HC removal Gallbladder (and repair ventral hernia)(2/01)     Family History:   No psoriasis, UC, crohn's, SLE, RA, PsA, gout, autoimmune thyroid.  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 Right handed.      PMSH personally reviewed and updated by me.     ROS:  +see above    Otherwise 14 point ROS obtained, reviewed and found negative.     Assessment via video:    Constitutional: WD-WN-WG cooperative  Eyes: nl EOM, PERRLA, vision, conjunctiva, sclera  ENT: nl external ears, nose, hearing, lips, teeth, gums, throat  Neck: no mass or thyroid enlargement  RESP: No cough, no audible wheezing, able to talk in full sentences  Neuro: nl cranial nerves, strength, sensation  Psych: nl judgement, orientation, memory, affect.  PSYCH: Alert and oriented times 3; coherent speech, normal   rate and volume, able to articulate logical thoughts, able   to abstract reason, no tangential thoughts, no hallucinations   or delusions  His affect is normal  Skin: no nail pitting, alopecia, rash  MS:   mild joint laxity of BL wrists   - L hand 4th PIP slight contraction flexure 2/2 prior fracture.   - Laxity of PIP joints without synovitis.   - Left elbow shows 10 degrees of extension but full flexion, right elbow is normal.    - L elbow w/ 5 deg contraction flexure w/o active synovitis (unchanged)  - Bony  enlargement at first MTP at both sides  -right wrist subluxation   -+Right CTS sign   All other TMJ, neck, shoulder, elbow, wrist, hand, spine, hip, knee, ankle, and foot joints found normal. No deformity or nodule.  Remainder of exam unable to be completed due to video visits       Labs/Imaging:  Component      Latest Ref Rng & Units 4/5/2021   WBC      4.0 - 11.0 10e9/L 7.7   RBC Count      3.8 - 5.2 10e12/L 4.56   Hemoglobin      11.7 - 15.7 g/dL 14.3   Hematocrit      35.0 - 47.0 % 41.9   MCV      78 - 100 fl 92   MCH      26.5 - 33.0 pg 31.4   MCHC      31.5 - 36.5 g/dL 34.1   RDW      10.0 - 15.0 % 13.4   Platelet Count      150 - 450 10e9/L 268   Creatinine      0.52 - 1.04 mg/dL 0.84   GFR Estimate      >60 mL/min/1.73:m2 77   GFR Estimate If Black      >60 mL/min/1.73:m2 89   Albumin      3.4 - 5.0 g/dL 3.7   ALT      0 - 50 U/L 22   AST      0 - 45 U/L 16      Impression/Plan:  1. Seropositive RA (RF 49/ACPA 205), dx 7/2015. Hx rheumatoid nodules):  Rheumatoid arthritis (RA) remission. No flare, no longer has nodules and left elbow stable contracture, right wrist subluxastion. I recommend continuing methotrexate 7.5mg every 7 days, adalimumab (humira) every 21 days. I asked the patient to monitor for dosing cycle symptoms of stiffness and pain and to report any symptoms of stiffness to our office. Discussed risk of rheum lung, no symptoms. Continue plan      2. High risk medications. Labs every 12 weeks. Up-to-date with the pneumonia vaccines and flu vaccine. discussed reviewed Labs are unremarkable in April 2021; CBC, LFT were all negative or normal.     3. Right wrist CTS, due to subluxation and office work use. Declined hand therapy,. Agree with split keyboard      Plan:  - Continue methotrexate 7.5 mg per week. While on methotrexate:  --q 3 mo AST/ALT, Albumin, CBC with plts  --Limit EtOH intake to 2 drinks weekly; use folate 1 mg daily (reduce by 1 mg).  --Tylenol 500 mg tid prn nausea/HA associated  with dosing.  - adalimumab (HUMIRA PEN) citrate free 40 MG/0.8ML pen kit every 21 days if wears down go to every other week.  - Continue laboratory monitoring every 3-4 months; Cr, ALT, AST, CBC  - If any neck pain or surgeries, would want to do cervical x-rays to look for AA changes or instability      Follow-up: Follow up with 6 mth Dr. Augustine Fields with x-rays hands. wrists  Hold methotrexate week after Covid-19 (coronavirus) vaccine      The patient understood the rationale for the diagnosis and treatment plan. Patient shared in the decision making. All questions were answered to best of my ability and the patient's satisfaction and agrees with the plan.      Joe ORTEGA, CNP, MSN  [facility]  Department of Rheumatology & Autoimmune Disorders    Education:   1. Immunization: Reviewed CDC guidelines with patient updated, information provided to patient based on CDC guidelines for vaccination recommendations, patient will discuss with primary provider  2. Bone Health:Educated on adequate calcium and vitamin D intake, Educated on exercise, Glucocorticoid education discussed risks, benefits & potential long term side effects from use per primary provider  3. Discussed routine annual physicals, cancer screening, cardiac risk monitoring, bone health and primary care with primary care provider. Also, educated glucocorticoid increase change of infections including shingles.   4. Educated on diagnosis, prognosis, labs, imaging, treatment options (including risks, benefits, risk of no treatment), medications (use, dose, side-effects, risks of medications including infection/cancer/bone marrow suppression, lab monitoring), infections what to do, plan of cares, goals of treatment. Clinic cards given. Websites given for education and resources.   5. Educated in Rheumatology we do not prescribe narcotics or manage chronic pain, the patient will need to discuss with primary care or go to a pain  clinic for management.   6. Discussed corovid-19 prevention high risk patient precautions, social distancing, hand washing, follow CDC guidelines/resources and what to do for exposure signs or symptoms and where to go. Goal to avoid or minimize prednisone exposure to reduce infection risks,     Time 22 min video, 10 min chart reviewe, history and exam,documentation. =total 32 min       Again, thank you for allowing me to participate in the care of your patient.      Sincerely,    JORDAN Edwards CNP

## 2021-04-21 ENCOUNTER — IMMUNIZATION (OUTPATIENT)
Dept: NURSING | Facility: CLINIC | Age: 61
End: 2021-04-21
Attending: INTERNAL MEDICINE
Payer: COMMERCIAL

## 2021-04-21 PROCEDURE — 0002A PR COVID VAC PFIZER DIL RECON 30 MCG/0.3 ML IM: CPT

## 2021-04-21 PROCEDURE — 91300 PR COVID VAC PFIZER DIL RECON 30 MCG/0.3 ML IM: CPT

## 2021-06-04 ENCOUNTER — MYC MEDICAL ADVICE (OUTPATIENT)
Dept: FAMILY MEDICINE | Facility: CLINIC | Age: 61
End: 2021-06-04

## 2021-06-04 DIAGNOSIS — E03.4 HYPOTHYROIDISM DUE TO ACQUIRED ATROPHY OF THYROID: ICD-10-CM

## 2021-06-07 NOTE — TELEPHONE ENCOUNTER
"Routing refill request to provider for review/approval because:  Labs out of range:  TSH      Requested Prescriptions   Pending Prescriptions Disp Refills     levothyroxine (SYNTHROID/LEVOTHROID) 112 MCG tablet 90 tablet 1     Sig: Take 1 tablet (112 mcg) by mouth daily       Thyroid Protocol Failed - 6/4/2021  9:04 AM        Failed - Normal TSH on file in past 12 months     Recent Labs   Lab Test 11/23/20  0750   TSH 0.28*              Passed - Patient is 12 years or older        Passed - Recent (12 mo) or future (30 days) visit within the authorizing provider's specialty     Patient has had an office visit with the authorizing provider or a provider within the authorizing providers department within the previous 12 mos or has a future within next 30 days. See \"Patient Info\" tab in inbasket, or \"Choose Columns\" in Meds & Orders section of the refill encounter.              Passed - Medication is active on med list        Passed - No active pregnancy on record     If patient is pregnant or has had a positive pregnancy test, please check TSH.          Passed - No positive pregnancy test in past 12 months     If patient is pregnant or has had a positive pregnancy test, please check TSH.               "

## 2021-06-08 RX ORDER — LEVOTHYROXINE SODIUM 112 UG/1
112 TABLET ORAL DAILY
Qty: 90 TABLET | Refills: 1 | Status: SHIPPED | OUTPATIENT
Start: 2021-06-08 | End: 2021-10-29

## 2021-06-17 DIAGNOSIS — E03.4 HYPOTHYROIDISM DUE TO ACQUIRED ATROPHY OF THYROID: ICD-10-CM

## 2021-06-17 LAB — TSH SERPL DL<=0.005 MIU/L-ACNC: 0.43 MU/L (ref 0.4–4)

## 2021-06-17 PROCEDURE — 84443 ASSAY THYROID STIM HORMONE: CPT | Performed by: FAMILY MEDICINE

## 2021-06-17 PROCEDURE — 36415 COLL VENOUS BLD VENIPUNCTURE: CPT | Performed by: FAMILY MEDICINE

## 2021-06-18 NOTE — RESULT ENCOUNTER NOTE
Carolina,  Your thyroid test is in the normal range, so please continue with your same levothyroxine dosing.    Michael Beltrán MD

## 2021-10-04 ENCOUNTER — TELEPHONE (OUTPATIENT)
Dept: RHEUMATOLOGY | Facility: CLINIC | Age: 61
End: 2021-10-04

## 2021-10-04 DIAGNOSIS — M05.79 RHEUMATOID ARTHRITIS, SEROPOSITIVE, MULTIPLE SITES (H): Primary | ICD-10-CM

## 2021-10-04 DIAGNOSIS — Z79.631 LONG TERM METHOTREXATE USER: ICD-10-CM

## 2021-10-04 DIAGNOSIS — M05.79 RHEUMATOID ARTHRITIS INVOLVING MULTIPLE SITES WITH POSITIVE RHEUMATOID FACTOR (H): ICD-10-CM

## 2021-10-04 NOTE — TELEPHONE ENCOUNTER
"Per Dr. Fields's last visit note 10/16/20:  \"Plan:  1.  Continue methotrexate 7.5 mg once weekly. While on methotrexate:   -- Check labs every 3 months (AST/ALT, Albumin, CBC with platelets)\"    Entered orders for labs listed.   Jude RN, BSN  10/04/21 2:49 PM   "

## 2021-10-04 NOTE — TELEPHONE ENCOUNTER
[facility] Call Center    Phone Message    May a detailed message be left on voicemail: yes     Reason for Call: Other: Pt is wanting to get labs done before her appointment on 10/14. There is no standing orders in her chart. Please update standing orders. Pt would like a [employer] message once the orders are updated.     Action Taken: Message routed to:  Other: [facility]     Travel Screening: Not Applicable

## 2021-10-13 ENCOUNTER — LAB (OUTPATIENT)
Dept: LAB | Facility: CLINIC | Age: 61
End: 2021-10-13
Payer: COMMERCIAL

## 2021-10-13 DIAGNOSIS — Z79.631 LONG TERM METHOTREXATE USER: ICD-10-CM

## 2021-10-13 DIAGNOSIS — M05.79 RHEUMATOID ARTHRITIS, SEROPOSITIVE, MULTIPLE SITES (H): ICD-10-CM

## 2021-10-13 DIAGNOSIS — M05.79 RHEUMATOID ARTHRITIS INVOLVING MULTIPLE SITES WITH POSITIVE RHEUMATOID FACTOR (H): ICD-10-CM

## 2021-10-13 LAB
ALBUMIN SERPL-MCNC: 3.6 G/DL (ref 3.4–5)
ALT SERPL W P-5'-P-CCNC: 21 U/L (ref 0–50)
AST SERPL W P-5'-P-CCNC: 14 U/L (ref 0–45)
ERYTHROCYTE [DISTWIDTH] IN BLOOD BY AUTOMATED COUNT: 13.5 % (ref 10–15)
HCT VFR BLD AUTO: 40 % (ref 35–47)
HGB BLD-MCNC: 13.8 G/DL (ref 11.7–15.7)
MCH RBC QN AUTO: 32 PG (ref 26.5–33)
MCHC RBC AUTO-ENTMCNC: 34.5 G/DL (ref 31.5–36.5)
MCV RBC AUTO: 93 FL (ref 78–100)
PLATELET # BLD AUTO: 230 10E3/UL (ref 150–450)
RBC # BLD AUTO: 4.31 10E6/UL (ref 3.8–5.2)
WBC # BLD AUTO: 6.9 10E3/UL (ref 4–11)

## 2021-10-13 PROCEDURE — 85027 COMPLETE CBC AUTOMATED: CPT

## 2021-10-13 PROCEDURE — 82040 ASSAY OF SERUM ALBUMIN: CPT

## 2021-10-13 PROCEDURE — 84450 TRANSFERASE (AST) (SGOT): CPT

## 2021-10-13 PROCEDURE — 82565 ASSAY OF CREATININE: CPT

## 2021-10-13 PROCEDURE — 36415 COLL VENOUS BLD VENIPUNCTURE: CPT

## 2021-10-13 PROCEDURE — 84460 ALANINE AMINO (ALT) (SGPT): CPT

## 2021-10-13 NOTE — PROGRESS NOTES
Rheumatology Clinic Visit  Augustine Fields M.D.     Carolinaayde Mora MRN# [id number]   YOB: 1960 Age: 61 year old     Date of Visit: 10/14/21    Primary care provider: Michael Beltrán     Assessment:    # Seropositive RA (RF 49/ACPA 205), dx 7/2015. Hx rheumatoid nodules):  Patient relates continued complete freedom from joint pain, stiffness and swelling. Exam shows DIP angulation/nodulosis, chronic left elbow contracture and no inflammatory changes.     Rheumatoid arthritis is well controlled. I recommend continuing methotrexate 7.5mg weekly. I recommend continuing frequency of Humira every 3 weeks, as patient describes mild dosing cycle dependent symptoms. I asked the patient to monitor for dosing cycle symptoms of stiffness and pain and to report any symptoms of stiffness to our office.    While on methotrexate:   -- Check labs every 3 months (AST/ALT, Albumin, CBC with platelets)   -- Limit alcohol intake to 2 drinks weekly; use folate 1 mg daily.  --Tylenol 500-1000 mg can be used as needed up to three times daily for nausea/headache associated with dosing.    # High risk medications. On October 13, 2021, CBC was normal; transaminases and creatinine were normal. Labs every 12-16 weeks. Up-to-date with the pneumonia vaccines and flu vaccine.    # Osteoarthritis, hands:    Plan:  1.  Continue methotrexate 3 tablets (7.5 mg) once weekly. While on methotrexate:   -- Check labs every 4 months (AST/ALT, Albumin, CBC with platelets)   -- Limit alcohol intake to 2 drinks weekly; use folate 1 mg daily.  --Tylenol 500-1000 mg can be used as needed up to three times daily for nausea/headache associated with dosing.  2.  Continue Humira 40 mg subcutaneously every 3 weeks.  3.  Receive COVID-19 vaccination booster shot at your convenience.    Follow-up: 6 months    Augustine Fields MD  Staff Rheumatologist, [facility]      HPI: Carolina Mora  is here for follow-up rheumatoid arthritis (RA) +RF 49 CCP >205.   "She was last seen in  by video evaluation.  Arthritis was judged in remission at that time, and patient was continued on low-dose methotrexate and Humira, with plans to reduce Humira frequency to every 4 weeks.     Review- +RF 49 CCP >205 -hep b/c 2015 12/2015 -MTB QG; XR hand 8-2016   Past: Pt initially referred to rheumatology 7/2015 for 6m history of polyarticular inflammatory arthritis and hand paresthesias. Found to have +RF/CCP and started on MTX and prednisone with significant improvement. Started on Humira 1/2016 given persistent inflammatory arthritis. Started to titrate down on MTX from 8 to 6 tabs spring 2017 with no flare. Tapered to 7.5 mg/wk in mid-2018. Humira frequency reduced in 2019 every 21 day 6-2019.    Interval history 10-:  In summer 2021, she developed pain in her insteps. Pain would develop suddenly in the middle of the night; shooting pain would last 4-5 minutes, then recede. Pain occurred most mornings for a month, L > r. She associates pain with wearing flip-flops--after stopping wearing them, the pain has largely disappeared.    Hands are doing well, no increase in chronic \"nodules\" in her fingertips (DIPs).    Early morning stiffness is negligible; she walks out any foot stiffness in 10-15 steps.    Mild stomach upset in the last 3 weeks. She started folate 2 mg daily, and stomach is now better.     Interval history 10-:    She is working from home; health is good. No change in how her joints are feeling despite tapering both humira and methortrexate. Early morning stiffness is 10-15 minutes in the feet and knees. No joint pain. No fevers; appetite is good. No symptoms that cycle with humira dosing. She is working outside in the yard, digging, weeding.     humira q 3 wks; methotrexate 3 tabs per week, stable.    She had a knee injury in 11-19; absence of ACL was diagnosed. she underwent physical therapy and bracing. Knee continues well controlled " now.         PMH:  Injury-left 4th finger fracture now contracture PIP   Past Medical History:  Dysplasia of cervix (had cryotx) 1990's  Hypothyroidism (07/31/2014) due to atrophy  Menopause  Nephrolithiasis (s/p ESWL) (01/11)  Rheumatoid arthritis   Ulcerative colitis originally dx after 3rd child 13 yr ago, treated follow-up  Colonoscopies not find now even had she had that not even seeing scar tissue, next check 5 yr 2023      High cholesterol -diet   Smoker    Ventral hernia   DEXA 2017 T-0.7 normal      Past Surgical History:  Colonoscopy (03/14/14)  Cryothearpy, cervical (early 1990's)  HC removal Gallbladder (and repair ventral hernia)(2/01)     Family History:   No psoriasis, UC, crohn's, SLE, RA, PsA, gout, autoimmune thyroid.  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 Right handed.      Current Outpatient Medications   Medication Sig     adalimumab (HUMIRA *CF* PEN) 40 MG/0.4ML pen kit INJECT 1 PEN (40 MG) SUBCUTANEOUSLY EVERY 21 DAYS (HOLD FOR SIGNS OF INFECTION, THEN SEEK MEDICAL ATTENTION. )     folic acid (FOLVITE) 1 MG tablet Take 1 tablet (1 mg) by mouth daily     levothyroxine (SYNTHROID/LEVOTHROID) 112 MCG tablet Take 1 tablet (112 mcg) by mouth daily     methotrexate 2.5 MG tablet Take 3 tablets (7.5 mg) by mouth every 7 days     No current facility-administered medications for this visit.        ROS:  Negative raynaud s phenomena, hairloss, sun sensitivities, keratoconjunctivitis sicca,  pleurisy, inflammatory joint symptoms, significant rashes like malar, oral/nasal or ulcerations, inflammatory eye disease, inflammatory bowel disease, dactylitis, tenosynovitis, or enthespathy. Negative for miscarriages over 2 months into pregnancy, blood clots, gout, psoriasis, UC, crohn's. No temporal headache, no jaw claudication, no scalp tenderness, vision changes, carotidynia, cough. No STD or pregnancy symptoms. No Parotid swelling. Denies international travel. Denies history of pneumonia, hepatitis, tuberculosis, shingles, sepsis, tick bites.      CONSTITUTIONAL: No fevers, night sweats or unintentional weight change. No acute distress, swollen glands  EYES: No vision change, diplopia, pain in eyes or red eyes   EARS, NOSE, MOUTH, THROAT: No tinnitus or hearing change, no epistaxis or nasal discharge, no oral lesions, throat clear. Normal saliva pool.  No drymouth. No thyroid enlargement.   CARDIOVASCULAR: No chest pain, palpitations, or pain with walking, no orthopnea or PND   RESPIRATORY: No dyspnea, cough, shortness of breath or wheezing. No pleurisy.   GI: No nausea, vomiting, diarrhea or constipation, no abdominal pain, or blood in stools.   : No change in urine, no dysuria or hematuria   MUSCKL: No swollen, tender, red or painful joints. No nodules. No enthesitis, plantar fascitis or heel pain.   INTEGUMENTARY: No concerning lesions or moles   NEURO: No loss of strength or sensation, no numbness or tingling, no tremor, no dizziness, no headache. No falls   ENDO: No polyuria or polydipsia, no temperature intolerance   HEME/LYMPH:No concerning bumps, bleeding problems, or swollen lymph nodes.   ALLERGY: No environmental allergies   PSYCH:No depression or anxiety, no sleep problems.  Otherwise 14 point ROS obtained, reviewed and found negative.     Exam  not currently breastfeeding.  Wt Readings from Last 4 Encounters:   04/14/21 68.9 kg (152 lb)   11/23/20 71.3 kg (157 lb 2 oz)   02/26/20 64.9 kg (143  lb)   12/17/19 63.5 kg (140 lb)       Constitutional: well-developed, appearing stated age; cooperative  Eyes: nl EOM, PERRLA, vision, conjunctiva, sclera  ENT: nl external ears, nose, hearing, lips, teeth, gums, throat  No mucous membrane lesions, normal saliva pool  Neck: no mass or thyroid enlargement  Resp: lungs clear to auscultation, nl to palpation  CV: RRR, no murmurs, rubs or gallops, no edema  GI: no ABD mass or tenderness, no HSM  : not tested  Lymph: no cervical, supraclavicular, inguinal or epitrochlear nodes  MS:   mild joint laxity of BL wrists   - L hand 4th PIP slight contraction flexure 2/2 prior fracture.   - Laxity of PIP joints without synovitis.   - Left elbow shows 10 degrees of extension but full flexion, right elbow is normal.    - L elbow w/ 5 deg contraction flexure w/o active synovitis (unchanged)  - Bony enlargement at first MTP at both sides  Skin: no nail pitting, alopecia, rash, nodules or lesions  Neuro: nl cranial nerves, strength, sensation, DTRs.   Psych: nl judgement, orientation, memory, affect.       Labs/Imaging:  Component      Latest Ref Rng & Units 4/10/2020   WBC      4.0 - 11.0 10e9/L 7.4   RBC Count      3.8 - 5.2 10e12/L 3.93   Hemoglobin      11.7 - 15.7 g/dL 12.3   Hematocrit      35.0 - 47.0 % 37.2   MCV      78 - 100 fl 95   MCH      26.5 - 33.0 pg 31.3   MCHC      31.5 - 36.5 g/dL 33.1   RDW      10.0 - 15.0 % 13.2   Platelet Count      150 - 450 10e9/L 267   Creatinine      0.52 - 1.04 mg/dL 0.74   GFR Estimate      >60 mL/min/1.73:m2 89   GFR Estimate If Black      >60 mL/min/1.73:m2 >90   Albumin      3.4 - 5.0 g/dL 4.0   ALT      0 - 50 U/L 20   AST      0 - 45 U/L 13

## 2021-10-14 ENCOUNTER — OFFICE VISIT (OUTPATIENT)
Dept: RHEUMATOLOGY | Facility: CLINIC | Age: 61
End: 2021-10-14
Payer: COMMERCIAL

## 2021-10-14 VITALS
DIASTOLIC BLOOD PRESSURE: 86 MMHG | OXYGEN SATURATION: 99 % | HEART RATE: 68 BPM | WEIGHT: 160.7 LBS | BODY MASS INDEX: 28.47 KG/M2 | SYSTOLIC BLOOD PRESSURE: 158 MMHG | HEIGHT: 63 IN | TEMPERATURE: 98.3 F

## 2021-10-14 DIAGNOSIS — M05.79 RHEUMATOID ARTHRITIS INVOLVING MULTIPLE SITES WITH POSITIVE RHEUMATOID FACTOR (H): ICD-10-CM

## 2021-10-14 LAB
CREAT SERPL-MCNC: 0.82 MG/DL (ref 0.52–1.04)
GFR SERPL CREATININE-BSD FRML MDRD: 79 ML/MIN/1.73M2

## 2021-10-14 PROCEDURE — 99214 OFFICE O/P EST MOD 30 MIN: CPT | Performed by: INTERNAL MEDICINE

## 2021-10-14 RX ORDER — ADALIMUMAB 40MG/0.4ML
KIT SUBCUTANEOUS
Qty: 2 EACH | Refills: 6 | Status: SHIPPED | OUTPATIENT
Start: 2021-10-14 | End: 2022-04-14

## 2021-10-14 RX ORDER — FOLIC ACID 1 MG/1
1 TABLET ORAL DAILY
Qty: 90 TABLET | Refills: 3 | Status: SHIPPED | OUTPATIENT
Start: 2021-10-14 | End: 2022-04-14

## 2021-10-14 ASSESSMENT — PAIN SCALES - GENERAL: PAINLEVEL: MODERATE PAIN (4)

## 2021-10-14 ASSESSMENT — MIFFLIN-ST. JEOR: SCORE: 1277.02

## 2021-10-14 NOTE — NURSING NOTE
"Chief Complaint   Patient presents with     RECHECK     Rheumatoid arthritis, seropositive, multiple sites       Vitals:    10/14/21 1514   BP: (!) 158/86   BP Location: Left arm   Patient Position: Sitting   Cuff Size: Adult Regular   Pulse: 68   Temp: 98.3  F (36.8  C)   TempSrc: Oral   SpO2: 99%   Weight: 72.9 kg (160 lb 11.2 oz)   Height: 1.607 m (5' 3.25\")       Body mass index is 28.24 kg/m .     Amara Colby MA  "

## 2021-10-14 NOTE — PATIENT INSTRUCTIONS
Diagnosis:  1.  Rheumatoid arthritis: Symptoms and physical examination today suggest complete remission on current medication.  I recommend continuing combination methotrexate and Humira.  2.  Osteoarthritis: This process, separate from inflammatory arthritis, is causing slow evolution of nodules and angulation in the joints at the ends of the fingers.  Immunomodulatory medication should not be expected to change the course of this very common and slowly progressing condition.    Plan:  1.  Continue methotrexate 3 tablets (7.5 mg) once weekly. While on methotrexate:   -- Check labs every 4 months (AST/ALT, Albumin, CBC with platelets)   -- Limit alcohol intake to 2 drinks weekly; use folate 1 mg daily.  --Tylenol 500-1000 mg can be used as needed up to three times daily for nausea/headache associated with dosing.  2.  Continue Humira 40 mg subcutaneously every 3 weeks.  3.  Receive COVID-19 vaccination booster shot at your convenience.

## 2021-10-15 ENCOUNTER — ANCILLARY PROCEDURE (OUTPATIENT)
Dept: MAMMOGRAPHY | Facility: CLINIC | Age: 61
End: 2021-10-15
Payer: COMMERCIAL

## 2021-10-15 DIAGNOSIS — Z12.31 VISIT FOR SCREENING MAMMOGRAM: ICD-10-CM

## 2021-10-15 PROCEDURE — 77063 BREAST TOMOSYNTHESIS BI: CPT | Mod: TC | Performed by: RADIOLOGY

## 2021-10-15 PROCEDURE — 77067 SCR MAMMO BI INCL CAD: CPT | Mod: TC | Performed by: RADIOLOGY

## 2021-10-27 DIAGNOSIS — E03.4 HYPOTHYROIDISM DUE TO ACQUIRED ATROPHY OF THYROID: ICD-10-CM

## 2021-10-29 RX ORDER — LEVOTHYROXINE SODIUM 112 UG/1
TABLET ORAL
Qty: 90 TABLET | Refills: 0 | Status: SHIPPED | OUTPATIENT
Start: 2021-10-29 | End: 2021-11-29

## 2021-10-29 NOTE — TELEPHONE ENCOUNTER
[place] [facility] [place]    Sherie Murphy RN

## 2021-11-09 ENCOUNTER — IMMUNIZATION (OUTPATIENT)
Dept: NURSING | Facility: CLINIC | Age: 61
End: 2021-11-09
Payer: COMMERCIAL

## 2021-11-09 PROCEDURE — 0004A PR COVID VAC PFIZER DIL RECON 30 MCG/0.3 ML IM: CPT

## 2021-11-09 PROCEDURE — 91300 PR COVID VAC PFIZER DIL RECON 30 MCG/0.3 ML IM: CPT

## 2021-11-27 ASSESSMENT — ENCOUNTER SYMPTOMS
DYSURIA: 0
FREQUENCY: 0
CHILLS: 0
CONSTIPATION: 0
PALPITATIONS: 0
SORE THROAT: 0
HEMATURIA: 0
ARTHRALGIAS: 0
DIARRHEA: 0
JOINT SWELLING: 0
PARESTHESIAS: 0
HEMATOCHEZIA: 0
HEARTBURN: 0
FEVER: 0
DIZZINESS: 0
EYE PAIN: 0
BREAST MASS: 0
NERVOUS/ANXIOUS: 0
NAUSEA: 0
WEAKNESS: 0
COUGH: 0
MYALGIAS: 0
HEADACHES: 0
SHORTNESS OF BREATH: 0
ABDOMINAL PAIN: 0

## 2021-11-29 ENCOUNTER — OFFICE VISIT (OUTPATIENT)
Dept: FAMILY MEDICINE | Facility: CLINIC | Age: 61
End: 2021-11-29
Payer: COMMERCIAL

## 2021-11-29 VITALS
OXYGEN SATURATION: 97 % | WEIGHT: 165 LBS | DIASTOLIC BLOOD PRESSURE: 63 MMHG | BODY MASS INDEX: 29 KG/M2 | SYSTOLIC BLOOD PRESSURE: 133 MMHG | TEMPERATURE: 98.4 F | HEART RATE: 66 BPM

## 2021-11-29 DIAGNOSIS — E78.5 HYPERLIPIDEMIA WITH TARGET LDL LESS THAN 130: ICD-10-CM

## 2021-11-29 DIAGNOSIS — E03.4 HYPOTHYROIDISM DUE TO ACQUIRED ATROPHY OF THYROID: ICD-10-CM

## 2021-11-29 DIAGNOSIS — Z23 NEED FOR PROPHYLACTIC VACCINATION WITH TETANUS-DIPHTHERIA (TD): ICD-10-CM

## 2021-11-29 DIAGNOSIS — E66.3 OVERWEIGHT (BMI 25.0-29.9): ICD-10-CM

## 2021-11-29 DIAGNOSIS — K51.80 OTHER ULCERATIVE COLITIS WITHOUT COMPLICATION (H): ICD-10-CM

## 2021-11-29 DIAGNOSIS — Z00.00 ROUTINE GENERAL MEDICAL EXAMINATION AT A HEALTH CARE FACILITY: Primary | ICD-10-CM

## 2021-11-29 DIAGNOSIS — M05.79 RHEUMATOID ARTHRITIS INVOLVING MULTIPLE SITES WITH POSITIVE RHEUMATOID FACTOR (H): ICD-10-CM

## 2021-11-29 LAB
CHOLEST SERPL-MCNC: 200 MG/DL
FASTING STATUS PATIENT QL REPORTED: YES
FASTING STATUS PATIENT QL REPORTED: YES
GLUCOSE BLD-MCNC: 99 MG/DL (ref 70–99)
HDLC SERPL-MCNC: 47 MG/DL
LDLC SERPL CALC-MCNC: 116 MG/DL
NONHDLC SERPL-MCNC: 153 MG/DL
TRIGL SERPL-MCNC: 184 MG/DL
TSH SERPL DL<=0.005 MIU/L-ACNC: 0.48 MU/L (ref 0.4–4)

## 2021-11-29 PROCEDURE — 99396 PREV VISIT EST AGE 40-64: CPT | Mod: 25 | Performed by: FAMILY MEDICINE

## 2021-11-29 PROCEDURE — 90715 TDAP VACCINE 7 YRS/> IM: CPT | Performed by: FAMILY MEDICINE

## 2021-11-29 PROCEDURE — 90471 IMMUNIZATION ADMIN: CPT | Performed by: FAMILY MEDICINE

## 2021-11-29 PROCEDURE — 99212 OFFICE O/P EST SF 10 MIN: CPT | Mod: 25 | Performed by: FAMILY MEDICINE

## 2021-11-29 PROCEDURE — 84443 ASSAY THYROID STIM HORMONE: CPT | Performed by: FAMILY MEDICINE

## 2021-11-29 PROCEDURE — 36415 COLL VENOUS BLD VENIPUNCTURE: CPT | Performed by: FAMILY MEDICINE

## 2021-11-29 PROCEDURE — 80061 LIPID PANEL: CPT | Performed by: FAMILY MEDICINE

## 2021-11-29 PROCEDURE — 82947 ASSAY GLUCOSE BLOOD QUANT: CPT | Performed by: FAMILY MEDICINE

## 2021-11-29 RX ORDER — LEVOTHYROXINE SODIUM 112 UG/1
112 TABLET ORAL DAILY
Qty: 90 TABLET | Refills: 3 | Status: SHIPPED | OUTPATIENT
Start: 2021-11-29 | End: 2022-11-14

## 2021-11-29 ASSESSMENT — ENCOUNTER SYMPTOMS
COUGH: 0
HEADACHES: 0
PALPITATIONS: 0
FEVER: 0
MYALGIAS: 0
HEARTBURN: 0
CONSTIPATION: 0
HEMATOCHEZIA: 0
BREAST MASS: 0
NERVOUS/ANXIOUS: 0
NAUSEA: 0
DYSURIA: 0
EYE PAIN: 0
SHORTNESS OF BREATH: 0
PARESTHESIAS: 0
JOINT SWELLING: 0
FREQUENCY: 0
SORE THROAT: 0
CHILLS: 0
HEMATURIA: 0
ABDOMINAL PAIN: 0
DIARRHEA: 0
ARTHRALGIAS: 0
DIZZINESS: 0
WEAKNESS: 0

## 2021-11-29 NOTE — RESULT ENCOUNTER NOTE
Carolina,  Your thyroid test is in the normal range again, so please continue with your same levothyroxine dosing.Your blood sugar just snuck into the normal range, so that is good.Your lipid values show an improved total cholesterol and LDL (bad cholesterol), but higher triglycerides and lower HDL (good cholesterol).  Healthy eating and regular exercise to achieve weight loss, as you are planning to do, would likely help these numbers further.    Michael Beltrán MD

## 2021-11-29 NOTE — PROGRESS NOTES
SUBJECTIVE:   CC: Carolina Mora is an 61 year old woman who presents for a preventive health visit and follow-up on some baseline health conditions.       Patient has been advised of split billing requirements and indicates understanding: Yes  Healthy Habits:     Getting at least 3 servings of Calcium per day:  Yes    Bi-annual eye exam:  Yes    Dental care twice a year:  Yes    Sleep apnea or symptoms of sleep apnea:  None    Diet:  Regular (no restrictions)    Frequency of exercise:  6-7 days/week    Duration of exercise:  30-45 minutes    Taking medications regularly:  Yes    Medication side effects:  None    PHQ-2 Total Score: 0    Additional concerns today:  No    She sees rheumatology every 6 months for rheumatoid arthritis and gets routine labs done every 3 months for that.  She sees GI periodically for her history of ulcerative colitis.  Her last colonoscopy in  showed no colitis at that time.  She remains on levothyroxine for hypothyroidism.  She has gained some weight in recent months which she attributes to the loss of her dog and she has not been walking as much therefore.  She does have a history of hyperlipidemia and came in fasting today for lab work.    Today's PHQ-2 Score:   PHQ-2 (  [employer]) 2021   Q1: Little interest or pleasure in doing things 0   Q2: Feeling down, depressed or hopeless 0   PHQ-2 Score 0   PHQ-2 Total Score (12-17 Years)- Positive if 3 or more points; Administer PHQ-A if positive -   Q1: Little interest or pleasure in doing things Not at all   Q2: Feeling down, depressed or hopeless Not at all   PHQ-2 Score 0       Abuse: Current or Past (Physical, Sexual or Emotional) - No  Do you feel safe in your environment? Yes        Social History     Tobacco Use     Smoking status: Former Smoker     Types: Cigarettes     Quit date: 2015     Years since quittin.5     Smokeless tobacco: Never Used     Tobacco comment: 1 pack a week   Substance Use Topics      Alcohol use: Not Currently     Comment: occasional wine (twice per year)     If you drink alcohol do you typically have >3 drinks per day or >7 drinks per week? No    Alcohol Use 2021   Prescreen: >3 drinks/day or >7 drinks/week? Not Applicable   Prescreen: >3 drinks/day or >7 drinks/week? -       Reviewed orders with patient.  Reviewed health maintenance and updated orders accordingly - Yes  Patient Active Problem List   Diagnosis     Ulcerative colitis (H)     History of nephrolithiasis     Hyperlipidemia with target LDL less than 130     Ventral hernia without obstruction or gangrene     Rheumatoid arthritis involving multiple sites with positive rheumatoid factor (H)     Encounter for long-term (current) use of high-risk medication     Hypothyroidism due to acquired atrophy of thyroid     Past Surgical History:   Procedure Laterality Date     COLONOSCOPY  3-14-14     CRYOTHERAPY, CERVICAL  EARLY      HC REMOVAL GALLBLADDER      and repair ventral hernia       Social History     Tobacco Use     Smoking status: Former Smoker     Types: Cigarettes     Quit date: 2/15/2020     Years since quittin.7     Smokeless tobacco: Never Used     Tobacco comment: 1 pack a week   Substance Use Topics     Alcohol use: Not Currently     Comment: occasional wine (twice per year)     Family History   Problem Relation Age of Onset     Breast Cancer Mother      Thyroid Disease Mother      Diabetes Maternal Grandmother      Hypertension Maternal Grandmother      Cerebrovascular Disease Maternal Grandmother      Cancer Maternal Grandmother      Hypertension Paternal Grandmother      Breast Cancer Paternal Grandmother      Cancer Paternal Grandmother      Thyroid Disease Sister      Skin Cancer Sister      Asthma Son      Melanoma No family hx of          Current Outpatient Medications   Medication Sig Dispense Refill     adalimumab (HUMIRA *CF* PEN) 40 MG/0.4ML pen kit INJECT 1 PEN (40 MG) SUBCUTANEOUSLY EVERY 21  DAYS (HOLD FOR SIGNS OF INFECTION, THEN SEEK MEDICAL ATTENTION. ) 2 each 6     folic acid (FOLVITE) 1 MG tablet Take 1 tablet (1 mg) by mouth daily 90 tablet 3     levothyroxine (SYNTHROID/LEVOTHROID) 112 MCG tablet TAKE 1 TABLET BY MOUTH DAILY 90 tablet 0     methotrexate 2.5 MG tablet Take 3 tablets (7.5 mg) by mouth every 7 days 36 tablet 4     Allergies   Allergen Reactions     Codeine      Nausea and vomiting     Dye [Contrast Dye]        Breast Cancer Screening:    FHS-7:   Breast CA Risk Assessment (FHS-7) 10/15/2021 11/27/2021   Did any of your first-degree relatives have breast or ovarian cancer? Yes Yes   Did any of your relatives have bilateral breast cancer? No No   Did any man in your family have breast cancer? No No   Did any woman in your family have breast and ovarian cancer? No Yes   Did any woman in your family have breast cancer before age 50 y? No No   Do you have 2 or more relatives with breast and/or ovarian cancer? No Yes   Do you have 2 or more relatives with breast and/or bowel cancer? No Yes       Mammogram Screening: Recommended mammography every 1-2 years with patient discussion and risk factor consideration  Pertinent mammograms are reviewed under the imaging tab.    History of abnormal Pap smear: NO - age 30-65 PAP every 5 years with negative HPV co-testing recommended  PAP / HPV Latest Ref Rng & Units 6/30/2017 7/26/2013 7/24/2012   PAP (Historical) - NIL NIL NIL   HPV16 NEG Negative - -   HPV18 NEG Negative - -   HRHPV NEG Negative - -     Reviewed and updated as needed this visit by clinical staff                Reviewed and updated as needed this visit by Provider                   Review of Systems   Constitutional: Negative for chills and fever.   HENT: Negative for congestion, ear pain, hearing loss and sore throat.    Eyes: Negative for pain and visual disturbance.   Respiratory: Negative for cough and shortness of breath.    Cardiovascular: Negative for chest pain, palpitations  and peripheral edema.   Gastrointestinal: Negative for abdominal pain, constipation, diarrhea, heartburn, hematochezia and nausea.   Breasts:  Negative for tenderness, breast mass and discharge.   Genitourinary: Negative for dysuria, frequency, genital sores, hematuria, pelvic pain, urgency, vaginal bleeding and vaginal discharge.   Musculoskeletal: Negative for arthralgias, joint swelling and myalgias.   Skin: Negative for rash.   Neurological: Negative for dizziness, weakness, headaches and paresthesias.   Psychiatric/Behavioral: Negative for mood changes. The patient is not nervous/anxious.           OBJECTIVE:   /63 (BP Location: Right arm, Patient Position: Sitting, Cuff Size: Adult Regular)   Pulse 66   Temp 98.4  F (36.9  C) (Oral)   Wt 74.8 kg (165 lb)   SpO2 97%   Breastfeeding No   BMI 29.00 kg/m    Physical Exam  GENERAL: alert, no distress and over weight  EYES: Eyes grossly normal to inspection, PERRL and conjunctivae and sclerae normal  HENT: Grossly normal  NECK: no adenopathy, no asymmetry, masses, or scars and thyroid normal to palpation  RESP: lungs clear to auscultation - no rales, rhonchi or wheezes  CV: regular rate and rhythm, normal S1 S2, no S3 or S4, no murmur, click or rub, no peripheral edema and peripheral pulses strong  ABDOMEN: soft, nontender, no hepatosplenomegaly, no masses   MS: no gross musculoskeletal defects noted, no edema  SKIN: no suspicious lesions or rashes  NEURO: Normal strength and tone, mentation intact and speech normal  PSYCH: mentation appears normal, affect normal/bright    Diagnostic Test Results:  Labs reviewed in Epic    ASSESSMENT/PLAN:       ICD-10-CM    1. Routine general medical examination at a health care facility  Z00.00 Glucose     Lipid panel reflex to direct LDL Fasting   2. Hypothyroidism due to acquired atrophy of thyroid  E03.4 TSH with free T4 reflex   3. Hyperlipidemia with target LDL less than 130  E78.5    4. Rheumatoid arthritis  "involving multiple sites with positive rheumatoid factor (H)  M05.79    5. Other ulcerative colitis without complication (H)  K51.80    6. Overweight (BMI 25.0-29.9)  E66.3    7. Need for prophylactic vaccination with tetanus-diphtheria (Td)  Z23      ADMIN VACCINE, FIRST     Blood pressure and other vital signs look fine  We discussed the above items  We will check fasting lab work today as above  We will plan to continue her same medications for now  Continue ongoing care with GI and rheumatology as per their recommendations  We will give her a tetanus booster  Plan a tentative recheck in 1 year, or sooner prn    Patient has been advised of split billing requirements and indicates understanding: Yes  COUNSELING:  Reviewed preventive health counseling, as reflected in patient instructions       Regular exercise       Healthy diet/nutrition       Immunizations    Vaccinated for: TDAP          Estimated body mass index is 29 kg/m  as calculated from the following:    Height as of 10/14/21: 1.607 m (5' 3.25\").    Weight as of this encounter: 74.8 kg (165 lb).    Weight management plan: Discussed healthy diet and exercise guidelines    She reports that she quit smoking about 6 years ago. Her smoking use included cigarettes. She has never used smokeless tobacco.      Counseling Resources:  ATP IV Guidelines  Pooled Cohorts Equation Calculator  Breast Cancer Risk Calculator  BRCA-Related Cancer Risk Assessment: FHS-7 Tool  FRAX Risk Assessment  ICSI Preventive Guidelines  Dietary Guidelines for Americans, 2010  USDA's MyPlate  ASA Prophylaxis  Lung CA Screening    Michael Beltrán MD  [facility]  "

## 2021-12-14 NOTE — RESULT ENCOUNTER NOTE
Carolina,  Your cholesterol values are higher than in recent years, but the rest of your labs are nice and normal including the thyroid test, liver and kidney tests, diabetes testing, and blood counts.  There is no sign of any internal abdominal problem here.  Please continue your same thyroid medication.    Michael Beltrán MD   No

## 2022-04-05 ENCOUNTER — TELEPHONE (OUTPATIENT)
Dept: RHEUMATOLOGY | Facility: CLINIC | Age: 62
End: 2022-04-05
Payer: COMMERCIAL

## 2022-04-05 NOTE — TELEPHONE ENCOUNTER
PA Initiation    Medication: Humira--PA Initiated  Insurance Company: [employer] - Phone [phone] Fax [phone]  Pharmacy Filling the Rx:    Filling Pharmacy Phone:    Filling Pharmacy Fax:    Start Date: 4/5/2022  Faxed PA Form and Clinicals to [phone]

## 2022-04-07 NOTE — TELEPHONE ENCOUNTER
Prior Authorization Approval    Authorization Effective Date: 3/8/2022  Authorization Expiration Date: 4/7/2023  Medication: Humira--PA Approved  Approved Dose/Quantity: UD  Reference #:     Insurance Company: [employer] - Phone [phone] Fax [phone]  Expected CoPay:       CoPay Card Available:      Foundation Assistance Needed:    Which Pharmacy is filling the prescription (Not needed for infusion/clinic administered):    Pharmacy Notified: NO    Patient Notified:  NO    Case#[id number]

## 2022-04-12 ENCOUNTER — LAB (OUTPATIENT)
Dept: LAB | Facility: CLINIC | Age: 62
End: 2022-04-12
Payer: COMMERCIAL

## 2022-04-12 DIAGNOSIS — M05.79 RHEUMATOID ARTHRITIS INVOLVING MULTIPLE SITES WITH POSITIVE RHEUMATOID FACTOR (H): ICD-10-CM

## 2022-04-12 LAB
ALBUMIN SERPL-MCNC: 3.6 G/DL (ref 3.4–5)
ALT SERPL W P-5'-P-CCNC: 26 U/L (ref 0–50)
AST SERPL W P-5'-P-CCNC: 19 U/L (ref 0–45)
CREAT SERPL-MCNC: 0.8 MG/DL (ref 0.52–1.04)
ERYTHROCYTE [DISTWIDTH] IN BLOOD BY AUTOMATED COUNT: 13.8 % (ref 10–15)
GFR SERPL CREATININE-BSD FRML MDRD: 84 ML/MIN/1.73M2
HCT VFR BLD AUTO: 40.8 % (ref 35–47)
HGB BLD-MCNC: 13.7 G/DL (ref 11.7–15.7)
MCH RBC QN AUTO: 31.3 PG (ref 26.5–33)
MCHC RBC AUTO-ENTMCNC: 33.6 G/DL (ref 31.5–36.5)
MCV RBC AUTO: 93 FL (ref 78–100)
PLATELET # BLD AUTO: 262 10E3/UL (ref 150–450)
RBC # BLD AUTO: 4.38 10E6/UL (ref 3.8–5.2)
WBC # BLD AUTO: 5.9 10E3/UL (ref 4–11)

## 2022-04-12 PROCEDURE — 36415 COLL VENOUS BLD VENIPUNCTURE: CPT

## 2022-04-12 PROCEDURE — 85027 COMPLETE CBC AUTOMATED: CPT

## 2022-04-12 PROCEDURE — 82565 ASSAY OF CREATININE: CPT

## 2022-04-12 PROCEDURE — 82040 ASSAY OF SERUM ALBUMIN: CPT

## 2022-04-12 PROCEDURE — 84450 TRANSFERASE (AST) (SGOT): CPT

## 2022-04-12 PROCEDURE — 84460 ALANINE AMINO (ALT) (SGPT): CPT

## 2022-04-14 ENCOUNTER — OFFICE VISIT (OUTPATIENT)
Dept: RHEUMATOLOGY | Facility: CLINIC | Age: 62
End: 2022-04-14
Payer: COMMERCIAL

## 2022-04-14 VITALS
DIASTOLIC BLOOD PRESSURE: 85 MMHG | WEIGHT: 161.1 LBS | BODY MASS INDEX: 28.54 KG/M2 | SYSTOLIC BLOOD PRESSURE: 127 MMHG | HEART RATE: 65 BPM | OXYGEN SATURATION: 97 % | HEIGHT: 63 IN

## 2022-04-14 DIAGNOSIS — M05.79 RHEUMATOID ARTHRITIS INVOLVING MULTIPLE SITES WITH POSITIVE RHEUMATOID FACTOR (H): ICD-10-CM

## 2022-04-14 PROCEDURE — 99214 OFFICE O/P EST MOD 30 MIN: CPT | Performed by: INTERNAL MEDICINE

## 2022-04-14 RX ORDER — ADALIMUMAB 40MG/0.4ML
KIT SUBCUTANEOUS
Qty: 2 EACH | Refills: 6 | Status: SHIPPED | OUTPATIENT
Start: 2022-04-14 | End: 2022-10-06

## 2022-04-14 RX ORDER — FOLIC ACID 1 MG/1
1 TABLET ORAL DAILY
Qty: 90 TABLET | Refills: 3 | Status: SHIPPED | OUTPATIENT
Start: 2022-04-14 | End: 2022-10-06

## 2022-04-14 ASSESSMENT — PAIN SCALES - GENERAL: PAINLEVEL: NO PAIN (0)

## 2022-04-14 NOTE — PROGRESS NOTES
Rheumatology Clinic Visit  Augustine Fields M.D.     Carolina Mora MRN# [id number]   YOB: 1960 Age: 61 year old     Date of Visit: 04/14/2022    Primary care provider: Michael Beltrán     Assessment:    # Seropositive RA (RF 49/ACPA 205), dx 7/2015. Hx rheumatoid nodules):  Patient relates continued complete freedom from joint pain, stiffness and swelling. Exam shows DIP angulation/nodulosis, stable left elbow contracture and no inflammatory changes.    Rheumatoid arthritis is in remission. I recommend continuing methotrexate 7.5mg weekly.  I recommend continuing Humira, but reducing frequency to every 4 weeks.  I asked the patient to monitor for dosing cycle symptoms of stiffness and pain and to report any symptoms of stiffness to our office.  If no symptoms occur with reduced frequency Humira, I will recommend a trial of discontinuation at the next visit to test the hypothesis that long-lasting remission has been induced.    # High risk medications.  Laboratory evaluation on April 12, 2022 showed creatinine, albumin, transaminases, and CBC all normal or negative. Labs every 12-16 weeks. Up-to-date with the pneumonia vaccines and flu vaccine. S/po COVID vaccine X 4    # Osteoarthritis, hands:    Plan:  1.  Continue methotrexate 3 tablets (7.5 mg) once weekly. While on methotrexate:   -- Check labs every 4 months (AST/ALT, Albumin, CBC with platelets)   -- Limit alcohol intake to 2 drinks weekly; use folate 1 mg daily.  --Tylenol 500-1000 mg can be used as needed up to three times daily for nausea/headache associated with dosing.  2.  Continue Humira 40 mg subcutaneously, but reduce frequency to every 4 weeks.  3.  Receive COVID-19 vaccination booster shot (4th) as scheduled    Follow-up: 6 months    Augustine Fields MD  Staff Rheumatologist, [facility]      HPI: Carolina Mora  is here for follow-up rheumatoid arthritis (RA) +RF 49 CCP >205.  She was last seen in . Arthritis was judged in  "remission at that time, and patient was continued on low-dose methotrexate and Humira, with plans to continue humira every 3 weeks.     Review- +RF 49 CCP >205 -hep b/c 2015 12/2015 -MTB QG; XR hand 8-2016   Past: Pt initially referred to rheumatology 7/2015 for 6m history of polyarticular inflammatory arthritis and hand paresthesias. Found to have +RF/CCP and started on MTX and prednisone with significant improvement. Started on Humira 1/2016 given persistent inflammatory arthritis. Started to titrate down on MTX from 8 to 6 tabs spring 2017 with no flare. Tapered to 7.5 mg/wk in mid-2018. Humira frequency reduced in 2019 every 21 day 6-2019.    Interval history April 14, 2022    No joint pain, no stiffness, no humira dosing cycle sypmtoms. ADLs not disrupted.  No early morning stiffness.  No night pain.  Taking humira q 3 weeks and methotrexate 7.5 mg weekly.    Interval history 10-:  In summer 2021, she developed pain in her insteps. Pain would develop suddenly in the middle of the night; shooting pain would last 4-5 minutes, then recede. Pain occurred most mornings for a month, L > r. She associates pain with wearing flip-flops--after stopping wearing them, the pain has largely disappeared.    Hands are doing well, no increase in chronic \"nodules\" in her fingertips (DIPs).    Early morning stiffness is negligible; she walks out any foot stiffness in 10-15 steps.    Mild stomach upset in the last 3 weeks. She started folate 2 mg daily, and stomach is now better.       PMH:  Injury-left 4th finger fracture now contracture PIP   Past Medical History:  Dysplasia of cervix (had cryotx) 1990's  Hypothyroidism (07/31/2014) due to atrophy  Menopause  Nephrolithiasis (s/p ESWL) (01/11)  Rheumatoid arthritis   Ulcerative colitis originally dx after 3rd child 13 yr ago, treated follow-up  Colonoscopies not find now even had she had that not even seeing scar tissue, next check 5 yr 2023      High cholesterol -diet "   Smoker    Ventral hernia   DEXA 2017 T-0.7 normal      Past Surgical History:  Colonoscopy (03/14/14)  Cryothearpy, cervical (early 1990's)  HC removal Gallbladder (and repair ventral hernia)(2/01)     Family History:   No psoriasis, UC, crohn's, SLE, RA, PsA, gout, autoimmune thyroid.  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 Right handed.      Current Outpatient Medications   Medication Sig     levothyroxine (SYNTHROID/LEVOTHROID) 112 MCG tablet Take 1 tablet (112 mcg) by mouth daily     adalimumab (HUMIRA *CF* PEN) 40 MG/0.4ML pen kit INJECT 1 PEN (40 MG) SUBCUTANEOUSLY EVERY 21 DAYS (HOLD FOR SIGNS OF INFECTION, THEN SEEK MEDICAL ATTENTION. )     folic acid (FOLVITE) 1 MG tablet Take 1 tablet (1 mg) by mouth daily     methotrexate 2.5 MG tablet Take 3 tablets (7.5 mg) by mouth every 7 days     No current facility-administered medications for this visit.        ROS:  Negative raynaud s phenomena, hairloss, sun sensitivities, keratoconjunctivitis sicca, pleurisy, inflammatory joint symptoms, significant rashes like malar, oral/nasal or ulcerations, inflammatory eye disease, inflammatory bowel disease, dactylitis, tenosynovitis, or enthespathy. Negative for miscarriages over 2 months into pregnancy, blood clots, gout, psoriasis, UC, crohn's. No temporal headache, no jaw claudication, no scalp tenderness, vision changes, carotidynia, cough. No STD or pregnancy symptoms. No Parotid swelling. Denies  "international travel. Denies history of pneumonia, hepatitis, tuberculosis, shingles, sepsis, tick bites.      CONSTITUTIONAL: No fevers, night sweats or unintentional weight change. No acute distress, swollen glands  EYES: No vision change, diplopia, pain in eyes or red eyes   EARS, NOSE, MOUTH, THROAT: No tinnitus or hearing change, no epistaxis or nasal discharge, no oral lesions, throat clear. Normal saliva pool.  No drymouth. No thyroid enlargement.   CARDIOVASCULAR: No chest pain, palpitations, or pain with walking, no orthopnea or PND   RESPIRATORY: No dyspnea, cough, shortness of breath or wheezing. No pleurisy.   GI: No nausea, vomiting, diarrhea or constipation, no abdominal pain, or blood in stools.   : No change in urine, no dysuria or hematuria   MUSCKL: No swollen, tender, red or painful joints. No nodules. No enthesitis, plantar fascitis or heel pain.   INTEGUMENTARY: No concerning lesions or moles   NEURO: No loss of strength or sensation, no numbness or tingling, no tremor, no dizziness, no headache. No falls   ENDO: No polyuria or polydipsia, no temperature intolerance   HEME/LYMPH:No concerning bumps, bleeding problems, or swollen lymph nodes.   ALLERGY: No environmental allergies   PSYCH:No depression or anxiety, no sleep problems.  Otherwise 14 point ROS obtained, reviewed and found negative.     Exam  Blood pressure 127/85, pulse 65, height 1.607 m (5' 3.25\"), weight 73.1 kg (161 lb 1.6 oz), SpO2 97 %, not currently breastfeeding.  Wt Readings from Last 4 Encounters:   04/14/22 73.1 kg (161 lb 1.6 oz)   11/29/21 74.8 kg (165 lb)   10/14/21 72.9 kg (160 lb 11.2 oz)   04/14/21 68.9 kg (152 lb)       Constitutional: well-developed, appearing stated age; cooperative  Eyes: nl EOM, PERRLA, vision, conjunctiva, sclera  ENT: nl external ears, nose, hearing, lips, teeth, gums, throat  No mucous membrane lesions, normal saliva pool  Neck: no mass or thyroid enlargement  Resp: breathing " unlabored  Lymph: no cervical, supraclavicular, inguinal or epitrochlear nodes  MS:   mild joint laxity of BL wrists   - L hand 4th PIP slight contraction flexure 2/2 prior fracture.   - Laxity of PIP joints without synovitis.   - L elbow w/ 5 deg contraction flexure w/o active synovitis (unchanged)  - Bony enlargement at first MTP at both sides  Skin: no nail pitting, alopecia, rash, nodules or lesions  Neuro: nl cranial nerves, strength, sensation, DTRs.   Psych: nl judgement, orientation, memory, affect.       Labs/Imaging:  Component      Latest Ref Rng & Units 4/10/2020   WBC      4.0 - 11.0 10e9/L 7.4   RBC Count      3.8 - 5.2 10e12/L 3.93   Hemoglobin      11.7 - 15.7 g/dL 12.3   Hematocrit      35.0 - 47.0 % 37.2   MCV      78 - 100 fl 95   MCH      26.5 - 33.0 pg 31.3   MCHC      31.5 - 36.5 g/dL 33.1   RDW      10.0 - 15.0 % 13.2   Platelet Count      150 - 450 10e9/L 267   Creatinine      0.52 - 1.04 mg/dL 0.74   GFR Estimate      >60 mL/min/1.73:m2 89   GFR Estimate If Black      >60 mL/min/1.73:m2 >90   Albumin      3.4 - 5.0 g/dL 4.0   ALT      0 - 50 U/L 20   AST      0 - 45 U/L 13

## 2022-04-14 NOTE — NURSING NOTE
"Chief Complaint   Patient presents with     RECHECK     Rheumatoid arthritis involving multiple sites with positive rheumatoid factor (H)       Vitals:    04/14/22 1450   BP: 127/85   BP Location: Left arm   Patient Position: Sitting   Cuff Size: Adult Regular   Pulse: 65   SpO2: 97%   Weight: 73.1 kg (161 lb 1.6 oz)   Height: 1.607 m (5' 3.25\")       Body mass index is 28.31 kg/m .    Amara Colby MA    "

## 2022-04-14 NOTE — PATIENT INSTRUCTIONS
Diagnosis:  1.  Rheumatoid arthritis: Symptoms and physical examination today suggest complete remission on current medication.  I recommend continuing combination methotrexate and Humira.  2.  Osteoarthritis: This process, separate from inflammatory arthritis, is causing very slow evolution of nodules and angulation in the joints at the ends of the fingers.  Immunomodulatory medication should not be expected to change the course of this very common and slowly progressing condition.    Plan:  1.  Continue methotrexate 3 tablets (7.5 mg) once weekly. While on methotrexate:   -- Check labs every 4 months (AST/ALT, Albumin, CBC with platelets)   -- Limit alcohol intake to 2 drinks weekly; use folate 1 mg daily.  --Tylenol 500-1000 mg can be used as needed up to three times daily for nausea/headache associated with dosing.  2.  Continue Humira 40 mg subcutaneously, but reduce frequency to every 4 weeks.  3.  Receive COVID-19 vaccination booster shot (4th) as scheduled

## 2022-04-15 ENCOUNTER — IMMUNIZATION (OUTPATIENT)
Dept: NURSING | Facility: CLINIC | Age: 62
End: 2022-04-15
Payer: COMMERCIAL

## 2022-04-15 PROCEDURE — 0054A COVID-19,PF,PFIZER (12+ YRS): CPT

## 2022-04-15 PROCEDURE — 91305 COVID-19,PF,PFIZER (12+ YRS): CPT

## 2022-07-14 ENCOUNTER — LAB (OUTPATIENT)
Dept: LAB | Facility: CLINIC | Age: 62
End: 2022-07-14
Payer: COMMERCIAL

## 2022-07-14 DIAGNOSIS — M05.79 RHEUMATOID ARTHRITIS INVOLVING MULTIPLE SITES WITH POSITIVE RHEUMATOID FACTOR (H): ICD-10-CM

## 2022-07-14 LAB
ALBUMIN SERPL-MCNC: 3.8 G/DL (ref 3.4–5)
ALT SERPL W P-5'-P-CCNC: 27 U/L (ref 0–50)
AST SERPL W P-5'-P-CCNC: 16 U/L (ref 0–45)
ERYTHROCYTE [DISTWIDTH] IN BLOOD BY AUTOMATED COUNT: 13.7 % (ref 10–15)
HCT VFR BLD AUTO: 43 % (ref 35–47)
HGB BLD-MCNC: 14.4 G/DL (ref 11.7–15.7)
MCH RBC QN AUTO: 31.6 PG (ref 26.5–33)
MCHC RBC AUTO-ENTMCNC: 33.5 G/DL (ref 31.5–36.5)
MCV RBC AUTO: 95 FL (ref 78–100)
PLATELET # BLD AUTO: 251 10E3/UL (ref 150–450)
RBC # BLD AUTO: 4.55 10E6/UL (ref 3.8–5.2)
WBC # BLD AUTO: 5.8 10E3/UL (ref 4–11)

## 2022-07-14 PROCEDURE — 36415 COLL VENOUS BLD VENIPUNCTURE: CPT

## 2022-07-14 PROCEDURE — 84460 ALANINE AMINO (ALT) (SGPT): CPT

## 2022-07-14 PROCEDURE — 82040 ASSAY OF SERUM ALBUMIN: CPT

## 2022-07-14 PROCEDURE — 84450 TRANSFERASE (AST) (SGOT): CPT

## 2022-07-14 PROCEDURE — 85027 COMPLETE CBC AUTOMATED: CPT

## 2022-08-09 ENCOUNTER — TRANSFERRED RECORDS (OUTPATIENT)
Dept: HEALTH INFORMATION MANAGEMENT | Facility: CLINIC | Age: 62
End: 2022-08-09

## 2022-08-09 LAB — HEP C HIM: NORMAL

## 2022-08-11 ENCOUNTER — TRANSFERRED RECORDS (OUTPATIENT)
Dept: HEALTH INFORMATION MANAGEMENT | Facility: CLINIC | Age: 62
End: 2022-08-11

## 2022-08-25 ENCOUNTER — TRANSFERRED RECORDS (OUTPATIENT)
Dept: HEALTH INFORMATION MANAGEMENT | Facility: CLINIC | Age: 62
End: 2022-08-25

## 2022-09-26 ENCOUNTER — DOCUMENTATION ONLY (OUTPATIENT)
Dept: LAB | Facility: CLINIC | Age: 62
End: 2022-09-26

## 2022-09-26 DIAGNOSIS — M05.79 RHEUMATOID ARTHRITIS, SEROPOSITIVE, MULTIPLE SITES (H): Primary | ICD-10-CM

## 2022-09-26 NOTE — PROGRESS NOTES
Please place orders for upcoming lab appointment on 9/30/23. Wants labs done before appt with Rheumatology on 10/6/22.   Thank you.

## 2022-09-30 ENCOUNTER — LAB (OUTPATIENT)
Dept: LAB | Facility: CLINIC | Age: 62
End: 2022-09-30
Payer: COMMERCIAL

## 2022-09-30 DIAGNOSIS — M05.79 RHEUMATOID ARTHRITIS, SEROPOSITIVE, MULTIPLE SITES (H): ICD-10-CM

## 2022-09-30 LAB
ALBUMIN SERPL-MCNC: 3.4 G/DL (ref 3.4–5)
ALT SERPL W P-5'-P-CCNC: 20 U/L (ref 0–50)
AST SERPL W P-5'-P-CCNC: 10 U/L (ref 0–45)
CREAT SERPL-MCNC: 0.86 MG/DL (ref 0.52–1.04)
CRP SERPL-MCNC: <2.9 MG/L (ref 0–8)
ERYTHROCYTE [DISTWIDTH] IN BLOOD BY AUTOMATED COUNT: 14.2 % (ref 10–15)
GFR SERPL CREATININE-BSD FRML MDRD: 77 ML/MIN/1.73M2
HCT VFR BLD AUTO: 40.6 % (ref 35–47)
HGB BLD-MCNC: 13.5 G/DL (ref 11.7–15.7)
MCH RBC QN AUTO: 32.2 PG (ref 26.5–33)
MCHC RBC AUTO-ENTMCNC: 33.3 G/DL (ref 31.5–36.5)
MCV RBC AUTO: 97 FL (ref 78–100)
PLATELET # BLD AUTO: 232 10E3/UL (ref 150–450)
RBC # BLD AUTO: 4.19 10E6/UL (ref 3.8–5.2)
WBC # BLD AUTO: 7.7 10E3/UL (ref 4–11)

## 2022-09-30 PROCEDURE — 36415 COLL VENOUS BLD VENIPUNCTURE: CPT

## 2022-09-30 PROCEDURE — 84460 ALANINE AMINO (ALT) (SGPT): CPT

## 2022-09-30 PROCEDURE — 82040 ASSAY OF SERUM ALBUMIN: CPT

## 2022-09-30 PROCEDURE — 82565 ASSAY OF CREATININE: CPT

## 2022-09-30 PROCEDURE — 84450 TRANSFERASE (AST) (SGOT): CPT

## 2022-09-30 PROCEDURE — 86140 C-REACTIVE PROTEIN: CPT

## 2022-09-30 PROCEDURE — 85027 COMPLETE CBC AUTOMATED: CPT

## 2022-10-05 NOTE — PROGRESS NOTES
Rheumatology Clinic Visit  Augustine Fields M.D.     Carolina oMra MRN# [id number]   YOB: 1960 Age: 62 year old     Date of Visit: 10/06/2022    Primary care provider: Michael Beltrán     Assessment:    # Seropositive RA (RF 49/ACPA 205), dx 7/2015. Hx rheumatoid nodules):  Patient relates continued complete freedom from joint pain, stiffness and swelling. Exam shows DIP angulation/nodulosis, stable left elbow contracture and no inflammatory changes.    Rheumatoid arthritis is in remission. I recommend continuing methotrexate 7.5mg weekly.  I recommend continuing Humira every 4 weeks.    # High risk medications.  Laboratory evaluation on 9-2022 showed creatinine, albumin, transaminases, CRP and CBC all normal or negative. Labs every 12-16 weeks. Up-to-date with the pneumonia vaccines and flu vaccine, but needs updated pneumococcal vaccine. S/p COVID vaccine X 4; may have 5th dose.    # Osteoarthritis, hands:  # Ulcerative colitis: Recent recurrence of bloody stools and diarrhea is certainly compatible with recurrence of ulcerative colitis, first diagnosed over 15 years ago, and symptomatically quiescent until recently.  Association between taper of Humira from every 3 weeks to every 4-week dosing schedule and the onset of symptoms attributable to ulcerative colitis raises the question of whether removal of anti-TNF has precipitated IBD flare.  Patient reports symptoms have improved since institution of budesonide by gastroenterology.  I recommend continuing budesonide, as well as methotrexate at 7.5 mg weekly and Humira at 40 mg every 4 weeks.  I recommend patient attend scheduled follow-up with gastroenterology in December.  If ulcerative colitis is incompletely responsive to budesonide, I will ask for GI opinion about potential for increasing Humira frequency with the dual purpose of suppressing inflammatory arthritis and ulcerative colitis    Plan:  1.  Continue methotrexate 3 tablets (7.5 mg)  once weekly. While on methotrexate:   -- Check labs every 4 months (AST/ALT, Albumin, CBC with platelets)   -- Limit alcohol intake to 2 drinks weekly; use folate 1 mg daily.  --Tylenol 500-1000 mg can be used as needed up to three times daily for nausea/headache associated with dosing.  2.  Continue Humira 40 mg subcutaneously at once every 4 weeks.  3. Continue budesonide per GI provider; consider increasing humira frequency to address active UC if budesonide is incompletely effective and if GI approves.  4. Update COVID vax and pneumovax at primary care.    Follow-up: 6 months    Augustine Fields MD  Staff Rheumatologist, [facility]      HPI: Carolina Mora  is here for follow-up rheumatoid arthritis (RA) +RF 49 CCP >205.  She was last seen in . Arthritis was judged in remission at that time, and patient was continued on low-dose methotrexate and Humira, with plans to continue humira every 4 weeks.     Review- +RF 49 CCP >205 -hep b/c 2015 12/2015 -MTB QG; XR hand 8-2016   Past: Pt initially referred to rheumatology 7/2015 for 6m history of polyarticular inflammatory arthritis and hand paresthesias. Found to have +RF/CCP and started on MTX and prednisone with significant improvement. Started on Humira 1/2016 given persistent inflammatory arthritis. Started to titrate down on MTX from 8 to 6 tabs spring 2017 with no flare. Tapered to 7.5 mg/wk in mid-2018. Humira frequency reduced in 2019 every 21 day 6-2019.    Interval history October 6, 2022    UC has been rediagnosed (untreated for > 10 years) with blood in the stool via colonoscopy per Dr. Malone at [facility]. She started Uceris several weeks ago and bleeding has stopped. She had to modify diet to avoid stomach discomfort/bloating and even rebleeding.    No joint pain/stiffness/swelling since last visit. She has no early morning stiffness. She walks regularly without restriction.    She has continued humira 40 mg every 4 weeks (down from q 3 weeks) and  methotrexate weekly with no changes in her symptoms sicne last visit.    Interval history April 14, 2022    No joint pain, no stiffness, no humira dosing cycle sypmtoms. ADLs not disrupted.  No early morning stiffness.  No night pain.  Taking humira q 3 weeks and methotrexate 7.5 mg weekly.       PMH:  Injury-left 4th finger fracture now contracture PIP   Past Medical History:  Dysplasia of cervix (had cryotx) 1990's  Hypothyroidism (07/31/2014) due to atrophy  Menopause  Nephrolithiasis (s/p ESWL) (01/11)  Rheumatoid arthritis   Ulcerative colitis originally dx after 3rd child 2008. treated follow-up  Colonoscopies on follow up showed no scar tissue.     High cholesterol -diet   Smoker    Ventral hernia   DEXA 2017 T-0.7 normal      Past Surgical History:  Colonoscopy (03/14/14)  Cryothearpy, cervical (early 1990's)  HC removal Gallbladder (and repair ventral hernia)(2/01)     Family History:   No psoriasis, UC, crohn's, SLE, RA, PsA, gout, autoimmune thyroid.  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 Right handed.      Current Outpatient Medications   Medication Sig     adalimumab (HUMIRA *CF* PEN) 40 MG/0.4ML pen kit INJECT 1 PEN (40 MG) SUBCUTANEOUSLY EVERY 28 DAYS (HOLD FOR SIGNS OF INFECTION, THEN SEEK MEDICAL ATTENTION. )     folic acid (FOLVITE) 1 MG tablet Take 1 tablet (1 mg) by mouth daily     levothyroxine (SYNTHROID/LEVOTHROID) 112 MCG tablet Take 1 tablet (112 mcg) by mouth daily      methotrexate 2.5 MG tablet Take 3 tablets (7.5 mg) by mouth every 7 days     No current facility-administered medications for this visit.        ROS:  Negative raynaud s phenomena, hairloss, sun sensitivities, keratoconjunctivitis sicca, pleurisy, inflammatory joint symptoms, significant rashes like malar, oral/nasal or ulcerations, inflammatory eye disease, inflammatory bowel disease, dactylitis, tenosynovitis, or enthespathy. Negative for miscarriages over 2 months into pregnancy, blood clots, gout, psoriasis, UC, crohn's. No temporal headache, no jaw claudication, no scalp tenderness, vision changes, carotidynia, cough. No STD or pregnancy symptoms. No Parotid swelling. Denies international travel. Denies history of pneumonia, hepatitis, tuberculosis, shingles, sepsis, tick bites.      CONSTITUTIONAL: No fevers, night sweats or unintentional weight change. No acute distress, swollen glands  EYES: No vision change, diplopia, pain in eyes or red eyes   EARS, NOSE, MOUTH, THROAT: No tinnitus or hearing change, no epistaxis or nasal discharge, no oral lesions, throat clear. Normal saliva pool.  No drymouth. No thyroid enlargement.   CARDIOVASCULAR: No chest pain, palpitations, or pain with walking, no orthopnea or PND   RESPIRATORY: No dyspnea, cough, shortness of breath or wheezing. No pleurisy.   GI: No nausea, vomiting, diarrhea or constipation, no abdominal pain, or blood in stools.   : No change in urine, no dysuria or hematuria   MUSCKL: No swollen, tender, red or painful joints. No nodules. No enthesitis, plantar fascitis or heel pain.   INTEGUMENTARY: No concerning lesions or moles   NEURO: No loss of strength or sensation, no numbness or tingling, no tremor, no dizziness, no headache. No falls   ENDO: No polyuria or polydipsia, no temperature intolerance   HEME/LYMPH:No concerning bumps, bleeding problems, or swollen lymph nodes.   ALLERGY: No environmental allergies   PSYCH:No depression or anxiety, no  "sleep problems.  Otherwise 14 point ROS obtained, reviewed and found negative.     Exam  Blood pressure 137/88, pulse 57, height 1.607 m (5' 3.25\"), weight 71.5 kg (157 lb 9.6 oz), SpO2 98 %, not currently breastfeeding.  Wt Readings from Last 4 Encounters:   10/06/22 71.5 kg (157 lb 9.6 oz)   04/14/22 73.1 kg (161 lb 1.6 oz)   11/29/21 74.8 kg (165 lb)   10/14/21 72.9 kg (160 lb 11.2 oz)       Constitutional: well-developed, appearing stated age; cooperative  Eyes: nl EOM, PERRLA, vision, conjunctiva, sclera  ENT: nl external ears, nose, hearing, lips, teeth, gums, throat  No mucous membrane lesions, normal saliva pool  Neck: no mass or thyroid enlargement  Resp: breathing unlabored  Lymph: no cervical, supraclavicular, inguinal or epitrochlear nodes  MS:   mild joint laxity of BL wrists   - L hand 4th PIP slight contraction flexure 2/2 prior fracture.   - Laxity of PIP joints without synovitis.  - angulation, firm bony enlargement at > 3 DIPs  - L elbow w/ 5 deg contraction flexure w/o active synovitis (unchanged)  - Bony enlargement at first MTP at both sides  Skin: no nail pitting, alopecia, rash, nodules or lesions  Neuro: nl cranial nerves, strength, sensation, DTRs.   Psych: nl judgement, orientation, memory, affect.       Labs/Imaging:  Component      Latest Ref Rng & Units 4/10/2020   WBC      4.0 - 11.0 10e9/L 7.4   RBC Count      3.8 - 5.2 10e12/L 3.93   Hemoglobin      11.7 - 15.7 g/dL 12.3   Hematocrit      35.0 - 47.0 % 37.2   MCV      78 - 100 fl 95   MCH      26.5 - 33.0 pg 31.3   MCHC      31.5 - 36.5 g/dL 33.1   RDW      10.0 - 15.0 % 13.2   Platelet Count      150 - 450 10e9/L 267   Creatinine      0.52 - 1.04 mg/dL 0.74   GFR Estimate      >60 mL/min/1.73:m2 89   GFR Estimate If Black      >60 mL/min/1.73:m2 >90   Albumin      3.4 - 5.0 g/dL 4.0   ALT      0 - 50 U/L 20   AST      0 - 45 U/L 13         "

## 2022-10-06 ENCOUNTER — OFFICE VISIT (OUTPATIENT)
Dept: RHEUMATOLOGY | Facility: CLINIC | Age: 62
End: 2022-10-06
Payer: COMMERCIAL

## 2022-10-06 VITALS
BODY MASS INDEX: 27.93 KG/M2 | DIASTOLIC BLOOD PRESSURE: 88 MMHG | OXYGEN SATURATION: 98 % | WEIGHT: 157.6 LBS | HEART RATE: 57 BPM | HEIGHT: 63 IN | SYSTOLIC BLOOD PRESSURE: 137 MMHG

## 2022-10-06 DIAGNOSIS — M05.79 RHEUMATOID ARTHRITIS INVOLVING MULTIPLE SITES WITH POSITIVE RHEUMATOID FACTOR (H): ICD-10-CM

## 2022-10-06 PROCEDURE — 99214 OFFICE O/P EST MOD 30 MIN: CPT | Performed by: INTERNAL MEDICINE

## 2022-10-06 RX ORDER — ADALIMUMAB 40MG/0.4ML
KIT SUBCUTANEOUS
Qty: 2 EACH | Refills: 6 | Status: SHIPPED | OUTPATIENT
Start: 2022-10-06 | End: 2024-05-23

## 2022-10-06 RX ORDER — FOLIC ACID 1 MG/1
1 TABLET ORAL DAILY
Qty: 90 TABLET | Refills: 3 | Status: SHIPPED | OUTPATIENT
Start: 2022-10-06 | End: 2023-06-08

## 2022-10-06 ASSESSMENT — PAIN SCALES - GENERAL: PAINLEVEL: NO PAIN (0)

## 2022-10-06 NOTE — NURSING NOTE
"Chief Complaint   Patient presents with     RECHECK     Rheumatoid arthritis involving multiple sites with positive rheumatoid factor (H)       Vitals:    10/06/22 1531   BP: 137/88   BP Location: Left arm   Patient Position: Sitting   Cuff Size: Adult Regular   Pulse: 57   SpO2: 98%   Weight: 71.5 kg (157 lb 9.6 oz)   Height: 1.607 m (5' 3.25\")       Body mass index is 27.7 kg/m .    Amara Colby, ICVF  "

## 2022-10-06 NOTE — PATIENT INSTRUCTIONS
Diagnosis:  1.  Rheumatoid arthritis: Symptoms and physical examination today suggest complete remission on current medication.  I recommend continuing combination methotrexate and Humira.  2.  Osteoarthritis, hands, early, mild: This process, separate from inflammatory arthritis, is causing very slow evolution of nodules and angulation in the joints at the ends of the fingers.  Immunomodulatory medication should not be expected to change the course of this very common and slowly progressing condition.  3. Ulcerative colitis: recent bleeding episodes better with addition of budesonide.      Plan:  1.  Continue methotrexate 3 tablets (7.5 mg) once weekly. While on methotrexate:   -- Check labs every 4 months (AST/ALT, Albumin, CBC with platelets)   -- Limit alcohol intake to 2 drinks weekly; use folate 1 mg daily.  --Tylenol 500-1000 mg can be used as needed up to three times daily for nausea/headache associated with dosing.  2.  Continue Humira 40 mg subcutaneously at once every 4 weeks.  3. Continue budesonide per GI provider; consider increasing humira frequency to address active UC if budesonide is incompletely effective and if GI approves.  4. Update COVID vax and pneumovax at primary care.

## 2022-10-17 ENCOUNTER — ANCILLARY PROCEDURE (OUTPATIENT)
Dept: MAMMOGRAPHY | Facility: CLINIC | Age: 62
End: 2022-10-17
Attending: FAMILY MEDICINE
Payer: COMMERCIAL

## 2022-10-17 DIAGNOSIS — Z12.31 VISIT FOR SCREENING MAMMOGRAM: ICD-10-CM

## 2022-10-17 PROCEDURE — 77063 BREAST TOMOSYNTHESIS BI: CPT | Mod: TC | Performed by: RADIOLOGY

## 2022-10-17 PROCEDURE — 77067 SCR MAMMO BI INCL CAD: CPT | Mod: TC | Performed by: RADIOLOGY

## 2022-11-27 ASSESSMENT — ENCOUNTER SYMPTOMS
NAUSEA: 0
JOINT SWELLING: 0
COUGH: 0
MYALGIAS: 0
WEAKNESS: 0
DIZZINESS: 0
DIARRHEA: 0
SHORTNESS OF BREATH: 0
ARTHRALGIAS: 0
CONSTIPATION: 0
EYE PAIN: 0
HEMATOCHEZIA: 0
PARESTHESIAS: 0
SORE THROAT: 0
HEMATURIA: 0
DYSURIA: 0
FEVER: 0
BREAST MASS: 0
PALPITATIONS: 0
HEADACHES: 0
CHILLS: 0
ABDOMINAL PAIN: 0
FREQUENCY: 0
NERVOUS/ANXIOUS: 0
HEARTBURN: 0

## 2022-12-01 NOTE — PATIENT INSTRUCTIONS
Thank you for choosing [facility] :)    Have a wonderful day     -Dr. Marguerite Bowers     Preventive Health Recommendations  Female Ages 50 - 64    Yearly exam: See your health care provider every year in order to  Review health changes.   Discuss preventive care.    Review your medicines if your doctor has prescribed any.    Get a Pap test every three years (unless you have an abnormal result and your provider advises testing more often).  If you get Pap tests with HPV test, you only need to test every 5 years, unless you have an abnormal result.   You do not need a Pap test if your uterus was removed (hysterectomy) and you have not had cancer.  You should be tested each year for STDs (sexually transmitted diseases) if you're at risk.   Have a mammogram every 1 to 2 years.  Have a colonoscopy at age 50, or have a yearly FIT test (stool test). These exams screen for colon cancer.    Have a cholesterol test every 5 years, or more often if advised.  Have a diabetes test (fasting glucose) every three years. If you are at risk for diabetes, you should have this test more often.   If you are at risk for osteoporosis (brittle bone disease), think about having a bone density scan (DEXA).    Shots: Get a flu shot each year. Get a tetanus shot every 10 years.    Nutrition:   Eat at least 5 servings of fruits and vegetables each day.  Eat whole-grain bread, whole-wheat pasta and brown rice instead of white grains and rice.  Get adequate Calcium and Vitamin D.     Lifestyle  Exercise at least 150 minutes a week (30 minutes a day, 5 days a week). This will help you control your weight and prevent disease.  Limit alcohol to one drink per day.  No smoking.   Wear sunscreen to prevent skin cancer.   See your dentist every six months for an exam and cleaning.  See your eye doctor every 1 to 2 years.

## 2022-12-02 ENCOUNTER — OFFICE VISIT (OUTPATIENT)
Dept: FAMILY MEDICINE | Facility: CLINIC | Age: 62
End: 2022-12-02
Payer: COMMERCIAL

## 2022-12-02 VITALS
RESPIRATION RATE: 14 BRPM | TEMPERATURE: 98.6 F | WEIGHT: 159.4 LBS | BODY MASS INDEX: 28.24 KG/M2 | HEIGHT: 63 IN | HEART RATE: 79 BPM | OXYGEN SATURATION: 98 % | SYSTOLIC BLOOD PRESSURE: 132 MMHG | DIASTOLIC BLOOD PRESSURE: 80 MMHG

## 2022-12-02 DIAGNOSIS — Z00.00 ROUTINE GENERAL MEDICAL EXAMINATION AT A HEALTH CARE FACILITY: Primary | ICD-10-CM

## 2022-12-02 DIAGNOSIS — Z23 NEED FOR VACCINATION: ICD-10-CM

## 2022-12-02 DIAGNOSIS — E03.4 HYPOTHYROIDISM DUE TO ACQUIRED ATROPHY OF THYROID: ICD-10-CM

## 2022-12-02 DIAGNOSIS — Z13.220 SCREENING FOR HYPERLIPIDEMIA: ICD-10-CM

## 2022-12-02 DIAGNOSIS — Z12.4 CERVICAL CANCER SCREENING: ICD-10-CM

## 2022-12-02 LAB
ANION GAP SERPL CALCULATED.3IONS-SCNC: 5 MMOL/L (ref 3–14)
BASOPHILS # BLD AUTO: 0 10E3/UL (ref 0–0.2)
BASOPHILS NFR BLD AUTO: 1 %
BUN SERPL-MCNC: 21 MG/DL (ref 7–30)
CALCIUM SERPL-MCNC: 9.6 MG/DL (ref 8.5–10.1)
CHLORIDE BLD-SCNC: 106 MMOL/L (ref 94–109)
CHOLEST SERPL-MCNC: 216 MG/DL
CO2 SERPL-SCNC: 28 MMOL/L (ref 20–32)
CREAT SERPL-MCNC: 0.85 MG/DL (ref 0.52–1.04)
EOSINOPHIL # BLD AUTO: 0.2 10E3/UL (ref 0–0.7)
EOSINOPHIL NFR BLD AUTO: 3 %
ERYTHROCYTE [DISTWIDTH] IN BLOOD BY AUTOMATED COUNT: 13.8 % (ref 10–15)
FASTING STATUS PATIENT QL REPORTED: YES
GFR SERPL CREATININE-BSD FRML MDRD: 78 ML/MIN/1.73M2
GLUCOSE BLD-MCNC: 97 MG/DL (ref 70–99)
HCT VFR BLD AUTO: 42.6 % (ref 35–47)
HDLC SERPL-MCNC: 51 MG/DL
HGB BLD-MCNC: 14.4 G/DL (ref 11.7–15.7)
LDLC SERPL CALC-MCNC: 129 MG/DL
LYMPHOCYTES # BLD AUTO: 1.6 10E3/UL (ref 0.8–5.3)
LYMPHOCYTES NFR BLD AUTO: 27 %
MCH RBC QN AUTO: 32.3 PG (ref 26.5–33)
MCHC RBC AUTO-ENTMCNC: 33.8 G/DL (ref 31.5–36.5)
MCV RBC AUTO: 96 FL (ref 78–100)
MONOCYTES # BLD AUTO: 0.7 10E3/UL (ref 0–1.3)
MONOCYTES NFR BLD AUTO: 12 %
NEUTROPHILS # BLD AUTO: 3.5 10E3/UL (ref 1.6–8.3)
NEUTROPHILS NFR BLD AUTO: 58 %
NONHDLC SERPL-MCNC: 165 MG/DL
PLATELET # BLD AUTO: 287 10E3/UL (ref 150–450)
POTASSIUM BLD-SCNC: 4.2 MMOL/L (ref 3.4–5.3)
RBC # BLD AUTO: 4.46 10E6/UL (ref 3.8–5.2)
SODIUM SERPL-SCNC: 139 MMOL/L (ref 133–144)
T4 FREE SERPL-MCNC: 1.48 NG/DL (ref 0.76–1.46)
TRIGL SERPL-MCNC: 182 MG/DL
TSH SERPL DL<=0.005 MIU/L-ACNC: 0.16 MU/L (ref 0.4–4)
WBC # BLD AUTO: 6 10E3/UL (ref 4–11)

## 2022-12-02 PROCEDURE — 84443 ASSAY THYROID STIM HORMONE: CPT | Performed by: STUDENT IN AN ORGANIZED HEALTH CARE EDUCATION/TRAINING PROGRAM

## 2022-12-02 PROCEDURE — 36415 COLL VENOUS BLD VENIPUNCTURE: CPT | Performed by: STUDENT IN AN ORGANIZED HEALTH CARE EDUCATION/TRAINING PROGRAM

## 2022-12-02 PROCEDURE — 90677 PCV20 VACCINE IM: CPT | Performed by: STUDENT IN AN ORGANIZED HEALTH CARE EDUCATION/TRAINING PROGRAM

## 2022-12-02 PROCEDURE — 87624 HPV HI-RISK TYP POOLED RSLT: CPT | Performed by: STUDENT IN AN ORGANIZED HEALTH CARE EDUCATION/TRAINING PROGRAM

## 2022-12-02 PROCEDURE — 99213 OFFICE O/P EST LOW 20 MIN: CPT | Mod: 25 | Performed by: STUDENT IN AN ORGANIZED HEALTH CARE EDUCATION/TRAINING PROGRAM

## 2022-12-02 PROCEDURE — 99396 PREV VISIT EST AGE 40-64: CPT | Mod: 25 | Performed by: STUDENT IN AN ORGANIZED HEALTH CARE EDUCATION/TRAINING PROGRAM

## 2022-12-02 PROCEDURE — 90471 IMMUNIZATION ADMIN: CPT | Performed by: STUDENT IN AN ORGANIZED HEALTH CARE EDUCATION/TRAINING PROGRAM

## 2022-12-02 PROCEDURE — G0145 SCR C/V CYTO,THINLAYER,RESCR: HCPCS | Performed by: STUDENT IN AN ORGANIZED HEALTH CARE EDUCATION/TRAINING PROGRAM

## 2022-12-02 PROCEDURE — 84439 ASSAY OF FREE THYROXINE: CPT | Performed by: STUDENT IN AN ORGANIZED HEALTH CARE EDUCATION/TRAINING PROGRAM

## 2022-12-02 PROCEDURE — 80061 LIPID PANEL: CPT | Performed by: STUDENT IN AN ORGANIZED HEALTH CARE EDUCATION/TRAINING PROGRAM

## 2022-12-02 PROCEDURE — 85025 COMPLETE CBC W/AUTO DIFF WBC: CPT | Performed by: STUDENT IN AN ORGANIZED HEALTH CARE EDUCATION/TRAINING PROGRAM

## 2022-12-02 PROCEDURE — 80048 BASIC METABOLIC PNL TOTAL CA: CPT | Performed by: STUDENT IN AN ORGANIZED HEALTH CARE EDUCATION/TRAINING PROGRAM

## 2022-12-02 RX ORDER — LEVOTHYROXINE SODIUM 112 UG/1
112 TABLET ORAL DAILY
Qty: 30 TABLET | Refills: 0 | Status: SHIPPED | OUTPATIENT
Start: 2022-12-02 | End: 2022-12-02

## 2022-12-02 RX ORDER — LEVOTHYROXINE SODIUM 100 UG/1
100 TABLET ORAL DAILY
Qty: 30 TABLET | Refills: 0 | Status: SHIPPED | OUTPATIENT
Start: 2022-12-02 | End: 2022-12-27

## 2022-12-02 ASSESSMENT — ENCOUNTER SYMPTOMS
NAUSEA: 0
CONSTIPATION: 0
HEARTBURN: 0
ARTHRALGIAS: 0
DIZZINESS: 0
PALPITATIONS: 0
FEVER: 0
JOINT SWELLING: 0
COUGH: 0
DIARRHEA: 0
HEMATURIA: 0
MYALGIAS: 0
SORE THROAT: 0
HEMATOCHEZIA: 0
DYSURIA: 0
SHORTNESS OF BREATH: 0
ABDOMINAL PAIN: 0
HEADACHES: 0
CHILLS: 0
FREQUENCY: 0
PARESTHESIAS: 0
NERVOUS/ANXIOUS: 0
EYE PAIN: 0
WEAKNESS: 0
BREAST MASS: 0

## 2022-12-02 NOTE — PROGRESS NOTES
SUBJECTIVE:   CC: Carolina is an 62 year old who presents for preventive health visit.   Patient has been advised of split billing requirements and indicates understanding: Yes  Healthy Habits:     Getting at least 3 servings of Calcium per day:  Yes    Bi-annual eye exam:  Yes    Dental care twice a year:  Yes    Sleep apnea or symptoms of sleep apnea:  None    Diet:  Regular (no restrictions)    Frequency of exercise:  2-3 days/week    Duration of exercise:  15-30 minutes    Taking medications regularly:  Yes    Medication side effects:  None    PHQ-2 Total Score: 0    Additional concerns today:  Yes          Hypothyroidism Follow-up      Since last visit, patient describes the following symptoms: Weight stable, no hair loss, no skin changes, no constipation, no loose stools      Today's PHQ-2 Score:   PHQ-2 (  [employer]) 2022   Q1: Little interest or pleasure in doing things 0   Q2: Feeling down, depressed or hopeless 0   PHQ-2 Score 0   PHQ-2 Total Score (12-17 Years)- Positive if 3 or more points; Administer PHQ-A if positive -   Q1: Little interest or pleasure in doing things Not at all   Q2: Feeling down, depressed or hopeless Not at all   PHQ-2 Score 0           Social History     Tobacco Use     Smoking status: Former     Types: Cigarettes     Quit date: 2/15/2020     Years since quittin.7     Smokeless tobacco: Never     Tobacco comments:     1 pack a week   Substance Use Topics     Alcohol use: Not Currently     Comment: occasional wine (twice per year)         Alcohol Use 2022   Prescreen: >3 drinks/day or >7 drinks/week? Not Applicable   Prescreen: >3 drinks/day or >7 drinks/week? -       Reviewed orders with patient.  Reviewed health maintenance and updated orders accordingly - Yes  Lab work is in process  Labs reviewed in EPIC    Breast Cancer Screening:    FHS-7:   Breast CA Risk Assessment (FHS-7) 10/15/2021 2021 10/17/2022 2022   Did any of your first-degree relatives  have breast or ovarian cancer? Yes Yes Yes Yes   Did any of your relatives have bilateral breast cancer? No No No Unknown   Did any man in your family have breast cancer? No No No No   Did any woman in your family have breast and ovarian cancer? No Yes No No   Did any woman in your family have breast cancer before age 50 y? No No No No   Do you have 2 or more relatives with breast and/or ovarian cancer? No Yes Yes Yes   Do you have 2 or more relatives with breast and/or bowel cancer? No Yes No No       Mammogram Screening: Recommended mammography every 1-2 years with patient discussion and risk factor consideration  Pertinent mammograms are reviewed under the imaging tab.    History of abnormal Pap smear: NO - age 30-65 PAP every 5 years with negative HPV co-testing recommended  PAP / HPV Latest Ref Rng & Units 6/30/2017 7/26/2013 7/24/2012   PAP (Historical) - NIL NIL NIL   HPV16 NEG Negative - -   HPV18 NEG Negative - -   HRHPV NEG Negative - -     Reviewed and updated as needed this visit by clinical staff   Tobacco  Allergies  Meds              Reviewed and updated as needed this visit by Provider                     Review of Systems   Constitutional: Negative for chills and fever.   HENT: Negative for congestion, ear pain, hearing loss and sore throat.    Eyes: Negative for pain and visual disturbance.   Respiratory: Negative for cough and shortness of breath.    Cardiovascular: Negative for chest pain, palpitations and peripheral edema.   Gastrointestinal: Negative for abdominal pain, constipation, diarrhea, heartburn, hematochezia and nausea.   Breasts:  Negative for tenderness, breast mass and discharge.   Genitourinary: Negative for dysuria, frequency, genital sores, hematuria, pelvic pain, urgency, vaginal bleeding and vaginal discharge.   Musculoskeletal: Negative for arthralgias, joint swelling and myalgias.   Skin: Negative for rash.   Neurological: Negative for dizziness, weakness, headaches and  "paresthesias.   Psychiatric/Behavioral: Negative for mood changes. The patient is not nervous/anxious.           OBJECTIVE:   /80   Pulse 79   Temp 98.6  F (37  C) (Oral)   Resp 14   Ht 1.607 m (5' 3.25\")   Wt 72.3 kg (159 lb 6.4 oz)   SpO2 98%   BMI 28.01 kg/m    Physical Exam  GENERAL: healthy, alert and no distress  EYES: Eyes grossly normal to inspection, PERRL and conjunctivae and sclerae normal  HENT: ear canals and TM's normal, nose and mouth without ulcers or lesions  NECK: no adenopathy, no asymmetry, masses, or scars and thyroid normal to palpation  RESP: lungs clear to auscultation - no rales, rhonchi or wheezes  CV: regular rate and rhythm, normal S1 S2, no S3 or S4, no murmur, click or rub, no peripheral edema and peripheral pulses strong  ABDOMEN: soft, nontender, no hepatosplenomegaly, no masses and bowel sounds normal   (female): normal female external genitalia, normal urethral meatus, vaginal mucosa, normal cervix/adnexa/uterus without masses or discharge  MS: no gross musculoskeletal defects noted, no edema  SKIN: keratoses - seborrheic: 1-2 cm lesion located in mid thoracic. Uniform in symmetry with mild hypopigmentation in center. Scaly appearance throughout. 4 cherry angiomas located over left posterior shoulder and mid back   NEURO: Normal strength and tone, mentation intact and speech normal  PSYCH: mentation appears normal, affect normal/bright    Diagnostic Test Results:  Labs reviewed in Epic  No results found for any visits on 12/02/22.    ASSESSMENT/PLAN:   (Z00.00) Routine general medical examination at a health care facility  (primary encounter diagnosis)  Comment: Stable   Plan: REVIEW OF HEALTH MAINTENANCE PROTOCOL ORDERS,         TSH WITH FREE T4 REFLEX, CBC with platelets and        differential, Basic metabolic panel  (Ca, Cl,         CO2, Creat, Gluc, K, Na, BUN)            (Z12.4) Cervical cancer screening  Comment: Stable. Prior pap smear noted to be " "unremarkable. Pap smear completed during an office visit.  Adequate sampling taken during Pap smear and sent to lab. Pending pathology results. Patient informed that results will be available via [employer] or they will be contacted by our pap nursing staff- verbalized understanding.   Plan: Pap Screen with HPV - recommended age 30 - 65         years           (Z23) Need for vaccination  Comment: Stable   Plan: Pneumococcal 20 Valent Conjugate (Prevnar 20)        Pt received prevnar 20 vaccine(s) today in clinic      (Z13.220) Screening for hyperlipidemia  Comment: Stable  Plan: Lipid panel reflex to direct LDL Fasting        Pending labs      (E03.4) Hypothyroidism due to acquired atrophy of thyroid  Comment: Stable.Chronic. Prior TSH level noted to be within normal limits. Will reorder TSH level today. Will send current dosage of Levothyroxine however patient informed that depending on lab levels- dose may have to be adjusted. Pt verbalized understanding.  Plan: levothyroxine (SYNTHROID/LEVOTHROID) 112 MCG         tablet        Pending pickup of medications from pharmacy        Patient has been advised of split billing requirements and indicates understanding: Yes      COUNSELING:  Reviewed preventive health counseling, as reflected in patient instructions       Regular exercise       Healthy diet/nutrition       Immunizations    Vaccinated for: Pneumococcal             Advance Care Planning      BMI:   Estimated body mass index is 28.01 kg/m  as calculated from the following:    Height as of this encounter: 1.607 m (5' 3.25\").    Weight as of this encounter: 72.3 kg (159 lb 6.4 oz).   Weight management plan: Discussed healthy diet and exercise guidelines      She reports that she quit smoking about 2 years ago. Her smoking use included cigarettes. She has never used smokeless tobacco.          HUSSEIN NARVAEZ MD  [facility]  "

## 2022-12-06 LAB
BKR LAB AP GYN ADEQUACY: NORMAL
BKR LAB AP GYN INTERPRETATION: NORMAL
BKR LAB AP HPV REFLEX: NORMAL
BKR LAB AP PREVIOUS ABNORMAL: NORMAL
PATH REPORT.COMMENTS IMP SPEC: NORMAL
PATH REPORT.COMMENTS IMP SPEC: NORMAL
PATH REPORT.RELEVANT HX SPEC: NORMAL

## 2022-12-07 LAB
HUMAN PAPILLOMA VIRUS 16 DNA: NEGATIVE
HUMAN PAPILLOMA VIRUS 18 DNA: NEGATIVE
HUMAN PAPILLOMA VIRUS FINAL DIAGNOSIS: NORMAL
HUMAN PAPILLOMA VIRUS OTHER HR: NEGATIVE

## 2022-12-08 PROBLEM — Z12.4 SCREENING FOR CERVICAL CANCER: Status: ACTIVE | Noted: 2022-12-08

## 2022-12-09 ENCOUNTER — TRANSFERRED RECORDS (OUTPATIENT)
Dept: HEALTH INFORMATION MANAGEMENT | Facility: CLINIC | Age: 62
End: 2022-12-09

## 2022-12-13 ENCOUNTER — TELEPHONE (OUTPATIENT)
Dept: RHEUMATOLOGY | Facility: CLINIC | Age: 62
End: 2022-12-13

## 2022-12-13 NOTE — TELEPHONE ENCOUNTER
Pt rescheduled for 06/08/23 would like to know if she can be seen sooner. Please reach out to pt.

## 2023-01-13 ENCOUNTER — LAB (OUTPATIENT)
Dept: LAB | Facility: CLINIC | Age: 63
End: 2023-01-13
Payer: COMMERCIAL

## 2023-01-13 DIAGNOSIS — M05.79 RHEUMATOID ARTHRITIS, SEROPOSITIVE, MULTIPLE SITES (H): ICD-10-CM

## 2023-01-13 DIAGNOSIS — E03.4 HYPOTHYROIDISM DUE TO ACQUIRED ATROPHY OF THYROID: ICD-10-CM

## 2023-01-13 LAB
ALBUMIN SERPL-MCNC: 3.8 G/DL (ref 3.4–5)
ALT SERPL W P-5'-P-CCNC: 22 U/L (ref 0–50)
AST SERPL W P-5'-P-CCNC: 15 U/L (ref 0–45)
CREAT SERPL-MCNC: 0.78 MG/DL (ref 0.52–1.04)
CRP SERPL-MCNC: <2.9 MG/L (ref 0–8)
ERYTHROCYTE [DISTWIDTH] IN BLOOD BY AUTOMATED COUNT: 13.2 % (ref 10–15)
GFR SERPL CREATININE-BSD FRML MDRD: 86 ML/MIN/1.73M2
HCT VFR BLD AUTO: 42.5 % (ref 35–47)
HGB BLD-MCNC: 14.6 G/DL (ref 11.7–15.7)
MCH RBC QN AUTO: 32.4 PG (ref 26.5–33)
MCHC RBC AUTO-ENTMCNC: 34.4 G/DL (ref 31.5–36.5)
MCV RBC AUTO: 94 FL (ref 78–100)
PLATELET # BLD AUTO: 267 10E3/UL (ref 150–450)
RBC # BLD AUTO: 4.51 10E6/UL (ref 3.8–5.2)
TSH SERPL DL<=0.005 MIU/L-ACNC: 0.51 MU/L (ref 0.4–4)
WBC # BLD AUTO: 6.4 10E3/UL (ref 4–11)

## 2023-01-13 PROCEDURE — 84443 ASSAY THYROID STIM HORMONE: CPT

## 2023-01-13 PROCEDURE — 84450 TRANSFERASE (AST) (SGOT): CPT

## 2023-01-13 PROCEDURE — 82565 ASSAY OF CREATININE: CPT

## 2023-01-13 PROCEDURE — 82040 ASSAY OF SERUM ALBUMIN: CPT

## 2023-01-13 PROCEDURE — 84460 ALANINE AMINO (ALT) (SGPT): CPT

## 2023-01-13 PROCEDURE — 85027 COMPLETE CBC AUTOMATED: CPT

## 2023-01-13 PROCEDURE — 36415 COLL VENOUS BLD VENIPUNCTURE: CPT

## 2023-01-13 PROCEDURE — 86140 C-REACTIVE PROTEIN: CPT

## 2023-01-30 DIAGNOSIS — E03.4 HYPOTHYROIDISM DUE TO ACQUIRED ATROPHY OF THYROID: ICD-10-CM

## 2023-01-30 RX ORDER — LEVOTHYROXINE SODIUM 100 UG/1
TABLET ORAL
Qty: 30 TABLET | Refills: 11 | Status: SHIPPED | OUTPATIENT
Start: 2023-01-30 | End: 2023-12-08

## 2023-03-27 ENCOUNTER — TELEPHONE (OUTPATIENT)
Dept: RHEUMATOLOGY | Facility: CLINIC | Age: 63
End: 2023-03-27
Payer: COMMERCIAL

## 2023-03-27 NOTE — TELEPHONE ENCOUNTER
Prior Authorization Approval    Authorization Effective Date: 3/26/2023  Authorization Expiration Date: 3/26/2024  Medication: Humira renewal  Approved Dose/Quantity: [id number]  Reference #: [id number]   Insurance Company: [employer] - Phone [phone] Fax [phone]  Expected CoPay:       CoPay Card Available:      Foundation Assistance Needed:    Which [facility]Not needed for infusion[facility]cl[facility]  Pharmacy Notified: Yes  Patient Notified: Yes

## 2023-05-04 ENCOUNTER — LAB (OUTPATIENT)
Dept: LAB | Facility: CLINIC | Age: 63
End: 2023-05-04
Payer: COMMERCIAL

## 2023-05-04 DIAGNOSIS — M05.79 RHEUMATOID ARTHRITIS, SEROPOSITIVE, MULTIPLE SITES (H): ICD-10-CM

## 2023-05-04 LAB
ALBUMIN SERPL BCG-MCNC: 4.1 G/DL (ref 3.5–5.2)
ALT SERPL W P-5'-P-CCNC: 19 U/L (ref 10–35)
AST SERPL W P-5'-P-CCNC: 25 U/L (ref 10–35)
CREAT SERPL-MCNC: 0.84 MG/DL (ref 0.51–0.95)
CRP SERPL-MCNC: <3 MG/L
ERYTHROCYTE [DISTWIDTH] IN BLOOD BY AUTOMATED COUNT: 13.8 % (ref 10–15)
GFR SERPL CREATININE-BSD FRML MDRD: 79 ML/MIN/1.73M2
HCT VFR BLD AUTO: 41.5 % (ref 35–47)
HGB BLD-MCNC: 14.1 G/DL (ref 11.7–15.7)
MCH RBC QN AUTO: 31.5 PG (ref 26.5–33)
MCHC RBC AUTO-ENTMCNC: 34 G/DL (ref 31.5–36.5)
MCV RBC AUTO: 93 FL (ref 78–100)
PLATELET # BLD AUTO: 258 10E3/UL (ref 150–450)
RBC # BLD AUTO: 4.48 10E6/UL (ref 3.8–5.2)
WBC # BLD AUTO: 5.7 10E3/UL (ref 4–11)

## 2023-05-04 PROCEDURE — 36415 COLL VENOUS BLD VENIPUNCTURE: CPT

## 2023-05-04 PROCEDURE — 86140 C-REACTIVE PROTEIN: CPT

## 2023-05-04 PROCEDURE — 84450 TRANSFERASE (AST) (SGOT): CPT

## 2023-05-04 PROCEDURE — 85027 COMPLETE CBC AUTOMATED: CPT

## 2023-05-04 PROCEDURE — 84460 ALANINE AMINO (ALT) (SGPT): CPT

## 2023-05-04 PROCEDURE — 82565 ASSAY OF CREATININE: CPT

## 2023-05-04 PROCEDURE — 82040 ASSAY OF SERUM ALBUMIN: CPT

## 2023-06-06 NOTE — PROGRESS NOTES
Rheumatology Clinic Visit  Augustine Fields M.D.     Carolina Mora MRN# [id number]   YOB: 1960 Age: 62 year old     Date of Visit: 06/08/2023    Primary care provider: Michael Beltrán     Assessment:    # Seropositive RA (RF 49/ACPA 205), dx 7/2015. + Hx rheumatoid nodules):  Patient relates continued complete freedom from joint pain, stiffness and swelling. Exam shows DIP angulation/nodulosis, stable left elbow contracture and no inflammatory changes, no nodules.    Rheumatoid arthritis is in remission.  Inflammatory symptoms have not been present for greater than 3 years on very infrequent Humira and very low-dose methotrexate.  I recommend a trial of discontinuing immunomodulatory medication, 1 at a time.    # High risk medications. Lab work on May 24, 2023 showed creatinine, albumin, transaminases, CRP, and CBC all normal or negative. Labs every 12-16 weeks while taking methotrexate.    # Osteoarthritis, hands:  # Ulcerative colitis: No current symptoms.      Plan:  1. Stop humira  Stop methotrexate at the end of 8-2023 if discontinuation of Humira resulted in no increase in symptoms.Meanwhile, while on methotrexate, While on methotrexate:   Check blood tests every 3 months (AST/ALT, Albumin, CBC with platelets)   Limit alcohol intake to 2 drinks weekly; use folate 1 mg daily.  Tylenol 500-1000 mg can be used as needed up to three times daily for nausea/headache associated with dosing    Report to rheumatology if sustained symptoms joints or gut return and are sustained for more than a week.    Follow-up: 6 months    Augustine Fields MD  Staff Rheumatologist, [facility]      HPI: Carolina Mora  is here for follow-up rheumatoid arthritis (RA) +RF 49 CCP >205.  She was last seen in . Arthritis was judged in remission at that time, and patient was continued on low-dose methotrexate and Humira, with plans to continue humira every 4 weeks.     Review- +RF 49 CCP >205 -hep b/c 2015 12/2015 -MTB  "QG; XR hand 8-2016   Past: Pt initially referred to rheumatology 7/2015 for 6m history of polyarticular inflammatory arthritis and hand paresthesias. Found to have +RF/CCP and started on MTX and prednisone with significant improvement. Started on Humira 1/2016 given persistent inflammatory arthritis. Started to titrate down on MTX from 8 to 6 tabs spring 2017 with no flare. Tapered to 7.5 mg/wk in mid-2018. Humira frequency reduced in 2019 every 21 day 6-2019.    Interval history June 8, 2023    She notes brief (1 minute) neck discomfort occurring several times a day. Otherwise no joint swelling, stiffness. No ADL disruption. Sometimes in her feet, she notes \"need to get up and move\"  No early morning stiffness or morning foot pain.  She cites increased work stress recently; she still works 40 hour weeks.    GI symptoms have improved after Ulceris Rx; no further blood in the stool. Dr. Malone thought that no immune modulating medication needed for Rx of UC if Rx for RA was not needed.    Interval history October 6, 2022    UC has been rediagnosed (untreated for > 10 years) with blood in the stool via colonoscopy per Dr. Malone at [facility]. She started Uceris several weeks ago and bleeding has stopped. She had to modify diet to avoid stomach discomfort/bloating and even rebleeding.    No joint pain/stiffness/swelling since last visit. She has no early morning stiffness. She walks regularly without restriction.    She has continued humira 40 mg every 4 weeks (down from q 3 weeks) and methotrexate weekly with no changes in her symptoms sicne last visit.    Interval history April 14, 2022    No joint pain, no stiffness, no humira dosing cycle sypmtoms. ADLs not disrupted.  No early morning stiffness.  No night pain.  Taking humira q 3 weeks and methotrexate 7.5 mg weekly.       PMH:  Injury-left 4th finger fracture now contracture PIP   Past Medical History:  Dysplasia of cervix (had cryotx) 1990's  Hypothyroidism (07/31/2014) " due to atrophy  Menopause  Nephrolithiasis (s/p ESWL) (01/11)  Rheumatoid arthritis   Ulcerative colitis originally dx after 3rd child 2008. treated follow-up  Colonoscopies on follow up showed no scar tissue.     High cholesterol -diet   Smoker    Ventral hernia   DEXA 2017 T-0.7 normal      Past Surgical History:  Colonoscopy (03/14/14)  Cryothearpy, cervical (early 1990's)  HC removal Gallbladder (and repair ventral hernia)(2/01)     Family History:   No psoriasis, UC, crohn's, SLE, RA, PsA, gout, autoimmune thyroid.  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 Right handed.      Current Outpatient Medications   Medication Sig     adalimumab (HUMIRA *CF* PEN) 40 MG/0.4ML pen kit INJECT 1 PEN (40 MG) SUBCUTANEOUSLY EVERY 28 DAYS (HOLD FOR SIGNS OF INFECTION, THEN SEEK MEDICAL ATTENTION. )     folic acid (FOLVITE) 1 MG tablet Take 1 tablet (1 mg) by mouth daily     levothyroxine (SYNTHROID/LEVOTHROID) 100 MCG tablet TAKE 1 TABLET DAILY     methotrexate 2.5 MG tablet Take 3 tablets (7.5 mg) by mouth every 7 days     No current facility-administered medications for this visit.        ROS:  Negative raynaud s phenomena, hairloss, sun sensitivities, keratoconjunctivitis sicca, pleurisy, inflammatory joint symptoms, significant rashes like malar, oral/nasal or ulcerations, inflammatory eye disease, inflammatory bowel disease, dactylitis, tenosynovitis, or enthespathy. Negative for miscarriages over 2 months into  "pregnancy, blood clots, gout, psoriasis, UC, crohn's. No temporal headache, no jaw claudication, no scalp tenderness, vision changes, carotidynia, cough. No STD or pregnancy symptoms. No Parotid swelling. Denies international travel. Denies history of pneumonia, hepatitis, tuberculosis, shingles, sepsis, tick bites.      CONSTITUTIONAL: No fevers, night sweats or unintentional weight change. No acute distress, swollen glands  EYES: No vision change, diplopia, pain in eyes or red eyes   EARS, NOSE, MOUTH, THROAT: No tinnitus or hearing change, no epistaxis or nasal discharge, no oral lesions, throat clear. Normal saliva pool.  No drymouth. No thyroid enlargement.   CARDIOVASCULAR: No chest pain, palpitations, or pain with walking, no orthopnea or PND   RESPIRATORY: No dyspnea, cough, shortness of breath or wheezing. No pleurisy.   GI: No nausea, vomiting, diarrhea or constipation, no abdominal pain, or blood in stools.   : No change in urine, no dysuria or hematuria   MUSCKL: No swollen, tender, red or painful joints. No nodules. No enthesitis, plantar fascitis or heel pain.   INTEGUMENTARY: No concerning lesions or moles   NEURO: No loss of strength or sensation, no numbness or tingling, no tremor, no dizziness, no headache. No falls   ENDO: No polyuria or polydipsia, no temperature intolerance   HEME/LYMPH:No concerning bumps, bleeding problems, or swollen lymph nodes.   ALLERGY: No environmental allergies   PSYCH:No depression or anxiety, no sleep problems.  Otherwise 14 point ROS obtained, reviewed and found negative.     Exam  Blood pressure (!) 152/83, pulse 63, height 1.607 m (5' 3.25\"), weight 71.4 kg (157 lb 8 oz), SpO2 98 %, not currently breastfeeding.  Wt Readings from Last 4 Encounters:   06/08/23 71.4 kg (157 lb 8 oz)   12/02/22 72.3 kg (159 lb 6.4 oz)   10/06/22 71.5 kg (157 lb 9.6 oz)   04/14/22 73.1 kg (161 lb 1.6 oz)       Constitutional: well-developed, appearing stated age; cooperative  Eyes: nl " EOM, PERRLA, vision, conjunctiva, sclera  ENT: nl external ears, nose, hearing, lips, teeth, gums, throat  No mucous membrane lesions, normal saliva pool  Neck: no mass or thyroid enlargement  Resp: breathing unlabored  Lymph: no cervical, supraclavicular, inguinal or epitrochlear nodes  MS:   mild joint laxity of BL wrists   - L hand 4th PIP slight contraction flexure 2/2 prior fracture.   - Laxity of PIP joints without synovitis.  - angulation, firm bony enlargement at > 3 DIPs  - L elbow w/ 5 deg contraction flexure w/o active synovitis (unchanged)  - Bony enlargement at first MTP at both sides  No synovitis    Skin: no nail pitting, alopecia, rash, nodules or lesions  Neuro: nl cranial nerves, strength, sensation, DTRs.   Psych: nl judgement, orientation, memory, affect.       Labs/Imaging:  Component      Latest Ref Rng & Units 4/10/2020   WBC      4.0 - 11.0 10e9/L 7.4   RBC Count      3.8 - 5.2 10e12/L 3.93   Hemoglobin      11.7 - 15.7 g/dL 12.3   Hematocrit      35.0 - 47.0 % 37.2   MCV      78 - 100 fl 95   MCH      26.5 - 33.0 pg 31.3   MCHC      31.5 - 36.5 g/dL 33.1   RDW      10.0 - 15.0 % 13.2   Platelet Count      150 - 450 10e9/L 267   Creatinine      0.52 - 1.04 mg/dL 0.74   GFR Estimate      >60 mL/min/1.73:m2 89   GFR Estimate If Black      >60 mL/min/1.73:m2 >90   Albumin      3.4 - 5.0 g/dL 4.0   ALT      0 - 50 U/L 20   AST      0 - 45 U/L 13

## 2023-06-08 ENCOUNTER — OFFICE VISIT (OUTPATIENT)
Dept: RHEUMATOLOGY | Facility: CLINIC | Age: 63
End: 2023-06-08
Payer: COMMERCIAL

## 2023-06-08 VITALS
OXYGEN SATURATION: 98 % | HEART RATE: 63 BPM | WEIGHT: 157.5 LBS | SYSTOLIC BLOOD PRESSURE: 152 MMHG | BODY MASS INDEX: 27.91 KG/M2 | HEIGHT: 63 IN | DIASTOLIC BLOOD PRESSURE: 83 MMHG

## 2023-06-08 DIAGNOSIS — M05.79 RHEUMATOID ARTHRITIS INVOLVING MULTIPLE SITES WITH POSITIVE RHEUMATOID FACTOR (H): ICD-10-CM

## 2023-06-08 PROCEDURE — 99214 OFFICE O/P EST MOD 30 MIN: CPT | Performed by: INTERNAL MEDICINE

## 2023-06-08 RX ORDER — FOLIC ACID 1 MG/1
1 TABLET ORAL DAILY
Qty: 90 TABLET | Refills: 3 | Status: SHIPPED | OUTPATIENT
Start: 2023-06-08

## 2023-06-08 ASSESSMENT — PAIN SCALES - GENERAL: PAINLEVEL: NO PAIN (0)

## 2023-06-08 NOTE — NURSING NOTE
"Chief Complaint   Patient presents with     RECHECK     Rheumatoid arthritis involving multiple sites with positive rheumatoid factor (H)       Vitals:    06/08/23 1057   BP: (!) 152/83   BP Location: Left arm   Patient Position: Sitting   Cuff Size: Adult Regular   Pulse: 63   SpO2: 98%   Weight: 71.4 kg (157 lb 8 oz)   Height: 1.607 m (5' 3.25\")       Body mass index is 27.68 kg/m .    Amara Colby, ICVF  "

## 2023-06-08 NOTE — PATIENT INSTRUCTIONS
Diagnosis:  1.  Rheumatoid arthritis: Symptoms and physical examination today suggest complete remission. I recommend trial discontinuation of humira, then methotrexate, to determine whether the drugs are needed.  2.  Osteoarthritis, hands, early, mild  3. Ulcerative colitis: no recurrence of bleeding.    Plan:  Stop humira  Stop methotrexate at the end of 8-2023 if discontinuation of Humira resulted in no increase in symptoms. symptoms.  Report to rheumatology if sustained symptoms joints or gut return for more than a week.

## 2023-06-15 ENCOUNTER — VIRTUAL VISIT (OUTPATIENT)
Dept: FAMILY MEDICINE | Facility: CLINIC | Age: 63
End: 2023-06-15
Payer: COMMERCIAL

## 2023-06-15 DIAGNOSIS — K51.30 ULCERATIVE RECTOSIGMOIDITIS WITHOUT COMPLICATION (H): ICD-10-CM

## 2023-06-15 DIAGNOSIS — M05.79 RHEUMATOID ARTHRITIS INVOLVING MULTIPLE SITES WITH POSITIVE RHEUMATOID FACTOR (H): ICD-10-CM

## 2023-06-15 DIAGNOSIS — D84.9 IMMUNOCOMPROMISED (H): ICD-10-CM

## 2023-06-15 DIAGNOSIS — R05.1 ACUTE COUGH: Primary | ICD-10-CM

## 2023-06-15 PROCEDURE — 99213 OFFICE O/P EST LOW 20 MIN: CPT | Mod: 95 | Performed by: PHYSICIAN ASSISTANT

## 2023-06-15 ASSESSMENT — ENCOUNTER SYMPTOMS: COUGH: 1

## 2023-06-15 NOTE — PROGRESS NOTES
"Carolina is a 62 year old who is being evaluated via a billable video visit.    How would you like to obtain your AVS? MyChart  If the video visit is dropped, the invitation should be resent by: Text to cell phone: [phone]  Will anyone else be joining your video visit? No      Assessment & Plan     Acute cough - given immunosuppression, recommend CXR to r/o pneumonia. If negative, recommend ongoing symptomatic management. If she develops fever, constitutional symptoms, worsened cough, recommend repeat eval/call nurse.  - XR Chest 2 Views    Immunocompromised (H)  Ulcerative rectosigmoiditis without complication (H)  Rheumatoid arthritis involving multiple sites with positive rheumatoid factor (H) - as above, given immunosuppression will do further evaluation. On humira & methotrexate, follows with rheumatology & GI. No change to treatment plans.       Isaura Velasco PA-C  [facility]    Mark Figueroa is a 62 year old, presenting for the following health issues:  Cough         View : No data to display.              Cough started this weekend  Constant dry, irritating cough  Yesterday cough was really severe while in the office  Can't take a deep breath and breath \"catches\" and causes coughing  Feels similar to when she's had bronchitis in the past  Mild productive phlegm this morning but back to dry  Overall feeling well - no fever, congestion, malaise  Hasn't done any OTC cough medications     Cough    History of Present Illness       Reason for visit:  Worsening cough with phlegm  Symptom onset:  3-7 days ago  Symptoms include:  Mostly dry cough, phlegm started today, chest feels heavy and tight.  Symptom intensity:  Moderate  Symptom progression:  Worsening  Had these symptoms before:  No  What makes it worse:  No  What makes it better:  No    She eats 2-3 servings of fruits and vegetables daily.She consumes 0 sweetened beverage(s) daily.She exercises with enough effort to increase " her heart rate 30 to 60 minutes per day.  She exercises with enough effort to increase her heart rate 4 days per week.   She is taking medications regularly.     Review of Systems   Respiratory: Positive for cough.           Objective         Vitals:  No vitals were obtained today due to virtual visit.    Physical Exam   GENERAL: Healthy, alert and no distress  EYES: Eyes grossly normal to inspection.  No discharge or erythema, or obvious scleral/conjunctival abnormalities.  RESP: No audible wheeze, cough, or visible cyanosis.  No visible retractions or increased work of breathing.    SKIN: Visible skin clear. No significant rash, abnormal pigmentation or lesions.  NEURO: Cranial nerves grossly intact.  Mentation and speech appropriate for age.  PSYCH: Mentation appears normal, affect normal/bright, judgement and insight intact, normal speech and appearance well-groomed.     Video-Visit Details    Type of service:  Video Visit     Originating Location (pt. Location): Home    Distant Location (provider location):  On-site  Platform used for Video Visit: Jovanny

## 2023-06-16 ENCOUNTER — ANCILLARY PROCEDURE (OUTPATIENT)
Dept: GENERAL RADIOLOGY | Facility: CLINIC | Age: 63
End: 2023-06-16
Attending: PHYSICIAN ASSISTANT
Payer: COMMERCIAL

## 2023-06-16 DIAGNOSIS — R05.1 ACUTE COUGH: ICD-10-CM

## 2023-06-16 PROCEDURE — 71046 X-RAY EXAM CHEST 2 VIEWS: CPT | Mod: TC | Performed by: RADIOLOGY

## 2023-10-11 NOTE — RESULT ENCOUNTER NOTE
HPI:  Presents for annual exam.  Right lower back pain is slowly improving. No radicular symptoms.   Intermittend post headache. Not predicatble.  Will occur about 4 to 5 days/week. No focal symptoms.  Perhaps worse with stress and work.  Headache will last hours.  It does respond to acetaminophen..   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   Never smoker.   ETOH- couple beers per week.   Kids doing well. 6 and 9.   Home BPs 130-140 range for the most part.  Occasional 120s.  Diastolic BP in the 80s to 90s    Patient's medications, allergies, past medical, surgical, social and family histories were reviewed and updated as noted in the chart.    ROS:  Review of Systems   Constitutional: Negative for appetite change, fatigue, fever and unexpected weight change.   HENT: Negative for congestion, hearing loss, postnasal drip and rhinorrhea.    Eyes: Negative for visual disturbance.   Respiratory: Negative for cough, chest tightness, shortness of breath and wheezing.    Cardiovascular: Negative for chest pain, palpitations and leg swelling.   Gastrointestinal: Negative for abdominal pain, anal bleeding, blood in stool, constipation, diarrhea, nausea and vomiting.   Genitourinary: Negative for dysuria, frequency, hematuria and urgency.        Occasional overnight void   Musculoskeletal: Positive for back pain (See hpi- non radicular.). Negative for arthralgias and gait problem.   Skin: Negative for rash.   Neurological: Positive for headaches. Negative for dizziness.   Psychiatric/Behavioral: Negative for sleep disturbance.       Objective:  Visit Vitals  /89   Pulse 64   Temp 96.2 °F (35.7 °C) (Tympanic)   Resp 16   Ht 5' 4\" (1.626 m)   Wt 76.2 kg (168 lb)   BMI 28.84 kg/m²     Physical Exam  Vitals reviewed.   Constitutional:       Appearance: He is not ill-appearing.   HENT:      Head: Normocephalic and atraumatic.      Right Ear: Tympanic membrane, ear canal and external ear normal.      Left Ear: Tympanic membrane, ear  The blood counts, liver, kidney labs are normal.     Joe ORTEGA, CNP, MSN  4/14/2020  8:11 AM     canal and external ear normal.      Mouth/Throat:      Pharynx: No oropharyngeal exudate or posterior oropharyngeal erythema.      Neck: Neck supple.   Eyes:      General:         Right eye: No discharge.         Left eye: No discharge.      Conjunctiva/sclera: Conjunctivae normal.   Neck:      Thyroid: No thyromegaly.      Vascular: No carotid bruit.   Cardiovascular:      Rate and Rhythm: Normal rate and regular rhythm.      Pulses: Normal pulses.      Heart sounds: Normal heart sounds. No murmur heard.  Pulmonary:      Breath sounds: Normal breath sounds. No wheezing, rhonchi or rales.   Abdominal:      General: Bowel sounds are normal. There is no distension.      Palpations: Abdomen is soft. There is no mass.      Tenderness: There is no abdominal tenderness.      Hernia: No hernia is present.   Genitourinary:     Prostate: Normal.      Rectum: Normal.   Musculoskeletal:      Right lower leg: No edema.      Left lower leg: No edema.   Lymphadenopathy:      Cervical: No cervical adenopathy.   Skin:     Findings: No lesion or rash.   Neurological:      General: No focal deficit present.      Mental Status: He is alert.      Motor: No abnormal muscle tone.   Psychiatric:         Mood and Affect: Mood normal.         Assessment/Plan:  Essential hypertension, benign  Based on home numbers blood pressure is above goal.  Will increase amlodipine to 10 mg daily.  Continue losartan 50 mg twice daily.  Continue to limit sodium in the diet.  Balanced reduction in total calories in effort to lose 5 to 10 pounds weight which will have a beneficial effect on the BP.  Continue to check home BP and send portal update.    Hyperlipidemia  Recheck lipid panel and calculate 10-year ASCVD risk.  Low-fat high-fiber diet.    Impaired fasting blood sugar  He is aware necessary dietary modifications.  Recheck A1c.    Postablative hypothyroidism  Euthyroid based on TSH in April.  Continue levothyroxine 137 mcg daily.    Chronic  nonintractable headache  Non predictable posterior headaches.  No associated neurologic symptoms.  Better with acetaminophen.  Suspect these may be tension type headaches.  Given his age and duration will obtain a contrast CT of the brain.  Continue abortive treatment with acetaminophen.  Discussed prophylactic treatment such as amitriptyline.  He would like to hold off at this time.    History of colonoscopy  Colonoscopy 1/23/2023-normal study.  No adenomatous polyps.  10-year follow-up.  Dr. [facility]  No acute findings on exam today.  Comprehensive fasting lab ordered.  Screening colonoscopy up-to-date.  He already had influenza vaccine.  Recommend updated COVID booster at local pharmacy.  6-month follow-up for BP recheck.          Orders Placed This Encounter   • CT HEAD WO CONTRAST   • Comprehensive Metabolic Panel   • Glycohemoglobin   • Lipid Panel Without Reflex   • PSA   • amLODIPine (NORVASC) 10 MG tablet       Joby Wen DO    .

## 2023-10-26 ENCOUNTER — ANCILLARY PROCEDURE (OUTPATIENT)
Dept: MAMMOGRAPHY | Facility: CLINIC | Age: 63
End: 2023-10-26
Attending: FAMILY MEDICINE
Payer: COMMERCIAL

## 2023-10-26 DIAGNOSIS — Z12.31 VISIT FOR SCREENING MAMMOGRAM: ICD-10-CM

## 2023-10-26 PROCEDURE — 77067 SCR MAMMO BI INCL CAD: CPT | Mod: TC | Performed by: RADIOLOGY

## 2023-10-26 PROCEDURE — 77063 BREAST TOMOSYNTHESIS BI: CPT | Mod: TC | Performed by: RADIOLOGY

## 2023-11-30 ASSESSMENT — ENCOUNTER SYMPTOMS
PALPITATIONS: 0
FREQUENCY: 0
EYE PAIN: 0
FEVER: 0
HEADACHES: 0
DYSURIA: 0
NERVOUS/ANXIOUS: 0
COUGH: 0
MYALGIAS: 0
JOINT SWELLING: 0
HEMATURIA: 0
HEMATOCHEZIA: 0
SHORTNESS OF BREATH: 0
ARTHRALGIAS: 0
PARESTHESIAS: 0
BREAST MASS: 0
CONSTIPATION: 0
CHILLS: 0
DIARRHEA: 0
SORE THROAT: 0
HEARTBURN: 0
ABDOMINAL PAIN: 0
DIZZINESS: 0
NAUSEA: 0
WEAKNESS: 0

## 2023-12-07 ENCOUNTER — OFFICE VISIT (OUTPATIENT)
Dept: FAMILY MEDICINE | Facility: CLINIC | Age: 63
End: 2023-12-07
Payer: COMMERCIAL

## 2023-12-07 VITALS
DIASTOLIC BLOOD PRESSURE: 72 MMHG | HEIGHT: 63 IN | TEMPERATURE: 97.3 F | WEIGHT: 158 LBS | BODY MASS INDEX: 28 KG/M2 | HEART RATE: 60 BPM | SYSTOLIC BLOOD PRESSURE: 114 MMHG | RESPIRATION RATE: 16 BRPM | OXYGEN SATURATION: 99 %

## 2023-12-07 DIAGNOSIS — E03.4 HYPOTHYROIDISM DUE TO ACQUIRED ATROPHY OF THYROID: ICD-10-CM

## 2023-12-07 DIAGNOSIS — Z00.00 ROUTINE GENERAL MEDICAL EXAMINATION AT A HEALTH CARE FACILITY: Primary | ICD-10-CM

## 2023-12-07 PROCEDURE — 36415 COLL VENOUS BLD VENIPUNCTURE: CPT | Performed by: STUDENT IN AN ORGANIZED HEALTH CARE EDUCATION/TRAINING PROGRAM

## 2023-12-07 PROCEDURE — 99396 PREV VISIT EST AGE 40-64: CPT | Performed by: STUDENT IN AN ORGANIZED HEALTH CARE EDUCATION/TRAINING PROGRAM

## 2023-12-07 PROCEDURE — 99213 OFFICE O/P EST LOW 20 MIN: CPT | Mod: 25 | Performed by: STUDENT IN AN ORGANIZED HEALTH CARE EDUCATION/TRAINING PROGRAM

## 2023-12-07 PROCEDURE — 84443 ASSAY THYROID STIM HORMONE: CPT | Performed by: STUDENT IN AN ORGANIZED HEALTH CARE EDUCATION/TRAINING PROGRAM

## 2023-12-07 PROCEDURE — 80053 COMPREHEN METABOLIC PANEL: CPT | Performed by: STUDENT IN AN ORGANIZED HEALTH CARE EDUCATION/TRAINING PROGRAM

## 2023-12-07 ASSESSMENT — ENCOUNTER SYMPTOMS
BREAST MASS: 0
HEADACHES: 0
COUGH: 0
FEVER: 0
HEMATURIA: 0
JOINT SWELLING: 0
SHORTNESS OF BREATH: 0
HEARTBURN: 0
WEAKNESS: 0
CHILLS: 0
NERVOUS/ANXIOUS: 0
DYSURIA: 0
DIARRHEA: 0
NAUSEA: 0
ABDOMINAL PAIN: 0
MYALGIAS: 0
SORE THROAT: 0
DIZZINESS: 0
CONSTIPATION: 0
HEMATOCHEZIA: 0
ARTHRALGIAS: 0
EYE PAIN: 0
PARESTHESIAS: 0
PALPITATIONS: 0
FREQUENCY: 0

## 2023-12-07 NOTE — PATIENT INSTRUCTIONS
Dave Angiomas    Seborrhoic Keratosis        Preventive Health Recommendations  Female Ages 50 - 64    Yearly exam: See your health care provider every year in order to  Review health changes.   Discuss preventive care.    Review your medicines if your doctor has prescribed any.    Get a Pap test every three years (unless you have an abnormal result and your provider advises testing more often).  If you get Pap tests with HPV test, you only need to test every 5 years, unless you have an abnormal result.   You do not need a Pap test if your uterus was removed (hysterectomy) and you have not had cancer.  You should be tested each year for STDs (sexually transmitted diseases) if you're at risk.   Have a mammogram every 1 to 2 years.  Have a colonoscopy at age 45, or have a yearly FIT test (stool test). These exams screen for colon cancer.    Have a cholesterol test every 5 years, or more often if advised.  Have a diabetes test (fasting glucose) every three years. If you are at risk for diabetes, you should have this test more often.   If you are at risk for osteoporosis (brittle bone disease), think about having a bone density scan (DEXA).    Shots: Get a flu shot each year. Get a tetanus shot every 10 years.    Nutrition:   Eat at least 5 servings of fruits and vegetables each day.  Eat whole-grain bread, whole-wheat pasta and brown rice instead of white grains and rice.  Get adequate Calcium and Vitamin D.     Lifestyle  Exercise at least 150 minutes a week (30 minutes a day, 5 days a week). This will help you control your weight and prevent disease.  Limit alcohol to one drink per day.  No smoking.   Wear sunscreen to prevent skin cancer.   See your dentist every six months for an exam and cleaning.  See your eye doctor every 1 to 2 years.

## 2023-12-07 NOTE — PROGRESS NOTES
SUBJECTIVE:   Carolina is a 63 year old, presenting for the following:  Physical        12/7/2023     3:14 PM   Additional Questions   Roomed by Kathya       Healthy Habits:     Getting at least 3 servings of Calcium per day:  Yes    Bi-annual eye exam:  Yes    Dental care twice a year:  Yes    Sleep apnea or symptoms of sleep apnea:  None    Diet:  Regular (no restrictions), Low salt and Low fat/cholesterol    Frequency of exercise:  4-5 days/week    Duration of exercise:  15-30 minutes    Taking medications regularly:  Yes    Medication side effects:  None    Additional concerns today:  No                  Have you ever done Advance Care Planning? (For example, a Health Directive, POLST, or a discussion with a medical provider or your loved ones about your wishes): No, advance care planning information given to patient to review.  Patient plans to discuss their wishes with loved ones or provider.      Social History     Tobacco Use    Smoking status: Former     Types: Cigarettes     Quit date: 2/15/2020     Years since quitting: 3.8    Smokeless tobacco: Never    Tobacco comments:     1 pack a week   Substance Use Topics    Alcohol use: Not Currently     Comment: occasional wine (twice per year)         11/30/2023    11:21 AM   Alcohol Use   Prescreen: >3 drinks/day or >7 drinks/week? Not Applicable     Reviewed orders with patient.  Reviewed health maintenance and updated orders accordingly - Yes  Lab work is in process  Labs reviewed in EPIC    Breast Cancer Screening:    FHS-7:       10/15/2021     7:19 AM 11/27/2021     7:36 AM 10/17/2022     7:24 AM 11/27/2022    11:35 AM 10/26/2023     7:04 AM 11/30/2023    11:25 AM   Breast CA Risk Assessment (FHS-7)   Did any of your first-degree relatives have breast or ovarian cancer? Yes Yes Yes Yes Yes Yes   Did any of your relatives have bilateral breast cancer? No No No Unknown No Unknown   Did any man in your family have breast cancer? No No No No No No   Did any  woman in your family have breast and ovarian cancer? No Yes No No No No   Did any woman in your family have breast cancer before age 50 y? No No No No No No   Do you have 2 or more relatives with breast and/or ovarian cancer? No Yes Yes Yes Yes Yes   Do you have 2 or more relatives with breast and/or bowel cancer? No Yes No No No No       Mammogram Screening: Recommended annual mammography  Pertinent mammograms are reviewed under the imaging tab.    History of abnormal Pap smear: NO - age 30-65 PAP every 5 years with negative HPV co-testing recommended      Latest Ref Rng & Units 12/2/2022     7:16 AM 6/30/2017     8:39 AM 6/30/2017     8:30 AM   PAP / HPV   PAP  Negative for Intraepithelial Lesion or Malignancy (NILM)      PAP (Historical)   NIL     HPV 16 DNA Negative Negative   Negative    HPV 18 DNA Negative Negative   Negative    Other HR HPV Negative Negative   Negative      Reviewed and updated as needed this visit by clinical staff   Tobacco  Allergies  Meds              Reviewed and updated as needed this visit by Provider                     Review of Systems   Constitutional:  Negative for chills and fever.   HENT:  Negative for congestion, ear pain, hearing loss and sore throat.    Eyes:  Negative for pain and visual disturbance.   Respiratory:  Negative for cough and shortness of breath.    Cardiovascular:  Negative for chest pain, palpitations and peripheral edema.   Gastrointestinal:  Negative for abdominal pain, constipation, diarrhea, heartburn, hematochezia and nausea.   Breasts:  Negative for tenderness, breast mass and discharge.   Genitourinary:  Negative for dysuria, frequency, genital sores, hematuria, pelvic pain, urgency, vaginal bleeding and vaginal discharge.   Musculoskeletal:  Negative for arthralgias, joint swelling and myalgias.   Skin:  Negative for rash.   Neurological:  Negative for dizziness, weakness, headaches and paresthesias.   Psychiatric/Behavioral:  Negative for mood  "changes. The patient is not nervous/anxious.           OBJECTIVE:   /72   Pulse 60   Temp 97.3  F (36.3  C) (Temporal)   Resp 16   Ht 1.607 m (5' 3.25\")   Wt 71.7 kg (158 lb)   SpO2 99%   BMI 27.77 kg/m    Physical Exam  GENERAL: healthy, alert and no distress  EYES: Eyes grossly normal to inspection, PERRL and conjunctivae and sclerae normal  HENT: ear canals and TM's normal, nose and mouth without ulcers or lesions  RESP: lungs clear to auscultation - no rales, rhonchi or wheezes  CV: regular rate and rhythm, normal S1 S2, no S3 or S4, no murmur, click or rub, no peripheral edema and peripheral pulses strong  MS: no gross musculoskeletal defects noted, no edema  SKIN: multiple cherry angiomas and 1 SK lesion on mid back   NEURO: Normal strength and tone, mentation intact and speech normal  PSYCH: mentation appears normal, affect normal/bright    Diagnostic Test Results:  Labs reviewed in Epic  No results found for any visits on 12/07/23.    ASSESSMENT/PLAN:   (Z00.00) Routine general medical examination at a health care facility  (primary encounter diagnosis)  Comment: Stable  Plan: PRIMARY CARE FOLLOW-UP SCHEDULING, REVIEW OF         HEALTH MAINTENANCE PROTOCOL ORDERS,         Comprehensive metabolic panel (BMP + Alb, Alk         Phos, ALT, AST, Total. Bili, TP)            (E03.4) Hypothyroidism due to acquired atrophy of thyroid  Comment: Chronic, stable. Pending labs  Plan: TSH                Patient has been advised of split billing requirements and indicates understanding: Yes      COUNSELING:  Reviewed preventive health counseling, as reflected in patient instructions      BMI:   Estimated body mass index is 27.77 kg/m  as calculated from the following:    Height as of this encounter: 1.607 m (5' 3.25\").    Weight as of this encounter: 71.7 kg (158 lb).   Weight management plan: Discussed healthy diet and exercise guidelines      She reports that she quit smoking about 3 years ago. Her smoking " use included cigarettes. She has never used smokeless tobacco.          HUSSEIN NARVAEZ MD  [facility]

## 2023-12-08 DIAGNOSIS — E03.4 HYPOTHYROIDISM DUE TO ACQUIRED ATROPHY OF THYROID: ICD-10-CM

## 2023-12-08 LAB
ALBUMIN SERPL BCG-MCNC: 4.2 G/DL (ref 3.5–5.2)
ALP SERPL-CCNC: 88 U/L (ref 40–150)
ALT SERPL W P-5'-P-CCNC: 12 U/L (ref 0–50)
ANION GAP SERPL CALCULATED.3IONS-SCNC: 9 MMOL/L (ref 7–15)
AST SERPL W P-5'-P-CCNC: 26 U/L (ref 0–45)
BILIRUB SERPL-MCNC: 0.7 MG/DL
BUN SERPL-MCNC: 13.6 MG/DL (ref 8–23)
CALCIUM SERPL-MCNC: 9.4 MG/DL (ref 8.8–10.2)
CHLORIDE SERPL-SCNC: 105 MMOL/L (ref 98–107)
CREAT SERPL-MCNC: 0.75 MG/DL (ref 0.51–0.95)
DEPRECATED HCO3 PLAS-SCNC: 24 MMOL/L (ref 22–29)
EGFRCR SERPLBLD CKD-EPI 2021: 90 ML/MIN/1.73M2
GLUCOSE SERPL-MCNC: 79 MG/DL (ref 70–99)
POTASSIUM SERPL-SCNC: 4.1 MMOL/L (ref 3.4–5.3)
PROT SERPL-MCNC: 7.7 G/DL (ref 6.4–8.3)
SODIUM SERPL-SCNC: 138 MMOL/L (ref 135–145)
TSH SERPL DL<=0.005 MIU/L-ACNC: 1.17 UIU/ML (ref 0.3–4.2)

## 2023-12-08 RX ORDER — LEVOTHYROXINE SODIUM 100 UG/1
100 TABLET ORAL DAILY
Qty: 90 TABLET | Refills: 3 | Status: SHIPPED | OUTPATIENT
Start: 2023-12-08

## 2023-12-11 ENCOUNTER — IMMUNIZATION (OUTPATIENT)
Dept: FAMILY MEDICINE | Facility: CLINIC | Age: 63
End: 2023-12-11
Payer: COMMERCIAL

## 2023-12-11 DIAGNOSIS — Z23 ENCOUNTER FOR IMMUNIZATION: Primary | ICD-10-CM

## 2023-12-11 PROCEDURE — 91320 SARSCV2 VAC 30MCG TRS-SUC IM: CPT

## 2023-12-11 PROCEDURE — 90678 RSV VACC PREF BIVALENT IM: CPT

## 2023-12-11 PROCEDURE — 90480 ADMN SARSCOV2 VAC 1/ONLY CMP: CPT

## 2023-12-11 PROCEDURE — 90471 IMMUNIZATION ADMIN: CPT

## 2023-12-11 PROCEDURE — 99207 PR NO CHARGE NURSE ONLY: CPT

## 2023-12-11 NOTE — PROGRESS NOTES
Prior to immunization administration, verified patients identity using patient s name and date of birth. Please see Immunization Activity for additional information.     Screening Questionnaire for Adult Immunization    Are you sick today?   No   Do you have allergies to medications, food, a vaccine component or latex?   Yes   Have you ever had a serious reaction after receiving a vaccination?   No   Do you have a long-term health problem with heart, lung, kidney, or metabolic disease (e.g., diabetes), asthma, a blood disorder, no spleen, complement component deficiency, a cochlear implant, or a spinal fluid leak?  Are you on long-term aspirin therapy?   No   Do you have cancer, leukemia, HIV/AIDS, or any other immune system problem?   No   Do you have a parent, brother, or sister with an immune system problem?   No   In the past 3 months, have you taken medications that affect  your immune system, such as prednisone, other steroids, or anticancer drugs; drugs for the treatment of rheumatoid arthritis, Crohn s disease, or psoriasis; or have you had radiation treatments?   No   Have you had a seizure, or a brain or other nervous system problem?   No   During the past year, have you received a transfusion of blood or blood    products, or been given immune (gamma) globulin or antiviral drug?   No   For women: Are you pregnant or is there a chance you could become       pregnant during the next month?   No   Have you received any vaccinations in the past 4 weeks?   No       I have reviewed the following standing orders:   This patient is due and qualifies for the Covid-19 vaccine.     Click here for COVID-19 Standing Order    I have reviewed the vaccines inclusion and exclusion criteria; No concerns regarding eligibility.     This patient is due and qualifies for the RSV vaccine.    Click here for RSV Vaccine Standing Order    I have reviewed the vaccines inclusion and exclusion criteria; No concerns regarding  eligibility.     Patient instructed to remain in clinic for 15 minutes afterwards, and to report any adverse reactions.     Screening performed by Alissa Roth MA on 12/11/2023 at 8:58 AM.

## 2023-12-27 ENCOUNTER — DOCUMENTATION ONLY (OUTPATIENT)
Dept: RHEUMATOLOGY | Facility: CLINIC | Age: 63
End: 2023-12-27
Payer: COMMERCIAL

## 2023-12-27 NOTE — PROGRESS NOTES
"Lm for patient to call back.  Per Dr Fields, \"Please determine if patient still taking methotrexate; if she is not, labs can wait until clinic visit.\"    Will await return call from the patient.    Becky Montes RN    "

## 2023-12-27 NOTE — PROGRESS NOTES
This patient has a future lab only appointment and needs orders. Please send orders. Thanks [facility]

## 2024-01-10 NOTE — PROGRESS NOTES
Rheumatology Clinic Visit  Augustine Fields M.D.     Carolina Mora MRN# [id number]   YOB: 1960 Age: 63 year old     Date of Visit: 01/11/2024    Primary care provider: Michael Beltrán     Assessment:    # Seropositive RA (RF 49/ACPA 205), dx 7/2015. + Hx rheumatoid nodules):  Patient relates continued near-complete freedom from joint pain, stiffness and swelling. Exam shows mild multiple DIP angulation/nodulosis, stable left elbow contracture and no inflammatory changes, no nodules.    Discussion: Rheumatoid arthritis remains in remission.  Inflammatory symptoms have not been present for greater than 4 years despite discontinuation of adalimumab in April 2023, and of methotrexate in August 2023.  I recommend checking inflammatory markers and CBC to ascertain alignment of lab and clinical findings.  We discussed high risk of bony damage over time, should inflammation recur and persist untreated, given the patient's prior seropositive serology profile.  I encourage patient to monitor carefully for symptoms such as joint pain, swelling, stiffness, and early morning stiffness and to alert rheumatology for recurrence of such symptoms.    # High risk medications. Lab work on May 24, 2023 showed creatinine, albumin, transaminases, CRP, and CBC all normal or negative.  High risk medications have been discontinued as of August 2023, and no regular lab work is needed.    # Osteoarthritis, hands:  # Ulcerative colitis: No current symptoms.    Plan:  Remain off Humira and methotrexate, check blood work for inflammation  Report to rheumatology if sustained symptoms joints or gut return for more than a week    Follow-up: 12 months or if inflammatory joint symptoms recur.    Augustine Fields MD  Staff Rheumatologist, [facility]    Orders Placed This Encounter   Procedures    CRP inflammation    Erythrocyte sedimentation rate auto    CBC with platelets differential       On the day of the encounter, a total of 31  "minutes was spent in chart review, and in counseling and coordination of care, regarding the patient's complex medical problem of seropositive rheumatoid arthritis, ulcerative colitis.    HPI: Carolina Mora  is here for follow-up rheumatoid arthritis (RA) +RF 49 CCP >205.  She was last seen in 6-2023. Arthritis was judged in remission at that time, and patient was continued on low-dose methotrexate and Humira, with plans to continue humira every 4 weeks.     Review- +RF 49 CCP >205 -hep b/c 2015 12/2015 -MTB QG; XR hand 8-2016   Past: Pt initially referred to rheumatology 7/2015 for 6m history of polyarticular inflammatory arthritis and hand paresthesias. Found to have +RF/CCP and started on MTX and prednisone with significant improvement. Started on Humira 1/2016 given persistent inflammatory arthritis. Started to titrate down on MTX from 8 to 6 tabs spring 2017 with no flare. Tapered to 7.5 mg/wk in mid-2018. Humira frequency reduced in 2019 every 21 day 6-2019.  Trial of methotrexate/Humira discontinuation in 2023.    Interval history January 11, 2024    Minimal joint pain. No early morning stiffness.   +neck and upper shoulder stiffness, discomfort, only at work with sitting and stress.  She notes mild gelling symptoms after sitting.  Occasional hand aching fleeting, once every few weeks.  Last injection of humira was in 5-23; stopped methotrexate in 8-2023. No change in msk symptoms since stopping.    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 Otherwise no joint swelling, stiffness. No ADL disruption. Sometimes in her feet, she notes \"need to get up and move\"  No early morning stiffness or morning foot pain.  She cites increased work stress recently; she still works 40 hour weeks.    GI symptoms have improved after Ulceris Rx; no further blood in the " stool. Dr. Malone thought that no immune modulating medication needed for Rx of UC if Rx for RA was not needed.    Interval history October 6, 2022    UC has been rediagnosed (untreated for > 10 years) with blood in the stool via colonoscopy per Dr. Malone at [facility]. She started Uceris several weeks ago and bleeding has stopped. She had to modify diet to avoid stomach discomfort/bloating and even rebleeding.    No joint pain/stiffness/swelling since last visit. She has no early morning stiffness. She walks regularly without restriction.    She has continued humira 40 mg every 4 weeks (down from q 3 weeks) and methotrexate weekly with no changes in her symptoms sicne last visit.    Interval history April 14, 2022    No joint pain, no stiffness, no humira dosing cycle sypmtoms. ADLs not disrupted.  No early morning stiffness.  No night pain.  Taking humira q 3 weeks and methotrexate 7.5 mg weekly.       PMH:  Injury-left 4th finger fracture now contracture PIP   Past Medical History:  Dysplasia of cervix (had cryotx) 1990's  Hypothyroidism (07/31/2014) due to atrophy  Menopause  Nephrolithiasis (s/p ESWL) (01/11)  Rheumatoid arthritis   Ulcerative colitis originally dx after 3rd child 2008. treated follow-up  Colonoscopies on follow up showed no scar tissue.     High cholesterol -diet   Smoker    Ventral hernia   DEXA 2017 T-0.7 normal      Past Surgical History:  Colonoscopy (03/14/14)  Cryothearpy, cervical (early 1990's)  HC removal Gallbladder (and repair ventral hernia)(2/01)     Family History:   No psoriasis, UC, crohn's, SLE, RA, PsA, gout, autoimmune thyroid.  No MS, heart disease or in family  Mother- Breast Cancer, Thyroid Disease  Father demential, lung mets brain, cancer, celiac passed   Maternal Grandmother- Diabetes, Hypertension, Cerebrovascular disease , Cancer, shingles   Paternal Grandmother- Hypertension, Breast Cancer  Sister- Thyroid hashmito's disease, Skin cancer basal   Son - asthma childhood,  peanut allergies   No family history of Melanoma  Daughter has Crohn's disease       Social History:   smoked for 30 years - quit spring 2017  EtOH use - none  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   No IVDU. Right handed.      Current Outpatient Medications   Medication Sig    levothyroxine (SYNTHROID/LEVOTHROID) 100 MCG tablet Take 1 tablet (100 mcg) by mouth daily    adalimumab (HUMIRA *CF* PEN) 40 MG/0.4ML pen kit INJECT 1 PEN (40 MG) SUBCUTANEOUSLY EVERY 28 DAYS (HOLD FOR SIGNS OF INFECTION, THEN SEEK MEDICAL ATTENTION. ) (Patient not taking: Reported on 1/11/2024)    folic acid (FOLVITE) 1 MG tablet Take 1 tablet (1 mg) by mouth daily (Patient not taking: Reported on 1/11/2024)    methotrexate 2.5 MG tablet Take 3 tablets (7.5 mg) by mouth every 7 days (Patient not taking: Reported on 1/11/2024)     No current facility-administered medications for this visit.        ROS:  Negative raynaud s phenomena, hairloss, sun sensitivities, keratoconjunctivitis sicca, pleurisy, inflammatory joint symptoms, significant rashes like malar, oral/nasal or ulcerations, inflammatory eye disease, inflammatory bowel disease, dactylitis, tenosynovitis, or enthespathy. Negative for miscarriages over 2 months into pregnancy, blood clots, gout, psoriasis, UC, crohn's. No temporal headache, no jaw claudication, no scalp tenderness, vision changes, carotidynia, cough. No STD or pregnancy symptoms. No Parotid swelling. Denies international travel. Denies history of pneumonia, hepatitis, tuberculosis, shingles, sepsis, tick bites.      CONSTITUTIONAL: No fevers, night sweats or unintentional weight change. No acute distress, swollen glands  EYES: No vision change, diplopia, pain in eyes or red eyes   EARS, NOSE, MOUTH, THROAT: No tinnitus or hearing change, no epistaxis or nasal discharge, no oral lesions, throat clear. Normal saliva pool.  No drymouth. No thyroid enlargement.   CARDIOVASCULAR: No chest pain,  "palpitations, or pain with walking, no orthopnea or PND   RESPIRATORY: No dyspnea, cough, shortness of breath or wheezing. No pleurisy.   GI: No nausea, vomiting, diarrhea or constipation, no abdominal pain, or blood in stools.   : No change in urine, no dysuria or hematuria   MUSCKL: No swollen, tender, red or painful joints. No nodules. No enthesitis, plantar fascitis or heel pain.   INTEGUMENTARY: No concerning lesions or moles   NEURO: No loss of strength or sensation, no numbness or tingling, no tremor, no dizziness, no headache. No falls   ENDO: No polyuria or polydipsia, no temperature intolerance   HEME/LYMPH:No concerning bumps, bleeding problems, or swollen lymph nodes.   ALLERGY: No environmental allergies   PSYCH:No depression or anxiety, no sleep problems.  Otherwise 14 point ROS obtained, reviewed and found negative.     Exam  Blood pressure (!) 149/88, pulse 75, resp. rate 16, height 1.607 m (5' 3.25\"), weight 71.9 kg (158 lb 8 oz), SpO2 97%, not currently breastfeeding.  Wt Readings from Last 4 Encounters:   01/11/24 71.9 kg (158 lb 8 oz)   12/07/23 71.7 kg (158 lb)   06/08/23 71.4 kg (157 lb 8 oz)   12/02/22 72.3 kg (159 lb 6.4 oz)       Constitutional: well-developed, appearing stated age; cooperative  Eyes: nl EOM, PERRLA, vision, conjunctiva, sclera  ENT: nl external ears, nose, hearing, lips, teeth, gums, throat  No mucous membrane lesions, normal saliva pool  Neck: no mass or thyroid enlargement  Resp: breathing unlabored  Lymph: no cervical, supraclavicular, inguinal or epitrochlear nodes  MS:   mild joint laxity of BL wrists   - L hand 4th PIP slight contraction flexure 2/2 prior fracture.   - Laxity of PIP joints without synovitis.  - angulation, firm bony enlargement at > 3 DIPs  - L elbow w/ 5 deg contraction flexure w/o active synovitis (unchanged)  - Bony enlargement at first MTP at both sides  No synovitis    Skin: no nail pitting, alopecia, rash, nodules or lesions  Neuro: nl cranial " nerves, strength, sensation, DTRs.   Psych: nl judgement, orientation, memory, affect.           Latest Ref Rng & Units 1/13/2023     7:03 AM 5/4/2023     7:14 AM 12/7/2023     3:45 PM   RHEUM RESULTS   Albumin 3.4 - 5.0 g/dL 3.8      Albumin 3.5 - 5.2 g/dL  4.1  4.2    ALT 0 - 50 U/L 22  19  12    AST 0 - 45 U/L 15  25  26    Creatinine 0.51 - 0.95 mg/dL 0.78  0.84  0.75    CRP Inflammation 0.0 - 8.0 mg/L <2.9      CRP Inflammation <5.00 mg/L  <3.00     GFR Estimate >60 mL/min/1.73m2 86  79  90    Hematocrit 35.0 - 47.0 % 42.5  41.5     Hemoglobin 11.7 - 15.7 g/dL 14.6  14.1     WBC 4.0 - 11.0 10e3/uL 6.4  5.7     RBC Count 3.80 - 5.20 10e6/uL 4.51  4.48     RDW 10.0 - 15.0 % 13.2  13.8     MCHC 31.5 - 36.5 g/dL 34.4  34.0     MCV 78 - 100 fL 94  93     Platelet Count 150 - 450 10e3/uL 267  258         Rheumatoid Factor   Date Value Ref Range Status   05/28/2015 49 (H) <20 IU/mL Final   ,  ,   Cyclic Cit Pept IgG/IgA   Date Value Ref Range Status   07/06/2015 205 (H) <20 UNITS Final     Comment:     Strongly Positive   ,  ,  ,  ,  ,  ,  ,  ,  ,  ,  ,  ,   Hepatitis B Core April   Date Value Ref Range Status   07/06/2015 Nonreactive NR Final   ,   Hep B Surface Agn   Date Value Ref Range Status   07/06/2015 Nonreactive NR Final   ,  ,  ,  ,  ,  ,  ,  ,  ,  ,  ,  ,  ,  ,  ,  ,  ,  ,  ,  ,  ,  ,   IGA   Date Value Ref Range Status   05/28/2015 222 70 - 380 mg/dL Final   ,  ,  ,  ,  ,  ,  ,

## 2024-01-11 ENCOUNTER — OFFICE VISIT (OUTPATIENT)
Dept: RHEUMATOLOGY | Facility: CLINIC | Age: 64
End: 2024-01-11
Payer: COMMERCIAL

## 2024-01-11 VITALS
DIASTOLIC BLOOD PRESSURE: 88 MMHG | RESPIRATION RATE: 16 BRPM | HEIGHT: 63 IN | BODY MASS INDEX: 28.08 KG/M2 | HEART RATE: 75 BPM | WEIGHT: 158.5 LBS | SYSTOLIC BLOOD PRESSURE: 149 MMHG | OXYGEN SATURATION: 97 %

## 2024-01-11 DIAGNOSIS — M05.79 RHEUMATOID ARTHRITIS INVOLVING MULTIPLE SITES WITH POSITIVE RHEUMATOID FACTOR (H): Primary | ICD-10-CM

## 2024-01-11 PROCEDURE — 99214 OFFICE O/P EST MOD 30 MIN: CPT | Performed by: INTERNAL MEDICINE

## 2024-01-11 ASSESSMENT — PAIN SCALES - GENERAL: PAINLEVEL: NO PAIN (0)

## 2024-01-11 NOTE — NURSING NOTE
"Chief Complaint   Patient presents with    RECHECK     Rheumatoid arthritis involving multiple sites with positive rheumatoid factor (H)       Vitals:    01/11/24 1218   BP: (!) 149/88   BP Location: Left arm   Patient Position: Sitting   Cuff Size: Adult Regular   Pulse: 75   Resp: 16   SpO2: 97%   Weight: 71.9 kg (158 lb 8 oz)   Height: 1.607 m (5' 3.25\")       Body mass index is 27.86 kg/m .    Amara Colby, ZAINF  "

## 2024-01-11 NOTE — PATIENT INSTRUCTIONS
Diagnosis:  1.  Rheumatoid arthritis: Symptoms and physical examination today suggest complete remission. I recommend checking CRP, ESR, and CBC  2.  Osteoarthritis, hands, early, mild, stable  3. Ulcerative colitis: no recurrence of bleeding or GI symptoms.    Plan:  Remain off Humira and methotrexate, check blood work for inflammation  Report to rheumatology if sustained symptoms joints or gut return for more than a week

## 2024-01-15 ENCOUNTER — LAB (OUTPATIENT)
Dept: LAB | Facility: CLINIC | Age: 64
End: 2024-01-15
Payer: COMMERCIAL

## 2024-01-15 DIAGNOSIS — M05.79 RHEUMATOID ARTHRITIS INVOLVING MULTIPLE SITES WITH POSITIVE RHEUMATOID FACTOR (H): ICD-10-CM

## 2024-01-15 LAB
BASOPHILS # BLD AUTO: 0.1 10E3/UL (ref 0–0.2)
BASOPHILS NFR BLD AUTO: 1 %
CRP SERPL-MCNC: <3 MG/L
EOSINOPHIL # BLD AUTO: 0.2 10E3/UL (ref 0–0.7)
EOSINOPHIL NFR BLD AUTO: 3 %
ERYTHROCYTE [DISTWIDTH] IN BLOOD BY AUTOMATED COUNT: 12.3 % (ref 10–15)
ERYTHROCYTE [SEDIMENTATION RATE] IN BLOOD BY WESTERGREN METHOD: 9 MM/HR (ref 0–30)
HCT VFR BLD AUTO: 42.7 % (ref 35–47)
HGB BLD-MCNC: 14.1 G/DL (ref 11.7–15.7)
IMM GRANULOCYTES # BLD: 0 10E3/UL
IMM GRANULOCYTES NFR BLD: 0 %
LYMPHOCYTES # BLD AUTO: 1.5 10E3/UL (ref 0.8–5.3)
LYMPHOCYTES NFR BLD AUTO: 29 %
MCH RBC QN AUTO: 30.3 PG (ref 26.5–33)
MCHC RBC AUTO-ENTMCNC: 33 G/DL (ref 31.5–36.5)
MCV RBC AUTO: 92 FL (ref 78–100)
MONOCYTES # BLD AUTO: 0.5 10E3/UL (ref 0–1.3)
MONOCYTES NFR BLD AUTO: 11 %
NEUTROPHILS # BLD AUTO: 2.8 10E3/UL (ref 1.6–8.3)
NEUTROPHILS NFR BLD AUTO: 56 %
PLATELET # BLD AUTO: 237 10E3/UL (ref 150–450)
RBC # BLD AUTO: 4.66 10E6/UL (ref 3.8–5.2)
WBC # BLD AUTO: 5.1 10E3/UL (ref 4–11)

## 2024-01-15 PROCEDURE — 85652 RBC SED RATE AUTOMATED: CPT

## 2024-01-15 PROCEDURE — 85025 COMPLETE CBC W/AUTO DIFF WBC: CPT

## 2024-01-15 PROCEDURE — 86140 C-REACTIVE PROTEIN: CPT

## 2024-01-15 PROCEDURE — 36415 COLL VENOUS BLD VENIPUNCTURE: CPT

## 2024-01-23 NOTE — TELEPHONE ENCOUNTER
Julieth Angel was admitted to ICU room 234 from ED via cart accompanied by RN.   Reason for hospitalization is upper GI bleed.   Upon arrival, patient is stable. Patient has history significant for   Past Medical History:   Diagnosis Date    ARTHRITIS PSORIATIC ARTHROPATHY     hands, wrists, spine    Chronic obstructive pulmonary disease (COPD) (CMD) 5/25/2018    Emphysema     Essential hypertension, benign     Impaired glucose tolerance 9/23/2014    Morbid obesity (CMD)     Myopia with presbyopia 8/29/2013    Osteopenia 10/12/2018    Shingles rash 9/10    right breast    Unspecified glaucoma(365.9) 9/7/2011    Unspecified sleep apnea     has not been tested    Vitamin D deficiency 9/22/2014     .  Patient oriented to bed, call light, room, and unit.  Patient provided with the following educational materials upon admission:safety, infection control, and pain.   Level of understanding patient verbalized understanding.   Admission orders received at this time.   RN notified of patient arrival.   See Epic documentation for patient individualized nursing care plan.   Last Clinic Visit: 12/28/2018  [facility]

## 2024-05-01 ENCOUNTER — TRANSFERRED RECORDS (OUTPATIENT)
Dept: HEALTH INFORMATION MANAGEMENT | Facility: CLINIC | Age: 64
End: 2024-05-01
Payer: COMMERCIAL

## 2024-05-02 ENCOUNTER — TELEPHONE (OUTPATIENT)
Dept: RHEUMATOLOGY | Facility: CLINIC | Age: 64
End: 2024-05-02
Payer: COMMERCIAL

## 2024-05-02 NOTE — TELEPHONE ENCOUNTER
sent pt [facility] to reschedule the Dr Fields appt. There is a hold for this pt on 1/9/25 at 9:00.

## 2024-05-23 ENCOUNTER — OFFICE VISIT (OUTPATIENT)
Dept: FAMILY MEDICINE | Facility: CLINIC | Age: 64
End: 2024-05-23
Payer: COMMERCIAL

## 2024-05-23 VITALS
TEMPERATURE: 99.2 F | BODY MASS INDEX: 27.77 KG/M2 | DIASTOLIC BLOOD PRESSURE: 85 MMHG | WEIGHT: 158 LBS | SYSTOLIC BLOOD PRESSURE: 138 MMHG | HEART RATE: 69 BPM | OXYGEN SATURATION: 98 % | RESPIRATION RATE: 16 BRPM

## 2024-05-23 DIAGNOSIS — K51.30 ULCERATIVE RECTOSIGMOIDITIS WITHOUT COMPLICATION (H): ICD-10-CM

## 2024-05-23 DIAGNOSIS — M05.79 RHEUMATOID ARTHRITIS INVOLVING MULTIPLE SITES WITH POSITIVE RHEUMATOID FACTOR (H): ICD-10-CM

## 2024-05-23 DIAGNOSIS — R29.898 WEAKNESS OF BOTH LOWER EXTREMITIES: Primary | ICD-10-CM

## 2024-05-23 DIAGNOSIS — D84.9 IMMUNOCOMPROMISED (H): ICD-10-CM

## 2024-05-23 DIAGNOSIS — E03.4 HYPOTHYROIDISM DUE TO ACQUIRED ATROPHY OF THYROID: ICD-10-CM

## 2024-05-23 LAB
ERYTHROCYTE [SEDIMENTATION RATE] IN BLOOD BY WESTERGREN METHOD: 8 MM/HR (ref 0–30)
HBA1C MFR BLD: 5.2 % (ref 0–5.6)

## 2024-05-23 PROCEDURE — 36415 COLL VENOUS BLD VENIPUNCTURE: CPT | Performed by: STUDENT IN AN ORGANIZED HEALTH CARE EDUCATION/TRAINING PROGRAM

## 2024-05-23 PROCEDURE — G2211 COMPLEX E/M VISIT ADD ON: HCPCS | Performed by: STUDENT IN AN ORGANIZED HEALTH CARE EDUCATION/TRAINING PROGRAM

## 2024-05-23 PROCEDURE — 85652 RBC SED RATE AUTOMATED: CPT | Performed by: STUDENT IN AN ORGANIZED HEALTH CARE EDUCATION/TRAINING PROGRAM

## 2024-05-23 PROCEDURE — 86140 C-REACTIVE PROTEIN: CPT | Performed by: STUDENT IN AN ORGANIZED HEALTH CARE EDUCATION/TRAINING PROGRAM

## 2024-05-23 PROCEDURE — 83036 HEMOGLOBIN GLYCOSYLATED A1C: CPT | Performed by: STUDENT IN AN ORGANIZED HEALTH CARE EDUCATION/TRAINING PROGRAM

## 2024-05-23 PROCEDURE — 99214 OFFICE O/P EST MOD 30 MIN: CPT | Performed by: STUDENT IN AN ORGANIZED HEALTH CARE EDUCATION/TRAINING PROGRAM

## 2024-05-23 PROCEDURE — 84443 ASSAY THYROID STIM HORMONE: CPT | Performed by: STUDENT IN AN ORGANIZED HEALTH CARE EDUCATION/TRAINING PROGRAM

## 2024-05-23 ASSESSMENT — ENCOUNTER SYMPTOMS
SHORTNESS OF BREATH: 1
FATIGUE: 1
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
## 2024-05-24 LAB
CRP SERPL-MCNC: <3 MG/L
TSH SERPL DL<=0.005 MIU/L-ACNC: 0.44 UIU/ML (ref 0.3–4.2)

## 2024-08-14 ENCOUNTER — OFFICE VISIT (OUTPATIENT)
Dept: NEUROLOGY | Facility: CLINIC | Age: 64
End: 2024-08-14
Attending: STUDENT IN AN ORGANIZED HEALTH CARE EDUCATION/TRAINING PROGRAM
Payer: COMMERCIAL

## 2024-08-14 DIAGNOSIS — R29.898 WEAKNESS OF BOTH LOWER EXTREMITIES: ICD-10-CM

## 2024-08-14 PROCEDURE — 95886 MUSC TEST DONE W/N TEST COMP: CPT | Performed by: PSYCHIATRY & NEUROLOGY

## 2024-08-14 PROCEDURE — 95910 NRV CNDJ TEST 7-8 STUDIES: CPT | Performed by: PSYCHIATRY & NEUROLOGY

## 2024-08-14 NOTE — PROGRESS NOTES
[facility]  Electrodiagnostic Laboratory                 Department of Neurology                                                                                                         Test Date:  2024    Patient: Carolina Mora : 1960 Physician: Michael Kennedy MD   Sex: Female AGE: 64 year Ref Phys: Marguerite Hall MD   ID#: [id number]   Technician: Devon Staley     History and Examination:  Carolina Mora is a 64 year old woman with rheumatoid arthritis and a symptom of generalized weakness in the lower limbs. She is referred for further evaluation of this symptom. In the past week she has also developed a modest ache in the low back, radiating to buttocks and thighs.    Techniques:  Motor conduction studies were done with surface recording electrodes. Sensory conduction studies were performed with surface electrodes, unless indicated otherwise by (n), designating the use of subdermal recording electrodes. Temperature was monitored and recorded throughout the study. EMG was done with a concentric needle electrode.     Results:  All nerve conduction studies (as indicated in the following tables) were within normal limits.  All left vs. right side differences were within normal limits.  Electromyography of the left lower limb was normal.     Interpretation:  This is a normal study. In particular, there is no electrodiagnostic evidence of polyneuropathy, myopathy, or radiculopathy.     Comment:  If a subacute polyradiculopathy is strongly suspected clinically, followup electromyography beyond 3 weeks after symptom onset may have higher yield for fibrillation potentials, as these are generally not noted less than 2 weeks after onset.       _____________________________  Michael Kennedy MD  Board Certified in Clinical Neurophysiology and Neuromuscular Medicine        Nerve Conduction Studies  Motor Sites      Latency Amplitude Neg. Amp Diff Segment Distance Velocity Neg.  Dur Neg Area Diff Temperature Comment   Site (ms) Norm (mV) Norm (%)  cm m/s Norm (ms) (%) ( C)    Left Fibular (EDB) Motor   Ankle 3.5  < 6.0 3.4  > 2.0  Ankle-EDB 8   4.1  30.8    Right Fibular (EDB) Motor   Ankle 3.5  < 6.0 3.1  > 2.0  Ankle-EDB 8   4.0  31.4    Bel Fib Head 9.4 - 2.6 - -16 Bel Fib Head-Ankle 29 49  > 38 4.2 -9 31.4    Pop Fossa 11.0 - 2.5 - -4 Pop Fossa-Bel Fib Head 8 50  > 38 4.4 0 31.3    Left Tibial (AHB) Motor   Ankle 3.7  < 6.5 13.7  > 5.0  Ankle-AHB 8   4.9  31    Right Tibial (AHB) Motor   Ankle 3.6  < 6.5 15.0  > 5.0  Ankle-AHB 8   4.8  31.1    Knee 11.1 - 8.6 - -43 Knee-Ankle 36 48  > 38 5.8 -40 31      F-Wave Sites      Min F-Lat Max-Min F-Lat Mean F-Lat   Site (ms) (ms) (ms)   Left Tibial F-Wave   Ankle 47.7 7.8 -   Right Tibial F-Wave   Ankle 45.0 6.4 -     Sensory Sites      Onset Lat Peak Lat Neg Peak - Start Amp Amp (P-P) Segment Distance Velocity Temperature Comment   Site ms (ms) ( V) Norm ( V)  cm m/s Norm ( C)    Left Superficial Fibular Sensory   Lower Leg-Ankle 2.4 3.0 7  > 3 6 Lower Leg-Ankle 12.5 52  > 38 30.6    Right Superficial Fibular Sensory   Lower Leg-Ankle 2.6 3.3 8  > 3 7 Lower Leg-Ankle 12.5 48  > 38 31.2    Left Sural Sensory   Calf-Lat Mall 3.0 3.7 10  > 5 11 Calf-Lat Mall 14 47  > 38 30.6    Right Sural Sensory   Calf-Lat Mall 3.0 3.9 14  > 5 12 Calf-Lat Mall 14 47  > 38 31.2        Electromyography     Side Muscle Ins Act Fibs/PSW Fasc HF Amp Dur Poly Recrt Int Pat   Right Tib Anterior Nml None Nml 0 Nml Nml 0 Nml Nml   Right Gastroc MH Nml None Nml 0 Nml Nml 0 Nml Nml   Right Vastus Lat Nml None Nml 0 Nml Nml 0 Nml Nml   Right Biceps Fem SH Nml None Nml 0 Nml Nml 0 Nml Nml   Right Gluteus Med Nml None Nml 0 Nml Nml 0 Nml Nml   Right L5 Parasp Nml None Nml 0              NCS Waveforms:    Motor                Sensory                F-Wave

## 2024-08-14 NOTE — LETTER
2024       RE: Carolina Mora  [address]  [facility] [address]     Dear Colleague,    Thank you for referring your patient, Carolina Mora, to the [facility] [place] at [facility]. Please see a copy of my visit note below.                        [facility]  Electrodiagnostic Laboratory                 Department of Neurology                                                                                                         Test Date:  2024    Patient: Carolina Mora : 1960 Physician: Michael Kennedy MD   Sex: Female AGE: 64 year Ref Phys: Marguerite Hall MD   ID#: [id number]   Technician: Devon Staley     History and Examination:  Carolina Mora is a 64 year old woman with rheumatoid arthritis and a symptom of generalized weakness in the lower limbs. She is referred for further evaluation of this symptom. In the past week she has also developed a modest ache in the low back, radiating to buttocks and thighs.    Techniques:  Motor conduction studies were done with surface recording electrodes. Sensory conduction studies were performed with surface electrodes, unless indicated otherwise by (n), designating the use of subdermal recording electrodes. Temperature was monitored and recorded throughout the study. EMG was done with a concentric needle electrode.     Results:  All nerve conduction studies (as indicated in the following tables) were within normal limits.  All left vs. right side differences were within normal limits.  Electromyography of the left lower limb was normal.     Interpretation:  This is a normal study. In particular, there is no electrodiagnostic evidence of polyneuropathy, myopathy, or radiculopathy.     Comment:  If a subacute polyradiculopathy is strongly suspected clinically, followup electromyography beyond 3 weeks after symptom onset may have higher yield for fibrillation  potentials, as these are generally not noted less than 2 weeks after onset.       _____________________________  Michael Kennedy MD  Board Certified in Clinical Neurophysiology and Neuromuscular Medicine        Nerve Conduction Studies  Motor Sites      Latency Amplitude Neg. Amp Diff Segment Distance Velocity Neg. Dur Neg Area Diff Temperature Comment   Site (ms) Norm (mV) Norm (%)  cm m/s Norm (ms) (%) ( C)    Left Fibular (EDB) Motor   Ankle 3.5  < 6.0 3.4  > 2.0  Ankle-EDB 8   4.1  30.8    Right Fibular (EDB) Motor   Ankle 3.5  < 6.0 3.1  > 2.0  Ankle-EDB 8   4.0  31.4    Bel Fib Head 9.4 - 2.6 - -16 Bel Fib Head-Ankle 29 49  > 38 4.2 -9 31.4    Pop Fossa 11.0 - 2.5 - -4 Pop Fossa-Bel Fib Head 8 50  > 38 4.4 0 31.3    Left Tibial (AHB) Motor   Ankle 3.7  < 6.5 13.7  > 5.0  Ankle-AHB 8   4.9  31    Right Tibial (AHB) Motor   Ankle 3.6  < 6.5 15.0  > 5.0  Ankle-AHB 8   4.8  31.1    Knee 11.1 - 8.6 - -43 Knee-Ankle 36 48  > 38 5.8 -40 31      F-Wave Sites      Min F-Lat Max-Min F-Lat Mean F-Lat   Site (ms) (ms) (ms)   Left Tibial F-Wave   Ankle 47.7 7.8 -   Right Tibial F-Wave   Ankle 45.0 6.4 -     Sensory Sites      Onset Lat Peak Lat Neg Peak - Start Amp Amp (P-P) Segment Distance Velocity Temperature Comment   Site ms (ms) ( V) Norm ( V)  cm m/s Norm ( C)    Left Superficial Fibular Sensory   Lower Leg-Ankle 2.4 3.0 7  > 3 6 Lower Leg-Ankle 12.5 52  > 38 30.6    Right Superficial Fibular Sensory   Lower Leg-Ankle 2.6 3.3 8  > 3 7 Lower Leg-Ankle 12.5 48  > 38 31.2    Left Sural Sensory   Calf-Lat Mall 3.0 3.7 10  > 5 11 Calf-Lat Mall 14 47  > 38 30.6    Right Sural Sensory   Calf-Lat Mall 3.0 3.9 14  > 5 12 Calf-Lat Mall 14 47  > 38 31.2        Electromyography     Side Muscle Ins Act Fibs/PSW Fasc HF Amp Dur Poly Recrt Int Pat   Right Tib Anterior Nml None Nml 0 Nml Nml 0 Nml Nml   Right Gastroc MH Nml None Nml 0 Nml Nml 0 Nml Nml   Right Vastus Lat Nml None Nml 0 Nml Nml 0 Nml Nml   Right Biceps Fem SH Nml None  Nml 0 Nml Nml 0 Nml Nml   Right Gluteus Med Nml None Nml 0 Nml Nml 0 Nml Nml   Right L5 Parasp Nml None Nml 0              NCS Waveforms:    Motor                Sensory                F-Wave                     Again, thank you for allowing me to participate in the care of your patient.      Sincerely,    Michael Kennedy MD

## 2024-10-28 ENCOUNTER — ANCILLARY PROCEDURE (OUTPATIENT)
Dept: MAMMOGRAPHY | Facility: CLINIC | Age: 64
End: 2024-10-28
Attending: FAMILY MEDICINE
Payer: COMMERCIAL

## 2024-10-28 DIAGNOSIS — Z12.31 VISIT FOR SCREENING MAMMOGRAM: ICD-10-CM

## 2024-10-28 PROCEDURE — 77067 SCR MAMMO BI INCL CAD: CPT | Mod: TC | Performed by: RADIOLOGY

## 2024-10-28 PROCEDURE — 77063 BREAST TOMOSYNTHESIS BI: CPT | Mod: TC | Performed by: RADIOLOGY

## 2024-12-07 SDOH — HEALTH STABILITY: PHYSICAL HEALTH: ON AVERAGE, HOW MANY MINUTES DO YOU ENGAGE IN EXERCISE AT THIS LEVEL?: 40 MIN

## 2024-12-07 SDOH — HEALTH STABILITY: PHYSICAL HEALTH: ON AVERAGE, HOW MANY DAYS PER WEEK DO YOU ENGAGE IN MODERATE TO STRENUOUS EXERCISE (LIKE A BRISK WALK)?: 5 DAYS

## 2024-12-07 ASSESSMENT — SOCIAL DETERMINANTS OF HEALTH (SDOH): HOW OFTEN DO YOU GET TOGETHER WITH FRIENDS OR RELATIVES?: THREE TIMES A WEEK

## 2024-12-12 ENCOUNTER — OFFICE VISIT (OUTPATIENT)
Dept: FAMILY MEDICINE | Facility: CLINIC | Age: 64
End: 2024-12-12
Payer: COMMERCIAL

## 2024-12-12 VITALS
BODY MASS INDEX: 27.83 KG/M2 | OXYGEN SATURATION: 96 % | TEMPERATURE: 97.5 F | HEIGHT: 64 IN | WEIGHT: 163 LBS | DIASTOLIC BLOOD PRESSURE: 91 MMHG | SYSTOLIC BLOOD PRESSURE: 129 MMHG | RESPIRATION RATE: 16 BRPM | HEART RATE: 69 BPM

## 2024-12-12 DIAGNOSIS — E03.4 HYPOTHYROIDISM DUE TO ACQUIRED ATROPHY OF THYROID: ICD-10-CM

## 2024-12-12 DIAGNOSIS — R42 VERTIGO: ICD-10-CM

## 2024-12-12 DIAGNOSIS — E78.5 HYPERLIPIDEMIA WITH TARGET LDL LESS THAN 130: ICD-10-CM

## 2024-12-12 DIAGNOSIS — Z00.00 ROUTINE GENERAL MEDICAL EXAMINATION AT A HEALTH CARE FACILITY: Primary | ICD-10-CM

## 2024-12-12 DIAGNOSIS — R06.2 WHEEZING: ICD-10-CM

## 2024-12-12 DIAGNOSIS — Z13.6 CARDIOVASCULAR SCREENING; LDL GOAL LESS THAN 100: ICD-10-CM

## 2024-12-12 LAB
CHOLEST SERPL-MCNC: 265 MG/DL
FASTING STATUS PATIENT QL REPORTED: YES
HDLC SERPL-MCNC: 52 MG/DL
LDLC SERPL CALC-MCNC: 187 MG/DL
NONHDLC SERPL-MCNC: 213 MG/DL
TRIGL SERPL-MCNC: 128 MG/DL

## 2024-12-12 RX ORDER — LEVOTHYROXINE SODIUM 100 UG/1
100 TABLET ORAL DAILY
Qty: 90 TABLET | Refills: 3 | Status: SHIPPED | OUTPATIENT
Start: 2024-12-12

## 2024-12-12 RX ORDER — ALBUTEROL SULFATE 90 UG/1
2 INHALANT RESPIRATORY (INHALATION) EVERY 6 HOURS PRN
Qty: 18 G | Refills: 1 | Status: SHIPPED | OUTPATIENT
Start: 2024-12-12

## 2024-12-12 RX ORDER — MECLIZINE HYDROCHLORIDE 25 MG/1
12.5 TABLET ORAL 3 TIMES DAILY PRN
Qty: 90 TABLET | Refills: 1 | Status: SHIPPED | OUTPATIENT
Start: 2024-12-12

## 2024-12-12 NOTE — PATIENT INSTRUCTIONS
Patient Education   Preventive Care Advice   This is general advice given by our system to help you stay healthy. However, your care team may have specific advice just for you. Please talk to your care team about your preventive care needs.  Nutrition  Eat 5 or more servings of fruits and vegetables each day.  Try wheat bread, brown rice and whole grain pasta (instead of white bread, rice, and pasta).  Get enough calcium and vitamin D. Check the label on foods and aim for 100% of the RDA (recommended daily allowance).  Lifestyle  Exercise at least 150 minutes each week  (30 minutes a day, 5 days a week).  Do muscle strengthening activities 2 days a week. These help control your weight and prevent disease.  No smoking.  Wear sunscreen to prevent skin cancer.  Have a dental exam and cleaning every 6 months.  Yearly exams  See your health care team every year to talk about:  Any changes in your health.  Any medicines your care team has prescribed.  Preventive care, family planning, and ways to prevent chronic diseases.  Shots (vaccines)   HPV shots (up to age 26), if you've never had them before.  Hepatitis B shots (up to age 59), if you've never had them before.  COVID-19 shot: Get this shot when it's due.  Flu shot: Get a flu shot every year.  Tetanus shot: Get a tetanus shot every 10 years.  Pneumococcal, hepatitis A, and RSV shots: Ask your care team if you need these based on your risk.  Shingles shot (for age 50 and up)  General health tests  Diabetes screening:  Starting at age 35, Get screened for diabetes at least every 3 years.  If you are younger than age 35, ask your care team if you should be screened for diabetes.  Cholesterol test: At age 39, start having a cholesterol test every 5 years, or more often if advised.  Bone density scan (DEXA): At age 50, ask your care team if you should have this scan for osteoporosis (brittle bones).  Hepatitis C: Get tested at least once in your life.  STIs (sexually  transmitted infections)  Before age 24: Ask your care team if you should be screened for STIs.  After age 24: Get screened for STIs if you're at risk. You are at risk for STIs (including HIV) if:  You are sexually active with more than one person.  You don't use condoms every time.  You or a partner was diagnosed with a sexually transmitted infection.  If you are at risk for HIV, ask about PrEP medicine to prevent HIV.  Get tested for HIV at least once in your life, whether you are at risk for HIV or not.  Cancer screening tests  Cervical cancer screening: If you have a cervix, begin getting regular cervical cancer screening tests starting at age 21.  Breast cancer scan (mammogram): If you've ever had breasts, begin having regular mammograms starting at age 40. This is a scan to check for breast cancer.  Colon cancer screening: It is important to start screening for colon cancer at age 45.  Have a colonoscopy test every 10 years (or more often if you're at risk) Or, ask your provider about stool tests like a FIT test every year or Cologuard test every 3 years.  To learn more about your testing options, visit:   .  For help making a decision, visit:   https://bit.ly/qh26788.  Prostate cancer screening test: If you have a prostate, ask your care team if a prostate cancer screening test (PSA) at age 55 is right for you.  Lung cancer screening: If you are a current or former smoker ages 50 to 80, ask your care team if ongoing lung cancer screenings are right for you.  For informational purposes only. Not to replace the advice of your health care provider. Copyright   2023 [employer]. All rights reserved. Clinically reviewed by the [facility]. BAR[employer] [id number] - REV 01/24.

## 2024-12-12 NOTE — PROGRESS NOTES
Preventive Care Visit  [facility]  HUSSEIN TAVERA MD, Family Medicine  Dec 12, 2024      Assessment & Plan     (R42) Vertigo  (primary encounter diagnosis)  Comment: Chronic, stable. Refills sent  Plan: meclizine (ANTIVERT) 25 MG tablet, REVIEW OF         HEALTH MAINTENANCE PROTOCOL ORDERS            (E03.4) Hypothyroidism due to acquired atrophy of thyroid  Comment: Chronic, stable. Refills sent  Plan: levothyroxine (SYNTHROID/LEVOTHROID) 100 MCG         tablet, REVIEW OF HEALTH MAINTENANCE PROTOCOL         ORDERS            (R06.2) Wheezing  Comment: Chronic, stable. Refills sent  Plan: albuterol (PROAIR HFA/PROVENTIL HFA/VENTOLIN         HFA) 108 (90 Base) MCG/ACT inhaler, REVIEW OF         HEALTH MAINTENANCE PROTOCOL ORDERS            (Z00.00) Routine general medical examination at a health care facility  Comment: Stable  Plan: REVIEW OF HEALTH MAINTENANCE PROTOCOL ORDERS           (E78.5) Hyperlipidemia with target LDL less than 130  Comment: Chronic, stable  Plan: Lipid panel reflex to direct LDL Non-fasting,         REVIEW OF HEALTH MAINTENANCE PROTOCOL ORDERS            (Z13.6) CARDIOVASCULAR SCREENING; LDL GOAL LESS THAN 100  Comment: Chronic, stable  Plan: REVIEW OF HEALTH MAINTENANCE PROTOCOL ORDERS                        Counseling  Appropriate preventive services were addressed with this patient via screening, questionnaire, or discussion as appropriate for fall prevention, nutrition, physical activity, Tobacco-use cessation, social engagement, weight loss and cognition.  Checklist reviewing preventive services available has been given to the patient.  Reviewed patient's diet, addressing concerns and/or questions.           Mark Figueroa is a 64 year old, presenting for the following:  Physical           HPI  Pt is a 64 year old female presenting for a routine physical. She reports that her right knee has been giving her trouble and she will be following up sports  medicine next week    She does endorse being in the emergency room due to vertigo and given meclizine. She denies having further episodes but would like a refill of her  medication    During this time of the year she does endorse chest congestion that is relieved with use of an inhaler. She is requesting a refill         Health Care Directive  Patient does not have a Health Care Directive: Discussed advance care planning with patient; information given to patient to review.      12/7/2024   General Health   How would you rate your overall physical health? Good   Feel stress (tense, anxious, or unable to sleep) Only a little      (!) STRESS CONCERN      12/7/2024   Nutrition   Three or more servings of calcium each day? Yes   Diet: Regular (no restrictions)   How many servings of fruit and vegetables per day? (!) 2-3   How many sweetened beverages each day? 0-1            12/7/2024   Exercise   Days per week of moderate/strenous exercise 5 days   Average minutes spent exercising at this level 40 min            12/7/2024   Social Factors   Frequency of gathering with friends or relatives Three times a week   Worry food won't last until get money to buy more No   Food not last or not have enough money for food? No   Do you have housing? (Housing is defined as stable permanent housing and does not include staying ouside in a car, in a tent, in an abandoned building, in an overnight shelter, or couch-surfing.) Yes   Are you worried about losing your housing? No   Lack of transportation? No   Unable to get utilities (heat,electricity)? No            12/7/2024   Fall Risk   Fallen 2 or more times in the past year? No    Trouble with walking or balance? No        Patient-reported          12/7/2024   Dental   Dentist two times every year? Yes                 Today's PHQ-2 Score:       1/11/2024    12:20 PM   PHQ-2 ( 1999 [employer])   Q1: Little interest or pleasure in doing things 0   Q2: Feeling down, depressed or hopeless 0    PHQ-2 Score 0         2024   Substance Use   Alcohol more than 3/day or more than 7/wk Not Applicable   Do you use any other substances recreationally? No        Social History     Tobacco Use    Smoking status: Former     Current packs/day: 0.00     Types: Cigarettes     Quit date: 2/15/2020     Years since quittin.8     Passive exposure: Never    Smokeless tobacco: Never    Tobacco comments:     1 pack a week   Vaping Use    Vaping status: Never Used   Substance Use Topics    Alcohol use: Not Currently     Comment: occasional wine (twice per year)    Drug use: No           10/28/2024   LAST FHS-7 RESULTS   1st degree relative breast or ovarian cancer Yes   Any relative bilateral breast cancer No   Any male have breast cancer No   Any ONE woman have BOTH breast AND ovarian cancer No   Any woman with breast cancer before 50yrs No   2 or more relatives with breast AND/OR ovarian cancer No   2 or more relatives with breast AND/OR bowel cancer No           Mammogram Screening - Mammogram every 1-2 years updated in Health Maintenance based on mutual decision making        2024   STI Screening   New sexual partner(s) since last STI/HIV test? No        History of abnormal Pap smear: No - age 30- 64 PAP with HPV every 5 years recommended        Latest Ref Rng & Units 2022     7:16 AM 2017     8:39 AM 2017     8:30 AM   PAP / HPV   PAP  Negative for Intraepithelial Lesion or Malignancy (NILM)      PAP (Historical)   NIL     HPV 16 DNA Negative Negative   Negative    HPV 18 DNA Negative Negative   Negative    Other HR HPV Negative Negative   Negative      ASCVD Risk   The 10-year ASCVD risk score (Aamir SNOW, et al., 2019) is: 4.5%    Values used to calculate the score:      Age: 64 years      Sex: Female      Is Non- : No      Diabetic: No      Tobacco smoker: No      Systolic Blood Pressure: 129 mmHg      Is BP treated: No      HDL Cholesterol: 51 mg/dL       "Total Cholesterol: 216 mg/dL           Reviewed and updated as needed this visit by Provider                         ROS: 10 point ROS neg other than the symptoms noted above in the HPI.       Objective    Exam  BP (!) 129/91   Pulse 69   Temp 97.5  F (36.4  C) (Temporal)   Resp 16   Ht 1.613 m (5' 3.5\")   Wt 73.9 kg (163 lb)   SpO2 96%   BMI 28.42 kg/m     Estimated body mass index is 28.42 kg/m  as calculated from the following:    Height as of this encounter: 1.613 m (5' 3.5\").    Weight as of this encounter: 73.9 kg (163 lb).    Physical Exam  GENERAL: alert and no distress  EYES: Eyes grossly normal to inspection, PERRL and conjunctivae and sclerae normal  RESP: lungs clear to auscultation - no rales, rhonchi or wheezes  CV: regular rate and rhythm, normal S1 S2, no S3 or S4, no murmur, click or rub, no peripheral edema  MS: no gross musculoskeletal defects noted, no edema  SKIN: no suspicious lesions or rashes  NEURO: Normal strength and tone, mentation intact and speech normal  PSYCH: mentation appears normal, affect normal/bright        Signed Electronically by: HUSSEIN TAVERA MD    "

## 2025-01-08 NOTE — PROGRESS NOTES
Rheumatology Clinic Visit  Augustine Fields M.D.     Carolina Mora MRN# [id number]   YOB: 1960 Age: 64 year old     Date of Visit: 01/09/2025    Primary care provider: Michael Beltrán     Assessment:    # Seropositive RA (RF 49/ACPA 205), dx 7/2015. + Hx rheumatoid nodules):  Apart from right knee instability symptoms, and right lateral epicondyle tenderness, patient relates continued near-complete freedom from joint pain, stiffness and swelling. Exam shows mild multiple DIP angulation/nodulosis, stable left elbow contracture and no inflammatory changes, no nodules.    Discussion: Rheumatoid arthritis remains in remission. Inflammatory symptoms have not been present for greater than 4 years, despite discontinuation of adalimumab in April 2023, and of methotrexate in August 2023.  I recommend checking inflammatory markers and xrays to ascertain alignment of lab and clinical findings.  We discussed high risk of bony damage over time, should inflammation recur and persist untreated, given the patient's prior seropositive serology profile.  I encourage patient to monitor carefully for symptoms such as joint pain, swelling, stiffness, and early morning stiffness and to alert rheumatology for recurrence of such symptoms.    # High risk medications. In May 2024, CRP was less than 3.0; hemoglobin A1c, TSH were all normal. High risk medications were discontinued as of August 2023, and no regular lab work is needed.    # Osteoarthritis, hands:  # Ulcerative colitis: No current symptoms.    We discussed:    Diagnosis:  1.  Rheumatoid arthritis: Symptoms and physical examination today suggest complete remission. I recommend checking CRP, ESR, and CBC  2.  Osteoarthritis, hands, early, mild, stable  3. Ulcerative colitis: no recurrence of bleeding or GI symptoms  4.  Lateral epicondylitis (tennis elbow), right  5.  Instability symptoms, right knee    Plan:  Remain off Humira and methotrexate; check blood work  for inflammation, hand x-rays.  Report to rheumatology if sustained symptoms joints or gut return for more than a week  Follow-up with orthopedics for knee instability  Heat, rest, ibuprofen 400 to 600 mg as needed for right elbow discomfort    Follow-up: 12 months or if inflammatory joint symptoms recur.    Augustine Fields MD  Staff Rheumatologist, [facility]    Orders Placed This Encounter   Procedures    XR Hand Bilateral 1 View    Basic metabolic panel    CRP inflammation    Erythrocyte sedimentation rate auto     On the day of the encounter, a total of 31 minutes was spent in chart review, and in counseling and coordination of care, regarding the patient's complex medical problem of seropositive rheumatoid arthritis, ulcerative colitis.    The longitudinal plan of care for the diagnosis(es)/condition(s) as documented were addressed during this visit. Due to the added complexity in care, I will continue to support Carolina in the subsequent management and with ongoing continuity of care.      HPI: Carolina Mora  is here for follow-up rheumatoid arthritis (RA) +RF 49 CCP >205.  She was last seen in 1-2024. Arthritis was judged in remission at that time.  Recommendation was to continue off methotrexate and Humira.     Review- +RF 49 CCP >205 -hep b/c 2015 12/2015 -MTB QG; XR hand 8-2016   Past: Pt initially referred to rheumatology 7/2015 for 6m history of polyarticular inflammatory arthritis and hand paresthesias. Found to have +RF/CCP and started on MTX and prednisone with significant improvement. Started on Humira 1/2016 given persistent inflammatory arthritis. Started to titrate down on MTX from 8 to 6 tabs spring 2017 with no flare. Tapered to 7.5 mg/wk in mid-2018. Humira frequency reduced in 2019 every 21 day 6-2019.  Trial of methotrexate/Humira discontinuation in 2023.    Interval history January 9, 2025    She reports concern about damage from arthritis long term.  In the last 6 mos, she has had increased  "R knee buckling, worse with walking. There is popping in the R medial knee with rotation to the L. She recalls being told that she was told about an injury to her ACL 5 years ago; she has had trouble kneeling since then.    She has noted mild soreness at a point on her R elbow, less prominent on the L. No exacerbation with use. Not bad enough to use medication    Otherwise, minimal joint pain. No early morning stiffness.   +neck and upper shoulder stiffness, discomfort, only at work with sitting and stress.  She notes mild gelling symptoms after sitting.  Occasional hand aching fleeting, once every few weeks.    Interval history January 11, 2024    Minimal joint pain. No early morning stiffness.   +neck and upper shoulder stiffness, discomfort, only at work with sitting and stress.  She notes mild gelling symptoms after sitting.  Occasional hand aching fleeting, once every few weeks.  Last injection of humira was in 5-23; stopped methotrexate in 8-2023. No change in msk symptoms since stopping.    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 Otherwise no joint swelling, stiffness. No ADL disruption. Sometimes in her feet, she notes \"need to get up and move\"  No early morning stiffness or morning foot pain.  She cites increased work stress recently; she still works 40 hour weeks.    GI symptoms have improved after Ulceris Rx; no further blood in the stool. Dr. Malone thought that no immune modulating medication needed for Rx of UC if Rx for RA was not needed.    Interval history October 6, 2022    UC has been rediagnosed (untreated for > 10 years) with blood in the stool via colonoscopy per Dr. Malone at [facility]. She started Uceris several weeks ago and bleeding has stopped. She had to modify diet to avoid stomach discomfort/bloating and even rebleeding.    No joint " pain/stiffness/swelling since last visit. She has no early morning stiffness. She walks regularly without restriction.    She has continued humira 40 mg every 4 weeks (down from q 3 weeks) and methotrexate weekly with no changes in her symptoms sicne last visit.    Interval history April 14, 2022    No joint pain, no stiffness, no humira dosing cycle sypmtoms. ADLs not disrupted.  No early morning stiffness.  No night pain.  Taking humira q 3 weeks and methotrexate 7.5 mg weekly.       PMH:  Injury-left 4th finger fracture now contracture PIP   Past Medical History:  Dysplasia of cervix (had cryotx) 1990's  Hypothyroidism (07/31/2014) due to atrophy  Menopause  Nephrolithiasis (s/p ESWL) (01/11)  Rheumatoid arthritis   Ulcerative colitis originally dx after 3rd child 2008. treated follow-up  Colonoscopies on follow up showed no scar tissue.     High cholesterol -diet   Smoker    Ventral hernia   DEXA 2017 T-0.7 normal      Past Surgical History:  Colonoscopy (03/14/14)  Cryothearpy, cervical (early 1990's)  HC removal Gallbladder (and repair ventral hernia)(2/01)     Family History:   No psoriasis, UC, crohn's, SLE, RA, PsA, gout, autoimmune thyroid.  No MS, heart disease or in family  Mother- Breast Cancer, Thyroid Disease  Father demential, lung mets brain, cancer, celiac passed   Maternal Grandmother- Diabetes, Hypertension, Cerebrovascular disease , Cancer, shingles   Paternal Grandmother- Hypertension, Breast Cancer  Sister- Thyroid hashmito's disease, Skin cancer basal   Son - asthma childhood, peanut allergies   No family history of Melanoma  Daughter has Crohn's disease       Social History:   smoked for 30 years - quit spring 2017  EtOH use - none  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   No IVDU. Right handed.      Current Outpatient Medications   Medication Sig Dispense Refill    albuterol (PROAIR HFA/PROVENTIL HFA/VENTOLIN HFA) 108 (90 Base) MCG/ACT inhaler Inhale 2 puffs into the  lungs every 6 hours as needed for shortness of breath, wheezing or cough. 18 g 1    folic acid (FOLVITE) 1 MG tablet Take 1 tablet (1 mg) by mouth daily 90 tablet 3    levothyroxine (SYNTHROID/LEVOTHROID) 100 MCG tablet Take 1 tablet (100 mcg) by mouth daily. 90 tablet 3    meclizine (ANTIVERT) 25 MG tablet Take 0.5 tablets (12.5 mg) by mouth 3 times daily as needed for dizziness. 90 tablet 1    methotrexate 2.5 MG tablet Take 3 tablets (7.5 mg) by mouth every 7 days 36 tablet 4     No current facility-administered medications for this visit.        ROS:  Negative raynaud s phenomena, hairloss, sun sensitivities, keratoconjunctivitis sicca, pleurisy, inflammatory joint symptoms, significant rashes like malar, oral/nasal or ulcerations, inflammatory eye disease, inflammatory bowel disease, dactylitis, tenosynovitis, or enthespathy. Negative for miscarriages over 2 months into pregnancy, blood clots, gout, psoriasis, UC, crohn's. No temporal headache, no jaw claudication, no scalp tenderness, vision changes, carotidynia, cough. No STD or pregnancy symptoms. No Parotid swelling. Denies international travel. Denies history of pneumonia, hepatitis, tuberculosis, shingles, sepsis, tick bites.      CONSTITUTIONAL: No fevers, night sweats or unintentional weight change. No acute distress, swollen glands  EYES: No vision change, diplopia, pain in eyes or red eyes   EARS, NOSE, MOUTH, THROAT: No tinnitus or hearing change, no epistaxis or nasal discharge, no oral lesions, throat clear. Normal saliva pool.  No drymouth. No thyroid enlargement.   CARDIOVASCULAR: No chest pain, palpitations, or pain with walking, no orthopnea or PND   RESPIRATORY: No dyspnea, cough, shortness of breath or wheezing. No pleurisy.   GI: No nausea, vomiting, diarrhea or constipation, no abdominal pain, or blood in stools.   : No change in urine, no dysuria or hematuria   MUSCKL: No swollen, tender, red or painful joints. No nodules. No enthesitis,  plantar fascitis or heel pain.   INTEGUMENTARY: No concerning lesions or moles   NEURO: No loss of strength or sensation, no numbness or tingling, no tremor, no dizziness, no headache. No falls   ENDO: No polyuria or polydipsia, no temperature intolerance   HEME/LYMPH:No concerning bumps, bleeding problems, or swollen lymph nodes.   ALLERGY: No environmental allergies   PSYCH:No depression or anxiety, no sleep problems.  Otherwise 14 point ROS obtained, reviewed and found negative.     Exam  Blood pressure 143/84, pulse 68, resp. rate 18, weight 71.7 kg (158 lb), SpO2 98%, not currently breastfeeding.  Wt Readings from Last 4 Encounters:   01/09/25 71.7 kg (158 lb)   12/12/24 73.9 kg (163 lb)   05/23/24 71.7 kg (158 lb)   01/11/24 71.9 kg (158 lb 8 oz)       Constitutional: well-developed, appearing stated age; cooperative  Eyes: nl EOM, PERRLA, vision, conjunctiva, sclera  ENT: nl external ears, nose, hearing, lips, teeth, gums, throat  No mucous membrane lesions, normal saliva pool  Neck: no mass or thyroid enlargement  Resp: breathing unlabored  Lymph: no cervical, supraclavicular, inguinal or epitrochlear nodes  MS:   mild joint laxity of BL wrists   - L hand 4th PIP slight contraction flexure 2/2 prior fracture.   - Laxity of PIP joints without synovitis.  - angulation, firm bony enlargement at > 3 DIPs  - L elbow w/ 5 deg contraction flexure w/o active synovitis (unchanged)  - Bony enlargement at first MTP at both sides  No synovitis    Skin: no nail pitting, alopecia, rash, nodules or lesions  Neuro: nl cranial nerves, strength, sensation, DTRs.   Psych: nl judgement, orientation, memory, affect.           Latest Ref Rng & Units 12/7/2023     3:45 PM 1/15/2024     7:52 AM 5/23/2024     3:56 PM   RHEUM RESULTS   Albumin 3.5 - 5.2 g/dL 4.2      ALT 0 - 50 U/L 12      AST 0 - 45 U/L 26      Creatinine 0.51 - 0.95 mg/dL 0.75      CRP Inflammation <5.00 mg/L  <3.00  <3.00    GFR Estimate >60 mL/min/1.73m2 90       Hematocrit 35.0 - 47.0 %  42.7     Hemoglobin 11.7 - 15.7 g/dL  14.1     WBC 4.0 - 11.0 10e3/uL  5.1     RBC Count 3.80 - 5.20 10e6/uL  4.66     RDW 10.0 - 15.0 %  12.3     MCHC 31.5 - 36.5 g/dL  33.0     MCV 78 - 100 fL  92     Platelet Count 150 - 450 10e3/uL  237     Sed Rate 0 - 30 mm/hr  9  8        Rheumatoid Factor   Date Value Ref Range Status   05/28/2015 49 (H) <20 IU/mL Final   ,  ,   Cyclic Cit Pept IgG/IgA   Date Value Ref Range Status   07/06/2015 205 (H) <20 UNITS Final     Comment:     Strongly Positive   ,  ,  ,  ,  ,  ,  ,  ,  ,  ,  ,  ,   Hepatitis B Core April   Date Value Ref Range Status   07/06/2015 Nonreactive NR Final   ,   Hep B Surface Agn   Date Value Ref Range Status   07/06/2015 Nonreactive NR Final   ,  ,  ,  ,  ,  ,  ,  ,  ,  ,  ,  ,  ,  ,  ,  ,  ,  ,  ,  ,  ,  ,   IGA   Date Value Ref Range Status   05/28/2015 222 70 - 380 mg/dL Final   ,  ,  ,  ,  ,  ,  ,

## 2025-01-09 ENCOUNTER — OFFICE VISIT (OUTPATIENT)
Dept: RHEUMATOLOGY | Facility: CLINIC | Age: 65
End: 2025-01-09
Payer: COMMERCIAL

## 2025-01-09 VITALS
BODY MASS INDEX: 27.55 KG/M2 | WEIGHT: 158 LBS | HEART RATE: 68 BPM | SYSTOLIC BLOOD PRESSURE: 143 MMHG | RESPIRATION RATE: 18 BRPM | DIASTOLIC BLOOD PRESSURE: 84 MMHG | OXYGEN SATURATION: 98 %

## 2025-01-09 DIAGNOSIS — M05.79 RHEUMATOID ARTHRITIS INVOLVING MULTIPLE SITES WITH POSITIVE RHEUMATOID FACTOR (H): Primary | ICD-10-CM

## 2025-01-09 ASSESSMENT — PAIN SCALES - GENERAL: PAINLEVEL_OUTOF10: NO PAIN (0)

## 2025-01-09 NOTE — NURSING NOTE
Chief Complaint   Patient presents with    RECHECK     Rheumatoid arthritis involving multiple sites with positive rheumatoid factor (H)       Vitals:    01/09/25 1157 01/09/25 1201   BP: 152/83 143/84   BP Location: Left arm    Patient Position: Sitting    Cuff Size: Adult Regular    Pulse: 68    Resp: 18    SpO2: 98%    Weight: 71.7 kg (158 lb)        Body mass index is 27.55 kg/m .

## 2025-01-09 NOTE — PATIENT INSTRUCTIONS
Diagnosis:  1.  Rheumatoid arthritis: Symptoms and physical examination today suggest complete remission. I recommend checking CRP, ESR, and CBC  2.  Osteoarthritis, hands, early, mild, stable  3. Ulcerative colitis: no recurrence of bleeding or GI symptoms  4.  Lateral epicondylitis (tennis elbow), right  5.  Instability symptoms, right knee    Plan:  Remain off Humira and methotrexate; check blood work for inflammation, hand x-rays.  Report to rheumatology if sustained symptoms joints or gut return for more than a week  Follow-up with orthopedics for knee instability  Heat, rest, ibuprofen 400 to 600 mg as needed for right elbow discomfort

## 2025-01-16 ENCOUNTER — TRANSFERRED RECORDS (OUTPATIENT)
Dept: HEALTH INFORMATION MANAGEMENT | Facility: CLINIC | Age: 65
End: 2025-01-16
Payer: COMMERCIAL

## 2025-01-24 ENCOUNTER — ANCILLARY PROCEDURE (OUTPATIENT)
Dept: GENERAL RADIOLOGY | Facility: CLINIC | Age: 65
End: 2025-01-24
Attending: INTERNAL MEDICINE
Payer: COMMERCIAL

## 2025-01-24 DIAGNOSIS — M05.79 RHEUMATOID ARTHRITIS INVOLVING MULTIPLE SITES WITH POSITIVE RHEUMATOID FACTOR (H): ICD-10-CM

## 2025-01-24 PROCEDURE — 73120 X-RAY EXAM OF HAND: CPT | Mod: TC | Performed by: INTERNAL MEDICINE

## 2025-03-27 ENCOUNTER — OFFICE VISIT (OUTPATIENT)
Dept: PODIATRY | Facility: CLINIC | Age: 65
End: 2025-03-27
Payer: COMMERCIAL

## 2025-03-27 VITALS — HEIGHT: 64 IN | BODY MASS INDEX: 26.46 KG/M2 | WEIGHT: 155 LBS

## 2025-03-27 DIAGNOSIS — M67.40 GANGLION CYST: ICD-10-CM

## 2025-03-27 DIAGNOSIS — M20.5X1 HALLUX LIMITUS OF RIGHT FOOT: Primary | ICD-10-CM

## 2025-03-27 DIAGNOSIS — M05.79 RHEUMATOID ARTHRITIS INVOLVING MULTIPLE SITES WITH POSITIVE RHEUMATOID FACTOR (H): ICD-10-CM

## 2025-03-27 NOTE — PROGRESS NOTES
S:  Patient complains of mass on the right foot.  Point to the dorsal medial right first MTPJ..  Has never had this before.  Patient has had this for 3 months. Has a burning pain with this.  Aggravated by activity and tight shoes and relieved by rest.  Denies mass on contralateral foot.  States she had bump on the dorsum of this joint before that has been present for years.  Was not really bothersome.  Patient has rheumatoid arthritis.  Has noted no ecchymosis blistering weakness or increased deformity.       ROS:     A 10-point review of systems was performed and is positive for that noted in the HPI and as seen below.  All other areas are negative.        Allergies   Allergen Reactions    Dye [Contrast Dye]     Morphine And Codeine      Nausea and vomiting       Current Outpatient Medications   Medication Sig Dispense Refill    albuterol (PROAIR HFA/PROVENTIL HFA/VENTOLIN HFA) 108 (90 Base) MCG/ACT inhaler Inhale 2 puffs into the lungs every 6 hours as needed for shortness of breath, wheezing or cough. 18 g 1    levothyroxine (SYNTHROID/LEVOTHROID) 100 MCG tablet Take 1 tablet (100 mcg) by mouth daily. 90 tablet 3    meclizine (ANTIVERT) 25 MG tablet Take 0.5 tablets (12.5 mg) by mouth 3 times daily as needed for dizziness. 90 tablet 1       Patient Active Problem List   Diagnosis    Ulcerative rectosigmoiditis (H)    History of nephrolithiasis    Hyperlipidemia with target LDL less than 130    Ventral hernia without obstruction or gangrene    Rheumatoid arthritis involving multiple sites with positive rheumatoid factor (H)    Encounter for long-term (current) use of high-risk medication    Hypothyroidism due to acquired atrophy of thyroid    Screening for cervical cancer    Immunocompromised    Weakness of both lower extremities       Past Medical History:   Diagnosis Date    Dysplasia of cervix 09/01/1990    had cryotx    Hyperlipidemia LDL goal < 130 07/31/2014    Hypothyroidism     Menopause age 48     "Nephrolithiasis 2011    s/p ESWL    Rheumatoid arthritis (H) 2015    Smoker     quit 2/15/20 -- prob 10-15 pack-year history or so (~1/4 - 1/2 ppd x 35-40 yrs)    Ulcerative colitis (H) 2006       Past Surgical History:   Procedure Laterality Date    COLONOSCOPY  2014    CRYOTHERAPY, CERVICAL  EARLY     EYE SURGERY      HC REMOVAL GALLBLADDER  2001    and repair ventral hernia    HERNIA REPAIR         Family History   Problem Relation Age of Onset    Breast Cancer Mother     Thyroid Disease Mother     Diabetes Maternal Grandmother     Hypertension Maternal Grandmother     Cerebrovascular Disease Maternal Grandmother     Cancer Maternal Grandmother     Hypertension Paternal Grandmother     Breast Cancer Paternal Grandmother     Cancer Paternal Grandmother     Thyroid Disease Sister     Skin Cancer Sister     Asthma Son     Thyroid Disease Brother     Melanoma No family hx of        Social History     Tobacco Use    Smoking status: Former     Current packs/day: 0.00     Types: Cigarettes     Quit date: 2/15/2020     Years since quittin.1     Passive exposure: Never    Smokeless tobacco: Never    Tobacco comments:     1 pack a week   Substance Use Topics    Alcohol use: Not Currently     Comment: occasional wine (twice per year)           EXAM:    Vitals: Ht 1.613 m (5' 3.5\")   Wt 70.3 kg (155 lb)   BMI 27.03 kg/m    BMI: Body mass index is 27.03 kg/m .  Height: 5' 3.5\"    Constitutional/ general:  Pt is in no apparent distress, appears well-nourished.  Cooperative with history and physical exam.     Psych:  The patient answered questions appropriately.  Normal affect.  Seems to have reasonable expectations, in terms of treatment.     Eyes:  Visual scanning/ tracking without deficit.     Ears:  Response to auditory stimuli is normal.  No hearing aid devices.  Auricles in proper alignment.     Lymphatic:  Popliteal lymph nodes not enlarged.     Lungs:  Non labored breathing, non " labored speech. No cough.  No audible wheezing. Even, quiet breathing.       Vascular:  Pedal pulses are palpable bilaterally for both the DP and PT arteries.  CFT < 3 sec.  No edema.  Pedal hair growth noted.    Neuro:  Alert and oriented x 3. Coordinated gait.  Light touch sensation is intact to the L4, L5, S1 distributions. No obvious deficits.  No evidence of neurological-based weakness, spasticity, or contracture in the lower extremities.     Derm: Normal texture and turgor.  No erythema, ecchymosis, or cyanosis.  No open lesions.       Musculoskeletal:    Lower extremity muscle strength is normal.  Patient is ambulatory without an assistive device or brace .  Patient has a normal arch.  Mild right bunion deformity noted.  Her dorsiflexion is only 25% normal.  There is significant bone spurring on the dorsum of the first metatarsal.  On the dorsal medial first MTPJ there is a cyst.  It is well encapsulated.  Mobile.  No pain with palpation.        Radiographic Exam:  X-Ray Findings:  I personally reviewed the films.  Consistent with above    A:  RA  Right HAV/HL  Ganglion cyst     P:  Discussed she has a mild bunion.  Explained that most of her bone prominence actually is from arthritis in this joint.  Discussed cause of hallux limitus.  Not really that bothersome for her now.  Discussed shoes to offload this.  Will avoid activities that bother this and discussed what will bother this.  Explained cause of ganglion cyst.  With prominent bone is here for pressure on here.  Again we will keep this offloaded.  Discussed other treatment options.  She would like this exsanguinated.  Risks complications, and efficacy of ganglion cyst exsanguination discussed.  Patient understands that if we exsanguinate this it could come back immediately.  After written consent we blocked the area with cold spray and prepped in a normal sterile maner.  18 gauge needle was inserted into cyst and all fluid was removed.  Gauze and mild  compression.  Will have mild compression on here for the next week.  Warned patient not to try to remove fluid and squeeze this herself as it could become infected.  If this drains at all return to clinic asap.   Return to clinic prn.        Isai Kay, CAROLE, FACFAS

## 2025-03-27 NOTE — PATIENT INSTRUCTIONS
We wish you continued good healing. If you have any questions or concerns, please do not hesitate to contact us at  [phone]    [facility] (secure e-mail communication and access to your chart) to send a message or to make an appointment.    Please remember to call and schedule a follow up appointment if one was recommended at your earliest convenience.     PODIATRY CLINIC HOURS  TELEPHONE NUMBER    Dr. Isai DENISEPMITCH FACFAS        Clinics:  [facility]   [place]  Tuesday 1PM-6PM  [place]  Wednesday 745AM-330PM  [place]  Monday 2nd,4th  830AM-4PM  Thursday/Friday 745AM-230PM     [place] APPOINTMENTS  [phone]    [place] APPOINTMENTS  [phone]          If you need a medication refill, please contact us you may need lab work and/or a follow up visit prior to your refill (i.e. Antifungal medications).  If MRI needed please call Imaging at [phone]   HOW DO I GET MY KNEE SCOOTER? Knee scooters can be picked up at ANY Medical Supply stores with your knee scooter Prescription.  OR  Bring your signed prescription to an [facility] showroom.   Set up an appointment for your custom Orthotics. Call any Orthotics locations call [phone]

## 2025-03-27 NOTE — LETTER
3/27/2025      Carolina Mora  [address]  [facility] [id number]      Dear Colleague,    Thank you for referring your patient, Carolina Mora, to the [facility]. Please see a copy of my visit note below.    S:  Patient complains of mass on the right foot.  Point to the dorsal medial right first MTPJ..  Has never had this before.  Patient has had this for 3 months. Has a burning pain with this.  Aggravated by activity and tight shoes and relieved by rest.  Denies mass on contralateral foot.  States she had bump on the dorsum of this joint before that has been present for years.  Was not really bothersome.  Patient has rheumatoid arthritis.  Has noted no ecchymosis blistering weakness or increased deformity.       ROS:     A 10-point review of systems was performed and is positive for that noted in the HPI and as seen below.  All other areas are negative.        Allergies   Allergen Reactions     Dye [Contrast Dye]      Morphine And Codeine      Nausea and vomiting       Current Outpatient Medications   Medication Sig Dispense Refill     albuterol (PROAIR HFA/PROVENTIL HFA/VENTOLIN HFA) 108 (90 Base) MCG/ACT inhaler Inhale 2 puffs into the lungs every 6 hours as needed for shortness of breath, wheezing or cough. 18 g 1     levothyroxine (SYNTHROID/LEVOTHROID) 100 MCG tablet Take 1 tablet (100 mcg) by mouth daily. 90 tablet 3     meclizine (ANTIVERT) 25 MG tablet Take 0.5 tablets (12.5 mg) by mouth 3 times daily as needed for dizziness. 90 tablet 1       Patient Active Problem List   Diagnosis     Ulcerative rectosigmoiditis (H)     History of nephrolithiasis     Hyperlipidemia with target LDL less than 130     Ventral hernia without obstruction or gangrene     Rheumatoid arthritis involving multiple sites with positive rheumatoid factor (H)     Encounter for long-term (current) use of high-risk medication     Hypothyroidism due to acquired atrophy of thyroid     Screening for cervical  "cancer     Immunocompromised     Weakness of both lower extremities       Past Medical History:   Diagnosis Date     Dysplasia of cervix 1990    had cryotx     Hyperlipidemia LDL goal < 130 2014     Hypothyroidism      Menopause age 48     Nephrolithiasis 2011    s/p ESWL     Rheumatoid arthritis (H) 2015     Smoker     quit 2/15/20 -- prob 10-15 pack-year history or so (~1/4 - 1/2 ppd x 35-40 yrs)     Ulcerative colitis (H) 2006       Past Surgical History:   Procedure Laterality Date     COLONOSCOPY  2014     CRYOTHERAPY, CERVICAL  EARLY      EYE SURGERY       HC REMOVAL GALLBLADDER  2001    and repair ventral hernia     HERNIA REPAIR         Family History   Problem Relation Age of Onset     Breast Cancer Mother      Thyroid Disease Mother      Diabetes Maternal Grandmother      Hypertension Maternal Grandmother      Cerebrovascular Disease Maternal Grandmother      Cancer Maternal Grandmother      Hypertension Paternal Grandmother      Breast Cancer Paternal Grandmother      Cancer Paternal Grandmother      Thyroid Disease Sister      Skin Cancer Sister      Asthma Son      Thyroid Disease Brother      Melanoma No family hx of        Social History     Tobacco Use     Smoking status: Former     Current packs/day: 0.00     Types: Cigarettes     Quit date: 2/15/2020     Years since quittin.1     Passive exposure: Never     Smokeless tobacco: Never     Tobacco comments:     1 pack a week   Substance Use Topics     Alcohol use: Not Currently     Comment: occasional wine (twice per year)           EXAM:    Vitals: Ht 1.613 m (5' 3.5\")   Wt 70.3 kg (155 lb)   BMI 27.03 kg/m    BMI: Body mass index is 27.03 kg/m .  Height: 5' 3.5\"    Constitutional/ general:  Pt is in no apparent distress, appears well-nourished.  Cooperative with history and physical exam.     Psych:  The patient answered questions appropriately.  Normal affect.  Seems to have reasonable " expectations, in terms of treatment.     Eyes:  Visual scanning/ tracking without deficit.     Ears:  Response to auditory stimuli is normal.  No hearing aid devices.  Auricles in proper alignment.     Lymphatic:  Popliteal lymph nodes not enlarged.     Lungs:  Non labored breathing, non labored speech. No cough.  No audible wheezing. Even, quiet breathing.       Vascular:  Pedal pulses are palpable bilaterally for both the DP and PT arteries.  CFT < 3 sec.  No edema.  Pedal hair growth noted.    Neuro:  Alert and oriented x 3. Coordinated gait.  Light touch sensation is intact to the L4, L5, S1 distributions. No obvious deficits.  No evidence of neurological-based weakness, spasticity, or contracture in the lower extremities.     Derm: Normal texture and turgor.  No erythema, ecchymosis, or cyanosis.  No open lesions.       Musculoskeletal:    Lower extremity muscle strength is normal.  Patient is ambulatory without an assistive device or brace .  Patient has a normal arch.  Mild right bunion deformity noted.  Her dorsiflexion is only 25% normal.  There is significant bone spurring on the dorsum of the first metatarsal.  On the dorsal medial first MTPJ there is a cyst.  It is well encapsulated.  Mobile.  No pain with palpation.        Radiographic Exam:  X-Ray Findings:  I personally reviewed the films.  Consistent with above    A:  RA  Right HAV/HL  Ganglion cyst     P:  Discussed she has a mild bunion.  Explained that most of her bone prominence actually is from arthritis in this joint.  Discussed cause of hallux limitus.  Not really that bothersome for her now.  Discussed shoes to offload this.  Will avoid activities that bother this and discussed what will bother this.  Explained cause of ganglion cyst.  With prominent bone is here for pressure on here.  Again we will keep this offloaded.  Discussed other treatment options.  She would like this exsanguinated.  Risks complications, and efficacy of ganglion cyst  exsanguination discussed.  Patient understands that if we exsanguinate this it could come back immediately.  After written consent we blocked the area with cold spray and prepped in a normal sterile maner.  18 gauge needle was inserted into cyst and all fluid was removed.  Gauze and mild compression.  Will have mild compression on here for the next week.  Warned patient not to try to remove fluid and squeeze this herself as it could become infected.  If this drains at all return to clinic asap.   Return to clinic prn.        Isai Kay DPM, FACFAS          Again, thank you for allowing me to participate in the care of your patient.        Sincerely,        Isai Kay DPM    Electronically signed

## 2025-03-30 ENCOUNTER — HEALTH MAINTENANCE LETTER (OUTPATIENT)
Age: 65
End: 2025-03-30

## 2025-06-26 DIAGNOSIS — E03.4 HYPOTHYROIDISM DUE TO ACQUIRED ATROPHY OF THYROID: ICD-10-CM

## 2025-06-26 RX ORDER — LEVOTHYROXINE SODIUM 100 MCG
100 TABLET ORAL DAILY
Qty: 15 TABLET | Refills: 0 | Status: SHIPPED | OUTPATIENT
Start: 2025-06-26